# Patient Record
Sex: FEMALE | Race: WHITE | NOT HISPANIC OR LATINO | Employment: FULL TIME | URBAN - METROPOLITAN AREA
[De-identification: names, ages, dates, MRNs, and addresses within clinical notes are randomized per-mention and may not be internally consistent; named-entity substitution may affect disease eponyms.]

---

## 2017-01-05 ENCOUNTER — HOSPITAL ENCOUNTER (OUTPATIENT)
Dept: MAMMOGRAPHY | Facility: HOSPITAL | Age: 57
Discharge: HOME | End: 2017-01-05
Attending: OBSTETRICS & GYNECOLOGY | Admitting: OBSTETRICS & GYNECOLOGY

## 2017-04-13 ENCOUNTER — HOSPITAL ENCOUNTER (OUTPATIENT)
Dept: ORTHOPEDIC SURGERY | Facility: CLINIC | Age: 57
Discharge: HOME | End: 2017-04-13
Attending: ORTHOPAEDIC SURGERY | Admitting: ORTHOPAEDIC SURGERY

## 2018-02-08 ENCOUNTER — HOSPITAL ENCOUNTER (OUTPATIENT)
Dept: MAMMOGRAPHY | Facility: HOSPITAL | Age: 58
Discharge: HOME | End: 2018-02-08
Attending: OBSTETRICS & GYNECOLOGY | Admitting: OBSTETRICS & GYNECOLOGY

## 2019-01-11 ENCOUNTER — HOSPITAL ENCOUNTER (OUTPATIENT)
Dept: FAMILY MEDICINE CLINIC | Facility: CLINIC | Age: 59
Setting detail: SPECIMEN
Discharge: HOME | End: 2019-01-11
Attending: FAMILY MEDICINE | Admitting: FAMILY MEDICINE

## 2019-01-11 LAB
ALBUMIN SERPL-MCNC: 4.2 G/DL (ref 3.5–4.8)
ALBUMIN/GLOB SERPL: 1.8 {RATIO} (ref 1–1.7)
ALP SERPL-CCNC: 72 IU/L (ref 32–91)
ALT SERPL-CCNC: 31 IU/L (ref 14–54)
ANION GAP SERPL CALC-SCNC: 13.4 MMOL/L (ref 10–20)
AST SERPL-CCNC: 31 IU/L (ref 15–41)
BASOPHILS # BLD AUTO: 0 10*3/UL (ref 0–0.2)
BASOPHILS NFR BLD AUTO: 1 % (ref 0–2)
BILIRUB SERPL-MCNC: 0.6 MG/DL (ref 0.3–1.2)
BUN SERPL-MCNC: 17 MG/DL (ref 8–20)
BUN/CREAT SERPL: 18.9 (ref 5.4–26.2)
CALCIUM SERPL-MCNC: 9.6 MG/DL (ref 8.9–10.3)
CHLORIDE SERPL-SCNC: 101 MMOL/L (ref 101–111)
CONV CO2: 27 MMOL/L (ref 22–32)
CONV TOTAL PROTEIN: 6.5 G/DL (ref 6.1–7.9)
CREAT UR-MCNC: 0.9 MG/DL (ref 0.4–1)
DIFFERENTIAL METHOD BLD: (no result)
EOSINOPHIL # BLD AUTO: 0.1 10*3/UL (ref 0–0.3)
EOSINOPHIL # BLD AUTO: 2 % (ref 0–3)
ERYTHROCYTE [DISTWIDTH] IN BLOOD BY AUTOMATED COUNT: 13.2 % (ref 11.5–14.5)
GLOBULIN UR ELPH-MCNC: 2.3 G/DL (ref 2.5–3.8)
GLUCOSE SERPL-MCNC: 94 MG/DL (ref 65–99)
HCT VFR BLD AUTO: 39.8 % (ref 35–49)
HGB BLD-MCNC: 13.4 G/DL (ref 12–15)
LYMPHOCYTES # BLD AUTO: 1.7 10*3/UL (ref 0.8–4.8)
LYMPHOCYTES NFR BLD AUTO: 30 % (ref 18–42)
MCH RBC QN AUTO: 30.9 PG (ref 26–32)
MCHC RBC AUTO-ENTMCNC: 33.7 G/DL (ref 32–36)
MCV RBC AUTO: 91.9 FL (ref 80–94)
MONOCYTES # BLD AUTO: 0.3 10*3/UL (ref 0.1–1.3)
MONOCYTES NFR BLD AUTO: 6 % (ref 2–11)
NEUTROPHILS # BLD AUTO: 3.4 10*3/UL (ref 2.3–8.6)
NEUTROPHILS NFR BLD AUTO: 61 % (ref 50–75)
NRBC BLD AUTO-RTO: 0 /100{WBCS}
NRBC/RBC NFR BLD MANUAL: 0 10*3/UL
PLATELET # BLD AUTO: 184 10*3/UL (ref 150–450)
PMV BLD AUTO: 10.1 FL (ref 7.4–10.4)
POTASSIUM SERPL-SCNC: 4.4 MMOL/L (ref 3.6–5.1)
RBC # BLD AUTO: 4.33 10*6/UL (ref 4–5.4)
SODIUM SERPL-SCNC: 137 MMOL/L (ref 136–144)
WBC # BLD AUTO: 5.6 10*3/UL (ref 4.5–11.5)

## 2019-01-30 ENCOUNTER — HOSPITAL ENCOUNTER (OUTPATIENT)
Dept: GASTROENTEROLOGY | Facility: HOSPITAL | Age: 59
Setting detail: HOSPITAL OUTPATIENT SURGERY
Discharge: HOME | End: 2019-01-30
Attending: INTERNAL MEDICINE | Admitting: INTERNAL MEDICINE

## 2019-01-30 ENCOUNTER — ON CAMPUS - OUTPATIENT (AMBULATORY)
Dept: URBAN - METROPOLITAN AREA HOSPITAL 85 | Facility: HOSPITAL | Age: 59
End: 2019-01-30
Payer: COMMERCIAL

## 2019-01-30 DIAGNOSIS — Z12.11 ENCOUNTER FOR SCREENING FOR MALIGNANT NEOPLASM OF COLON: ICD-10-CM

## 2019-01-30 PROCEDURE — 45378 DIAGNOSTIC COLONOSCOPY: CPT | Performed by: INTERNAL MEDICINE

## 2019-03-13 ENCOUNTER — HOSPITAL ENCOUNTER (OUTPATIENT)
Dept: MAMMOGRAPHY | Facility: HOSPITAL | Age: 59
Discharge: HOME | End: 2019-03-13
Attending: OBSTETRICS & GYNECOLOGY | Admitting: OBSTETRICS & GYNECOLOGY

## 2020-01-14 RX ORDER — CALCIUM CARBONATE 500(1250)
1 TABLET ORAL DAILY
COMMUNITY
Start: 2019-01-11

## 2020-01-14 RX ORDER — MULTIVITAMIN
1 CAPSULE ORAL DAILY
COMMUNITY
Start: 2017-04-13 | End: 2020-01-28

## 2020-01-14 RX ORDER — CYANOCOBALAMIN (VITAMIN B-12) 1000 MCG
1 TABLET ORAL DAILY
COMMUNITY
Start: 2019-01-11

## 2020-01-14 RX ORDER — MULTIVITAMIN WITH IRON
1 TABLET ORAL DAILY
COMMUNITY
Start: 2017-04-13

## 2020-01-14 RX ORDER — ESOMEPRAZOLE MAGNESIUM 20 MG/1
1 TABLET, DELAYED RELEASE ORAL DAILY
COMMUNITY
Start: 2017-04-13 | End: 2021-11-02

## 2020-01-28 ENCOUNTER — OFFICE VISIT (OUTPATIENT)
Dept: FAMILY MEDICINE CLINIC | Facility: CLINIC | Age: 60
End: 2020-01-28

## 2020-01-28 VITALS
TEMPERATURE: 97.3 F | DIASTOLIC BLOOD PRESSURE: 87 MMHG | OXYGEN SATURATION: 100 % | HEIGHT: 66 IN | SYSTOLIC BLOOD PRESSURE: 128 MMHG | WEIGHT: 142 LBS | BODY MASS INDEX: 22.82 KG/M2 | HEART RATE: 66 BPM

## 2020-01-28 DIAGNOSIS — Z23 NEED FOR VACCINATION: ICD-10-CM

## 2020-01-28 DIAGNOSIS — Z00.00 ENCOUNTER FOR WELL ADULT EXAM WITHOUT ABNORMAL FINDINGS: Primary | ICD-10-CM

## 2020-01-28 PROCEDURE — 90732 PPSV23 VACC 2 YRS+ SUBQ/IM: CPT | Performed by: FAMILY MEDICINE

## 2020-01-28 PROCEDURE — 90471 IMMUNIZATION ADMIN: CPT | Performed by: FAMILY MEDICINE

## 2020-01-28 PROCEDURE — 99396 PREV VISIT EST AGE 40-64: CPT | Performed by: FAMILY MEDICINE

## 2020-01-28 NOTE — PROGRESS NOTES
Subjective   Angella Santos is a 59 y.o. female.     History of Present Illness     The patient present  today for a physical.  The patient doing well denies fatigue, cold intolerance, headache, cough,chest pain, palpitations, dizziness and SOB. The patient admits to dietary compliance is  fair  and exercising occasionally.  She is a non-smoker.  She has screening labs done in October. 2019 brought copy to review.  She is up to date with Pap smear, mammogram and colonoscopy.    The following portions of the patient's history were reviewed and updated as appropriate: past medical history, past social history, past surgical history and problem list.    Review of Systems   Constitutional: Negative for fatigue and fever.   HENT: Negative for ear pain, sinus pressure, sore throat and trouble swallowing.    Eyes: Negative for blurred vision.   Respiratory: Negative for cough, shortness of breath and wheezing.    Cardiovascular: Negative for chest pain, palpitations and leg swelling.   Gastrointestinal: Negative for abdominal pain, diarrhea, nausea and vomiting.   Endocrine: Negative for cold intolerance, heat intolerance, polydipsia, polyphagia and polyuria.   Genitourinary: Negative for dysuria and frequency.   Musculoskeletal: Positive for arthralgias. Negative for back pain and gait problem.   Neurological: Negative for dizziness, tremors, weakness and headache.   Psychiatric/Behavioral: Negative for depressed mood. The patient is not nervous/anxious.        Objective   Physical Exam   Constitutional: She is oriented to person, place, and time. She appears well-developed.   HENT:   Right Ear: External ear normal.   Left Ear: External ear normal.   Mouth/Throat: Oropharynx is clear and moist.   Eyes: Pupils are equal, round, and reactive to light. Conjunctivae and EOM are normal.   Neck: Normal range of motion. Neck supple.   Cardiovascular: Normal rate, regular rhythm, normal heart sounds and intact distal pulses.    Pulmonary/Chest: Effort normal and breath sounds normal. She has no wheezes.   Abdominal: Soft. Bowel sounds are normal. There is no tenderness.   Musculoskeletal: Normal range of motion.   Neurological: She is alert and oriented to person, place, and time.   Psychiatric: She has a normal mood and affect.   Vitals reviewed.        Assessment/Plan   Problems Addressed this Visit        Other    Encounter for well adult exam without abnormal findings - Primary     Discussed preventive care protocol and  importance of regular exercise and recommend starting or continuing a regular exercise program for good health.  Lab result reviewed with patient increase LDL recommend low-fat diet and fish oil 1 g daily.  Discussed immunization she already had a flu shot and is not due for tetanus booster.   Prevnar given today.         Need for vaccination    Relevant Medications    pneumococcal conj. 13-valent (PREVNAR-13) vaccine 0.5 mL (Completed)

## 2020-01-28 NOTE — ASSESSMENT & PLAN NOTE
Discussed preventive care protocol and  importance of regular exercise and recommend starting or continuing a regular exercise program for good health.  Lab result reviewed with patient increase LDL recommend low-fat diet and fish oil 1 g daily.  Discussed immunization she already had a flu shot and is not due for tetanus booster.   Prevnar given today.

## 2020-05-12 ENCOUNTER — TRANSCRIBE ORDERS (OUTPATIENT)
Dept: ADMINISTRATIVE | Facility: HOSPITAL | Age: 60
End: 2020-05-12

## 2020-05-12 DIAGNOSIS — Z12.31 VISIT FOR SCREENING MAMMOGRAM: Primary | ICD-10-CM

## 2020-05-12 DIAGNOSIS — Z13.820 SPECIAL SCREENING FOR OSTEOPOROSIS: ICD-10-CM

## 2020-05-21 ENCOUNTER — HOSPITAL ENCOUNTER (OUTPATIENT)
Dept: BONE DENSITY | Facility: HOSPITAL | Age: 60
Discharge: HOME | End: 2020-05-21

## 2020-05-21 ENCOUNTER — HOSPITAL ENCOUNTER (OUTPATIENT)
Dept: MAMMOGRAPHY | Facility: HOSPITAL | Age: 60
Discharge: HOME | End: 2020-05-21
Admitting: OBSTETRICS & GYNECOLOGY

## 2020-05-21 DIAGNOSIS — Z12.31 VISIT FOR SCREENING MAMMOGRAM: ICD-10-CM

## 2020-05-21 DIAGNOSIS — Z13.820 SPECIAL SCREENING FOR OSTEOPOROSIS: ICD-10-CM

## 2020-05-21 PROCEDURE — 77063 BREAST TOMOSYNTHESIS BI: CPT

## 2020-05-21 PROCEDURE — 77067 SCR MAMMO BI INCL CAD: CPT

## 2020-05-21 PROCEDURE — 77080 DXA BONE DENSITY AXIAL: CPT

## 2020-05-22 ENCOUNTER — NURSE TRIAGE (OUTPATIENT)
Dept: CALL CENTER | Facility: HOSPITAL | Age: 60
End: 2020-05-22

## 2020-05-22 NOTE — TELEPHONE ENCOUNTER
"Caller has a new dx and wanting to know what medication her insurance, after speaking with the patient the caller is going to calll the insurance company    Reason for Disposition  • Caller has NON-URGENT medication question about med that PCP prescribed and triager unable to answer question    Additional Information  • Negative: Drug overdose and nurse unable to answer question  • Negative: Caller requesting information not related to medicine  • Negative: Caller requesting a prescription for Strep throat and has a positive culture result  • Negative: Rash while taking a medication or within 3 days of stopping it  • Negative: Immunization reaction suspected  • Negative: [1] Asthma and [2] having symptoms of asthma (cough, wheezing, etc)  • Negative: MORE THAN A DOUBLE DOSE of a prescription or over-the-counter (OTC) drug  • Negative: [1] DOUBLE DOSE (an extra dose or lesser amount) of over-the-counter (OTC) drug AND [2] any symptoms (e.g., dizziness, nausea, pain, sleepiness)  • Negative: [1] DOUBLE DOSE (an extra dose or lesser amount) of prescription drug AND [2] any symptoms (e.g., dizziness, nausea, pain, sleepiness)  • Negative: Took another person's prescription drug  • Negative: [1] DOUBLE DOSE (an extra dose or lesser amount) of prescription drug AND [2] NO symptoms (Exception: a double dose of antibiotics)  • Negative: Diabetes drug error or overdose (e.g., insulin or extra dose)  • Negative: [1] Request for URGENT new prescription or refill of \"essential\" medication (i.e., likelihood of harm to patient if not taken) AND [2] triager unable to fill per unit policy  • Negative: [1] Prescription not at pharmacy AND [2] was prescribed today by PCP  • Negative: Pharmacy calling with prescription questions and triager unable to answer question  • Negative: Caller has URGENT medication question about med that PCP prescribed and triager unable to answer question    Answer Assessment - Initial Assessment " "Questions  1. SYMPTOMS: \"Do you have any symptoms?\"      Na   2. SEVERITY: If symptoms are present, ask \"Are they mild, moderate or severe?\"    Moderate to severe    Protocols used: MEDICATION QUESTION CALL-ADULT-      "

## 2020-10-30 ENCOUNTER — LAB (OUTPATIENT)
Dept: LAB | Facility: HOSPITAL | Age: 60
End: 2020-10-30

## 2020-10-30 ENCOUNTER — TRANSCRIBE ORDERS (OUTPATIENT)
Dept: ADMINISTRATIVE | Facility: HOSPITAL | Age: 60
End: 2020-10-30

## 2020-10-30 DIAGNOSIS — Z78.0 MENOPAUSE: ICD-10-CM

## 2020-10-30 DIAGNOSIS — Z78.0 MENOPAUSE: Primary | ICD-10-CM

## 2020-10-30 LAB
ALBUMIN SERPL-MCNC: 4.7 G/DL (ref 3.5–5.2)
ALBUMIN/GLOB SERPL: 1.8 G/DL
ALP SERPL-CCNC: 73 U/L (ref 39–117)
ALT SERPL W P-5'-P-CCNC: 23 U/L (ref 1–33)
ANION GAP SERPL CALCULATED.3IONS-SCNC: 7.5 MMOL/L (ref 5–15)
AST SERPL-CCNC: 25 U/L (ref 1–32)
BASOPHILS # BLD AUTO: 0.02 10*3/MM3 (ref 0–0.2)
BASOPHILS NFR BLD AUTO: 0.4 % (ref 0–1.5)
BILIRUB SERPL-MCNC: 0.3 MG/DL (ref 0–1.2)
BUN SERPL-MCNC: 15 MG/DL (ref 8–23)
BUN/CREAT SERPL: 18.1 (ref 7–25)
CALCIUM SPEC-SCNC: 9.7 MG/DL (ref 8.6–10.5)
CHLORIDE SERPL-SCNC: 102 MMOL/L (ref 98–107)
CHOLEST SERPL-MCNC: 197 MG/DL (ref 0–200)
CO2 SERPL-SCNC: 29.5 MMOL/L (ref 22–29)
CREAT SERPL-MCNC: 0.83 MG/DL (ref 0.57–1)
DEPRECATED RDW RBC AUTO: 43.2 FL (ref 37–54)
EOSINOPHIL # BLD AUTO: 0.07 10*3/MM3 (ref 0–0.4)
EOSINOPHIL NFR BLD AUTO: 1.4 % (ref 0.3–6.2)
ERYTHROCYTE [DISTWIDTH] IN BLOOD BY AUTOMATED COUNT: 12.7 % (ref 12.3–15.4)
GFR SERPL CREATININE-BSD FRML MDRD: 70 ML/MIN/1.73
GLOBULIN UR ELPH-MCNC: 2.6 GM/DL
GLUCOSE SERPL-MCNC: 93 MG/DL (ref 65–99)
HCT VFR BLD AUTO: 41.3 % (ref 34–46.6)
HDLC SERPL-MCNC: 60 MG/DL (ref 40–60)
HGB BLD-MCNC: 13.8 G/DL (ref 12–15.9)
IMM GRANULOCYTES # BLD AUTO: 0.01 10*3/MM3 (ref 0–0.05)
IMM GRANULOCYTES NFR BLD AUTO: 0.2 % (ref 0–0.5)
LDLC SERPL CALC-MCNC: 123 MG/DL (ref 0–100)
LDLC/HDLC SERPL: 2.02 {RATIO}
LYMPHOCYTES # BLD AUTO: 1.25 10*3/MM3 (ref 0.7–3.1)
LYMPHOCYTES NFR BLD AUTO: 25.3 % (ref 19.6–45.3)
MCH RBC QN AUTO: 30.8 PG (ref 26.6–33)
MCHC RBC AUTO-ENTMCNC: 33.4 G/DL (ref 31.5–35.7)
MCV RBC AUTO: 92.2 FL (ref 79–97)
MONOCYTES # BLD AUTO: 0.29 10*3/MM3 (ref 0.1–0.9)
MONOCYTES NFR BLD AUTO: 5.9 % (ref 5–12)
NEUTROPHILS NFR BLD AUTO: 3.3 10*3/MM3 (ref 1.7–7)
NEUTROPHILS NFR BLD AUTO: 66.8 % (ref 42.7–76)
NRBC BLD AUTO-RTO: 0 /100 WBC (ref 0–0.2)
PLATELET # BLD AUTO: 171 10*3/MM3 (ref 140–450)
PMV BLD AUTO: 11.5 FL (ref 6–12)
POTASSIUM SERPL-SCNC: 4.4 MMOL/L (ref 3.5–5.2)
PROT SERPL-MCNC: 7.3 G/DL (ref 6–8.5)
RBC # BLD AUTO: 4.48 10*6/MM3 (ref 3.77–5.28)
SODIUM SERPL-SCNC: 139 MMOL/L (ref 136–145)
TRIGL SERPL-MCNC: 80 MG/DL (ref 0–150)
TSH SERPL DL<=0.05 MIU/L-ACNC: 1.73 UIU/ML (ref 0.27–4.2)
VLDLC SERPL-MCNC: 14 MG/DL (ref 5–40)
WBC # BLD AUTO: 4.94 10*3/MM3 (ref 3.4–10.8)

## 2020-10-30 PROCEDURE — 84443 ASSAY THYROID STIM HORMONE: CPT

## 2020-10-30 PROCEDURE — 80053 COMPREHEN METABOLIC PANEL: CPT

## 2020-10-30 PROCEDURE — 82652 VIT D 1 25-DIHYDROXY: CPT

## 2020-10-30 PROCEDURE — 80061 LIPID PANEL: CPT

## 2020-10-30 PROCEDURE — 36415 COLL VENOUS BLD VENIPUNCTURE: CPT

## 2020-10-30 PROCEDURE — 85025 COMPLETE CBC W/AUTO DIFF WBC: CPT

## 2020-11-02 LAB — 1,25(OH)2D3 SERPL-MCNC: 51.3 PG/ML (ref 19.9–79.3)

## 2021-03-18 ENCOUNTER — OFFICE VISIT (OUTPATIENT)
Dept: FAMILY MEDICINE CLINIC | Facility: CLINIC | Age: 61
End: 2021-03-18

## 2021-03-18 ENCOUNTER — TELEPHONE (OUTPATIENT)
Dept: FAMILY MEDICINE CLINIC | Facility: CLINIC | Age: 61
End: 2021-03-18

## 2021-03-18 DIAGNOSIS — N30.00 ACUTE CYSTITIS WITHOUT HEMATURIA: Primary | ICD-10-CM

## 2021-03-18 PROCEDURE — 99213 OFFICE O/P EST LOW 20 MIN: CPT | Performed by: FAMILY MEDICINE

## 2021-03-18 RX ORDER — CIPROFLOXACIN 500 MG/1
500 TABLET, FILM COATED ORAL 2 TIMES DAILY
Qty: 14 TABLET | Refills: 0 | Status: SHIPPED | OUTPATIENT
Start: 2021-03-18 | End: 2021-03-25

## 2021-03-18 NOTE — PROGRESS NOTES
Subjective   Angella Santos is a 60 y.o. female.     You have chosen to receive care through a telehealth visit.  Do you consent to use a video/audio connection for your medical care today? Yes.    Telehealth visit in view of COVID-19 pandemic.Patient presents with urinary frequency and urgency. This is a new problem. The current episode started in the past 5 days.  Pertinent negatives include fever, no discharge, flank pain, hematuria, nausea or vomiting.              The following portions of the patient's history were reviewed and updated as appropriate: past medical history, past social history, past surgical history and problem list.    Review of Systems   Constitutional: Negative for fever.   Genitourinary: Positive for difficulty urinating, dysuria, frequency and urgency. Negative for flank pain, pelvic pain, vaginal bleeding and vaginal discharge.   Musculoskeletal: Negative for back pain.       Objective   Physical Exam  Neurological:      Mental Status: She is alert and oriented to person, place, and time.       Current Outpatient Medications on File Prior to Visit   Medication Sig Dispense Refill   • Calcium 500 MG tablet Take 1 tablet by mouth Daily.     • Cyanocobalamin (B-12) 1000 MCG tablet Take 1 tablet by mouth Daily.     • Esomeprazole Magnesium (nexIUM 24HR) 20 MG tablet delayed-release Take 1 tablet by mouth Daily.     • Magnesium 250 MG tablet Take 1 tablet by mouth Daily.     • [DISCONTINUED] sulfamethoxazole-trimethoprim (BACTRIM DS,SEPTRA DS) 800-160 MG per tablet Take 1 tablet by mouth 2 (Two) Times a Day. 14 tablet 0     No current facility-administered medications on file prior to visit.           Assessment/Plan   Problems Addressed this Visit        Genitourinary and Reproductive     Acute cystitis without hematuria - Primary     Advised symptom management,  plenty of fluids and Rx Cipro.  Make appointment if symptoms no better.    This is a video visit [employer] was used to complete this  visits. Total time of discussion was 15 minutes.           Diagnoses       Codes Comments    Acute cystitis without hematuria    -  Primary ICD-10-CM: N30.00  ICD-9-CM: 595.0

## 2021-03-18 NOTE — TELEPHONE ENCOUNTER
Caller: Angella Santos    Relationship: Self    Best call back number: [phone]    What medication are you requesting: SOMETHING TO HELP GET RID OF UTI    What are your current symptoms:  URGENCY, PRESSURE, STRONG SMELLING URINE, BURNING    How long have you been experiencing symptoms: 4 DAYS      If a prescription is needed, what is your preferred pharmacy and phone number:      [id number] - [place] [address] AT [address] OF [place] [address] & [facility] [address]  - [phone]  - [phone] FX        PATIENT WAS EXPOSED TO COVID 12 DAYS AGO, CAN NOT COME IN FOR APPOINTMENT CURRENTLY. PLEASE ADVISE IF WE CAN SEND IN MEDICATION TO HELP WITH UTI SYMPTOMS.

## 2021-03-18 NOTE — ASSESSMENT & PLAN NOTE
Advised symptom management,  plenty of fluids and Rx Cipro.  Make appointment if symptoms no better.    This is a video visit [employer] was used to complete this visits. Total time of discussion was 15 minutes.

## 2021-04-28 ENCOUNTER — TRANSCRIBE ORDERS (OUTPATIENT)
Dept: ADMINISTRATIVE | Facility: HOSPITAL | Age: 61
End: 2021-04-28

## 2021-04-28 DIAGNOSIS — Z12.39 SCREENING BREAST EXAMINATION: Primary | ICD-10-CM

## 2021-04-28 DIAGNOSIS — Z78.0 POSTMENOPAUSAL: ICD-10-CM

## 2021-05-27 ENCOUNTER — HOSPITAL ENCOUNTER (OUTPATIENT)
Dept: MAMMOGRAPHY | Facility: HOSPITAL | Age: 61
Discharge: HOME | End: 2021-05-27

## 2021-05-27 ENCOUNTER — HOSPITAL ENCOUNTER (OUTPATIENT)
Dept: BONE DENSITY | Facility: HOSPITAL | Age: 61
Discharge: HOME | End: 2021-05-27

## 2021-05-27 DIAGNOSIS — Z12.39 SCREENING BREAST EXAMINATION: ICD-10-CM

## 2021-05-27 DIAGNOSIS — Z78.0 POSTMENOPAUSAL: ICD-10-CM

## 2021-05-27 PROCEDURE — 77063 BREAST TOMOSYNTHESIS BI: CPT

## 2021-05-27 PROCEDURE — 77080 DXA BONE DENSITY AXIAL: CPT

## 2021-05-27 PROCEDURE — 77067 SCR MAMMO BI INCL CAD: CPT

## 2021-11-02 ENCOUNTER — OFFICE VISIT (OUTPATIENT)
Dept: FAMILY MEDICINE CLINIC | Facility: CLINIC | Age: 61
End: 2021-11-02

## 2021-11-02 ENCOUNTER — LAB (OUTPATIENT)
Dept: FAMILY MEDICINE CLINIC | Facility: CLINIC | Age: 61
End: 2021-11-02

## 2021-11-02 VITALS
SYSTOLIC BLOOD PRESSURE: 140 MMHG | HEIGHT: 66 IN | BODY MASS INDEX: 23.88 KG/M2 | OXYGEN SATURATION: 99 % | HEART RATE: 72 BPM | DIASTOLIC BLOOD PRESSURE: 87 MMHG | TEMPERATURE: 96.9 F | WEIGHT: 148.6 LBS

## 2021-11-02 DIAGNOSIS — Z11.59 ENCOUNTER FOR HEPATITIS C SCREENING TEST FOR LOW RISK PATIENT: ICD-10-CM

## 2021-11-02 DIAGNOSIS — Z13.6 ENCOUNTER FOR LIPID SCREENING FOR CARDIOVASCULAR DISEASE: ICD-10-CM

## 2021-11-02 DIAGNOSIS — Z13.220 ENCOUNTER FOR LIPID SCREENING FOR CARDIOVASCULAR DISEASE: ICD-10-CM

## 2021-11-02 DIAGNOSIS — Z00.00 ENCOUNTER FOR WELL ADULT EXAM WITHOUT ABNORMAL FINDINGS: ICD-10-CM

## 2021-11-02 DIAGNOSIS — Z00.00 ENCOUNTER FOR WELL ADULT EXAM WITHOUT ABNORMAL FINDINGS: Primary | ICD-10-CM

## 2021-11-02 LAB
ALBUMIN SERPL-MCNC: 4.8 G/DL (ref 3.5–5.2)
ALBUMIN/GLOB SERPL: 1.8 G/DL
ALP SERPL-CCNC: 87 U/L (ref 39–117)
ALT SERPL W P-5'-P-CCNC: 18 U/L (ref 1–33)
ANION GAP SERPL CALCULATED.3IONS-SCNC: 8.3 MMOL/L (ref 5–15)
AST SERPL-CCNC: 25 U/L (ref 1–32)
BASOPHILS # BLD AUTO: 0.03 10*3/MM3 (ref 0–0.2)
BASOPHILS NFR BLD AUTO: 0.6 % (ref 0–1.5)
BILIRUB SERPL-MCNC: 0.3 MG/DL (ref 0–1.2)
BUN SERPL-MCNC: 13 MG/DL (ref 8–23)
BUN/CREAT SERPL: 13.3 (ref 7–25)
CALCIUM SPEC-SCNC: 10.1 MG/DL (ref 8.6–10.5)
CHLORIDE SERPL-SCNC: 104 MMOL/L (ref 98–107)
CHOLEST SERPL-MCNC: 206 MG/DL (ref 0–200)
CO2 SERPL-SCNC: 28.7 MMOL/L (ref 22–29)
CREAT SERPL-MCNC: 0.98 MG/DL (ref 0.57–1)
DEPRECATED RDW RBC AUTO: 41.4 FL (ref 37–54)
EOSINOPHIL # BLD AUTO: 0.08 10*3/MM3 (ref 0–0.4)
EOSINOPHIL NFR BLD AUTO: 1.6 % (ref 0.3–6.2)
ERYTHROCYTE [DISTWIDTH] IN BLOOD BY AUTOMATED COUNT: 12.6 % (ref 12.3–15.4)
GFR SERPL CREATININE-BSD FRML MDRD: 58 ML/MIN/1.73
GLOBULIN UR ELPH-MCNC: 2.7 GM/DL
GLUCOSE SERPL-MCNC: 98 MG/DL (ref 65–99)
HCT VFR BLD AUTO: 39.9 % (ref 34–46.6)
HDLC SERPL-MCNC: 59 MG/DL (ref 40–60)
HGB BLD-MCNC: 13.6 G/DL (ref 12–15.9)
IMM GRANULOCYTES # BLD AUTO: 0.01 10*3/MM3 (ref 0–0.05)
IMM GRANULOCYTES NFR BLD AUTO: 0.2 % (ref 0–0.5)
LDLC SERPL CALC-MCNC: 132 MG/DL (ref 0–100)
LDLC/HDLC SERPL: 2.21 {RATIO}
LYMPHOCYTES # BLD AUTO: 1.31 10*3/MM3 (ref 0.7–3.1)
LYMPHOCYTES NFR BLD AUTO: 26.4 % (ref 19.6–45.3)
MCH RBC QN AUTO: 30.8 PG (ref 26.6–33)
MCHC RBC AUTO-ENTMCNC: 34.1 G/DL (ref 31.5–35.7)
MCV RBC AUTO: 90.5 FL (ref 79–97)
MONOCYTES # BLD AUTO: 0.38 10*3/MM3 (ref 0.1–0.9)
MONOCYTES NFR BLD AUTO: 7.6 % (ref 5–12)
NEUTROPHILS NFR BLD AUTO: 3.16 10*3/MM3 (ref 1.7–7)
NEUTROPHILS NFR BLD AUTO: 63.6 % (ref 42.7–76)
NRBC BLD AUTO-RTO: 0 /100 WBC (ref 0–0.2)
PLATELET # BLD AUTO: 172 10*3/MM3 (ref 140–450)
PMV BLD AUTO: 12.1 FL (ref 6–12)
POTASSIUM SERPL-SCNC: 4.5 MMOL/L (ref 3.5–5.2)
PROT SERPL-MCNC: 7.5 G/DL (ref 6–8.5)
RBC # BLD AUTO: 4.41 10*6/MM3 (ref 3.77–5.28)
SODIUM SERPL-SCNC: 141 MMOL/L (ref 136–145)
TRIGL SERPL-MCNC: 84 MG/DL (ref 0–150)
TSH SERPL DL<=0.05 MIU/L-ACNC: 1.53 UIU/ML (ref 0.27–4.2)
VLDLC SERPL-MCNC: 15 MG/DL (ref 5–40)
WBC # BLD AUTO: 4.97 10*3/MM3 (ref 3.4–10.8)

## 2021-11-02 PROCEDURE — 84443 ASSAY THYROID STIM HORMONE: CPT | Performed by: FAMILY MEDICINE

## 2021-11-02 PROCEDURE — 99396 PREV VISIT EST AGE 40-64: CPT | Performed by: FAMILY MEDICINE

## 2021-11-02 PROCEDURE — 85025 COMPLETE CBC W/AUTO DIFF WBC: CPT | Performed by: FAMILY MEDICINE

## 2021-11-02 PROCEDURE — 80053 COMPREHEN METABOLIC PANEL: CPT | Performed by: FAMILY MEDICINE

## 2021-11-02 PROCEDURE — 36415 COLL VENOUS BLD VENIPUNCTURE: CPT

## 2021-11-02 PROCEDURE — 86803 HEPATITIS C AB TEST: CPT | Performed by: FAMILY MEDICINE

## 2021-11-02 PROCEDURE — 80061 LIPID PANEL: CPT | Performed by: FAMILY MEDICINE

## 2021-11-02 RX ORDER — ESOMEPRAZOLE MAGNESIUM 20 MG/1
FOR SUSPENSION ORAL
COMMUNITY
End: 2021-11-02

## 2021-11-02 RX ORDER — MULTIPLE VITAMINS W/ MINERALS TAB 9MG-400MCG
TAB ORAL
COMMUNITY
Start: 2020-11-30

## 2021-11-02 RX ORDER — PANTOPRAZOLE SODIUM 20 MG/1
20 TABLET, DELAYED RELEASE ORAL DAILY
Qty: 90 TABLET | Refills: 3 | Status: SHIPPED | OUTPATIENT
Start: 2021-11-02 | End: 2023-01-05 | Stop reason: SDUPTHER

## 2021-11-02 RX ORDER — CLOBETASOL PROPIONATE 0.5 MG/G
1 CREAM TOPICAL 2 TIMES DAILY
Qty: 30 G | Refills: 0 | Status: SHIPPED | OUTPATIENT
Start: 2021-11-02 | End: 2023-01-05

## 2021-11-02 RX ORDER — CHLORAL HYDRATE 500 MG
CAPSULE ORAL
COMMUNITY

## 2021-11-02 NOTE — PROGRESS NOTES
Subjective   Angella Santos is a 61 y.o. female.     History of Present Illness     The patient comes in today for annual physical. The patient is doing well denies anxiety, fatigue, cold intolerance, headache, cough, chest pain, palpitations, dizziness, abdominal pain, nausea and vomiting and SOB. The patient admits to dietary compliance is  fair  and exercising occasionally.  She is a non-smoker.   She also has Hx of GERD wants to switch from Nexium to Protonix due to being more effective.      The following portions of the patient's history were reviewed and updated as appropriate: past medical history, past social history, past surgical history and problem list.    Review of Systems   Constitutional: Negative for activity change, appetite change, fatigue and fever.   HENT: Negative for trouble swallowing.    Respiratory: Negative for cough, shortness of breath and wheezing.    Cardiovascular: Negative for chest pain and palpitations.   Gastrointestinal: Negative for abdominal pain, nausea, vomiting and indigestion.   Endocrine: Negative for cold intolerance.   Genitourinary: Negative for dysuria.   Musculoskeletal: Negative for back pain.   Neurological: Negative for headache.   Psychiatric/Behavioral: Negative for sleep disturbance and depressed mood. The patient is not nervous/anxious.        Objective   Physical Exam  Vitals reviewed.   Constitutional:       General: She is not in acute distress.     Appearance: She is well-developed.   Neck:      Thyroid: No thyromegaly.   Cardiovascular:      Rate and Rhythm: Normal rate.      Heart sounds: Normal heart sounds.   Pulmonary:      Effort: Pulmonary effort is normal.      Breath sounds: Normal breath sounds. No wheezing.   Abdominal:      General: Bowel sounds are normal.      Palpations: Abdomen is soft.      Tenderness: There is no abdominal tenderness.   Musculoskeletal:         General: Normal range of motion.      Cervical back: Normal range of motion and  neck supple.   Neurological:      Mental Status: She is alert and oriented to person, place, and time.   Psychiatric:         Mood and Affect: Mood normal.       Vitals:    11/02/21 0924   BP: 140/87   Pulse: 72   Temp: 96.9 °F (36.1 °C)   SpO2: 99%     Current Outpatient Medications on File Prior to Visit   Medication Sig Dispense Refill   • Calcium 500 MG tablet Take 1 tablet by mouth Daily.     • Cyanocobalamin (B-12) 1000 MCG tablet Take 1 tablet by mouth Daily.     • Magnesium 250 MG tablet Take 1 tablet by mouth Daily.     • multivitamin with minerals (MULTIVITAL-M PO)      • Omega-3 Fatty Acids (fish oil) 1000 MG capsule capsule      • [DISCONTINUED] esomeprazole (nexIUM) 20 MG packet      • [DISCONTINUED] Esomeprazole Magnesium (nexIUM 24HR) 20 MG tablet delayed-release Take 1 tablet by mouth Daily.     • [DISCONTINUED] estradiol (ESTRACE) 0.1 MG/GM vaginal cream Insert 1 gram of cream into vagina nightly at bedtime for 14 days. Then switch to twice weekly. 42.5 g 12     No current facility-administered medications on file prior to visit.           Assessment/Plan   Problems Addressed this Visit        Cardiac and Vasculature    Encounter for lipid screening for cardiovascular disease    Relevant Orders    Lipid panel (Completed)       Health Encounters    Encounter for well adult exam without abnormal findings - Primary     Discussed healthy diet and  importance of regular exercise and recommend starting or continuing a regular exercise program for good health.  Stressed importance of moderation in sodium/caffeine intake, saturated fat and cholesterol, caloric balance, sufficient intake of fresh fruits, vegetables, fiber, calcium and iron.           Relevant Orders    Lipid panel (Completed)    Comprehensive metabolic panel (Completed)    TSH (Completed)    CBC w AUTO Differential (Completed)       Infectious Diseases    Encounter for hepatitis C screening test for low risk patient    Relevant Orders     Hepatitis C antibody      Diagnoses       Codes Comments    Encounter for well adult exam without abnormal findings    -  Primary ICD-10-CM: Z00.00  ICD-9-CM: V70.0     Encounter for hepatitis C screening test for low risk patient     ICD-10-CM: Z11.59  ICD-9-CM: V73.89     Encounter for lipid screening for cardiovascular disease     ICD-10-CM: Z13.220, Z13.6  ICD-9-CM: V77.91, V81.2

## 2021-11-03 LAB — HCV AB SER DONR QL: NORMAL

## 2022-01-06 ENCOUNTER — TRANSCRIBE ORDERS (OUTPATIENT)
Dept: ADMINISTRATIVE | Facility: HOSPITAL | Age: 62
End: 2022-01-06

## 2022-01-06 DIAGNOSIS — Z13.6 ENCOUNTER FOR SCREENING FOR VASCULAR DISEASE: Primary | ICD-10-CM

## 2022-02-08 ENCOUNTER — HOSPITAL ENCOUNTER (OUTPATIENT)
Dept: CARDIOLOGY | Facility: HOSPITAL | Age: 62
Discharge: HOME | End: 2022-02-08

## 2022-02-08 ENCOUNTER — HOSPITAL ENCOUNTER (OUTPATIENT)
Dept: CT IMAGING | Facility: HOSPITAL | Age: 62
Discharge: HOME | End: 2022-02-08

## 2022-02-08 DIAGNOSIS — Z13.6 ENCOUNTER FOR SCREENING FOR VASCULAR DISEASE: ICD-10-CM

## 2022-02-08 LAB
BH CV XLRA MEAS - MID AO DIAM: 1.9 CM
BH CV XLRA MEAS - PAD LEFT ABI PT: 1.05
BH CV XLRA MEAS - PAD LEFT ARM: 127 MMHG
BH CV XLRA MEAS - PAD LEFT LEG PT: 137 MMHG
BH CV XLRA MEAS - PAD RIGHT ABI PT: 0.96
BH CV XLRA MEAS - PAD RIGHT ARM: 130 MMHG
BH CV XLRA MEAS - PAD RIGHT LEG PT: 125 MMHG
BH CV XLRA MEAS LEFT DIST CCA EDV: 21.1 CM/SEC
BH CV XLRA MEAS LEFT DIST CCA PSV: 63.2 CM/SEC
BH CV XLRA MEAS LEFT ICA/CCA RATIO: 1.1
BH CV XLRA MEAS LEFT MID CCA PSV: 63 CM/SEC
BH CV XLRA MEAS LEFT MID ICA PSV: 70 CM/SEC
BH CV XLRA MEAS LEFT PROX ICA EDV: -26.3 CM/SEC
BH CV XLRA MEAS LEFT PROX ICA PSV: -69.8 CM/SEC
BH CV XLRA MEAS RIGHT DIST CCA EDV: 31.2 CM/SEC
BH CV XLRA MEAS RIGHT DIST CCA PSV: 65 CM/SEC
BH CV XLRA MEAS RIGHT ICA/CCA RATIO: 1.1
BH CV XLRA MEAS RIGHT MID CCA PSV: 65 CM/SEC
BH CV XLRA MEAS RIGHT MID ICA PSV: 70 CM/SEC
BH CV XLRA MEAS RIGHT PROX ICA EDV: -27.7 CM/SEC
BH CV XLRA MEAS RIGHT PROX ICA PSV: -70.2 CM/SEC
MAXIMAL PREDICTED HEART RATE: 159 BPM
STRESS TARGET HR: 135 BPM

## 2022-02-08 PROCEDURE — 75571 CT HRT W/O DYE W/CA TEST: CPT

## 2022-02-08 PROCEDURE — 93799 UNLISTED CV SVC/PROCEDURE: CPT

## 2022-05-27 ENCOUNTER — TRANSCRIBE ORDERS (OUTPATIENT)
Dept: ADMINISTRATIVE | Facility: HOSPITAL | Age: 62
End: 2022-05-27

## 2022-05-27 DIAGNOSIS — Z12.31 VISIT FOR SCREENING MAMMOGRAM: Primary | ICD-10-CM

## 2022-06-14 ENCOUNTER — HOSPITAL ENCOUNTER (OUTPATIENT)
Dept: MAMMOGRAPHY | Facility: HOSPITAL | Age: 62
Discharge: HOME | End: 2022-06-14
Admitting: OBSTETRICS & GYNECOLOGY

## 2022-06-14 DIAGNOSIS — Z12.31 VISIT FOR SCREENING MAMMOGRAM: ICD-10-CM

## 2022-06-14 PROCEDURE — 77067 SCR MAMMO BI INCL CAD: CPT

## 2022-06-14 PROCEDURE — 77063 BREAST TOMOSYNTHESIS BI: CPT

## 2023-01-05 ENCOUNTER — OFFICE VISIT (OUTPATIENT)
Dept: FAMILY MEDICINE CLINIC | Facility: CLINIC | Age: 63
End: 2023-01-05
Payer: COMMERCIAL

## 2023-01-05 ENCOUNTER — LAB (OUTPATIENT)
Dept: FAMILY MEDICINE CLINIC | Facility: CLINIC | Age: 63
End: 2023-01-05
Payer: COMMERCIAL

## 2023-01-05 VITALS
HEART RATE: 63 BPM | DIASTOLIC BLOOD PRESSURE: 92 MMHG | TEMPERATURE: 96.4 F | OXYGEN SATURATION: 100 % | HEIGHT: 66 IN | WEIGHT: 142 LBS | SYSTOLIC BLOOD PRESSURE: 148 MMHG | RESPIRATION RATE: 16 BRPM | BODY MASS INDEX: 22.82 KG/M2

## 2023-01-05 DIAGNOSIS — Z00.00 ENCOUNTER FOR WELL ADULT EXAM WITHOUT ABNORMAL FINDINGS: Primary | ICD-10-CM

## 2023-01-05 LAB
ALBUMIN SERPL-MCNC: 4.6 G/DL (ref 3.5–5.2)
ALBUMIN/GLOB SERPL: 1.8 G/DL
ALP SERPL-CCNC: 79 U/L (ref 39–117)
ALT SERPL W P-5'-P-CCNC: 19 U/L (ref 1–33)
ANION GAP SERPL CALCULATED.3IONS-SCNC: 8.6 MMOL/L (ref 5–15)
AST SERPL-CCNC: 22 U/L (ref 1–32)
BASOPHILS # BLD AUTO: 0.03 10*3/MM3 (ref 0–0.2)
BASOPHILS NFR BLD AUTO: 0.5 % (ref 0–1.5)
BILIRUB SERPL-MCNC: 0.4 MG/DL (ref 0–1.2)
BUN SERPL-MCNC: 14 MG/DL (ref 8–23)
BUN/CREAT SERPL: 15.9 (ref 7–25)
CALCIUM SPEC-SCNC: 10 MG/DL (ref 8.6–10.5)
CHLORIDE SERPL-SCNC: 107 MMOL/L (ref 98–107)
CHOLEST SERPL-MCNC: 194 MG/DL (ref 0–200)
CO2 SERPL-SCNC: 28.4 MMOL/L (ref 22–29)
CREAT SERPL-MCNC: 0.88 MG/DL (ref 0.57–1)
DEPRECATED RDW RBC AUTO: 42.5 FL (ref 37–54)
EGFRCR SERPLBLD CKD-EPI 2021: 74.4 ML/MIN/1.73
EOSINOPHIL # BLD AUTO: 0.09 10*3/MM3 (ref 0–0.4)
EOSINOPHIL NFR BLD AUTO: 1.5 % (ref 0.3–6.2)
ERYTHROCYTE [DISTWIDTH] IN BLOOD BY AUTOMATED COUNT: 12.6 % (ref 12.3–15.4)
GLOBULIN UR ELPH-MCNC: 2.5 GM/DL
GLUCOSE SERPL-MCNC: 89 MG/DL (ref 65–99)
HCT VFR BLD AUTO: 38.7 % (ref 34–46.6)
HDLC SERPL-MCNC: 50 MG/DL (ref 40–60)
HGB BLD-MCNC: 13.2 G/DL (ref 12–15.9)
IMM GRANULOCYTES # BLD AUTO: 0.02 10*3/MM3 (ref 0–0.05)
IMM GRANULOCYTES NFR BLD AUTO: 0.3 % (ref 0–0.5)
LDLC SERPL CALC-MCNC: 124 MG/DL (ref 0–100)
LDLC/HDLC SERPL: 2.44 {RATIO}
LYMPHOCYTES # BLD AUTO: 1.5 10*3/MM3 (ref 0.7–3.1)
LYMPHOCYTES NFR BLD AUTO: 25.5 % (ref 19.6–45.3)
MCH RBC QN AUTO: 31.4 PG (ref 26.6–33)
MCHC RBC AUTO-ENTMCNC: 34.1 G/DL (ref 31.5–35.7)
MCV RBC AUTO: 92.1 FL (ref 79–97)
MONOCYTES # BLD AUTO: 0.43 10*3/MM3 (ref 0.1–0.9)
MONOCYTES NFR BLD AUTO: 7.3 % (ref 5–12)
NEUTROPHILS NFR BLD AUTO: 3.81 10*3/MM3 (ref 1.7–7)
NEUTROPHILS NFR BLD AUTO: 64.9 % (ref 42.7–76)
NRBC BLD AUTO-RTO: 0 /100 WBC (ref 0–0.2)
PLATELET # BLD AUTO: 162 10*3/MM3 (ref 140–450)
PMV BLD AUTO: 12.1 FL (ref 6–12)
POTASSIUM SERPL-SCNC: 4.3 MMOL/L (ref 3.5–5.2)
PROT SERPL-MCNC: 7.1 G/DL (ref 6–8.5)
RBC # BLD AUTO: 4.2 10*6/MM3 (ref 3.77–5.28)
SODIUM SERPL-SCNC: 144 MMOL/L (ref 136–145)
TRIGL SERPL-MCNC: 110 MG/DL (ref 0–150)
TSH SERPL DL<=0.05 MIU/L-ACNC: 0.97 UIU/ML (ref 0.27–4.2)
VLDLC SERPL-MCNC: 20 MG/DL (ref 5–40)
WBC NRBC COR # BLD: 5.88 10*3/MM3 (ref 3.4–10.8)

## 2023-01-05 PROCEDURE — 80050 GENERAL HEALTH PANEL: CPT | Performed by: FAMILY MEDICINE

## 2023-01-05 PROCEDURE — 80061 LIPID PANEL: CPT | Performed by: FAMILY MEDICINE

## 2023-01-05 PROCEDURE — 99396 PREV VISIT EST AGE 40-64: CPT | Performed by: FAMILY MEDICINE

## 2023-01-05 PROCEDURE — 36415 COLL VENOUS BLD VENIPUNCTURE: CPT | Performed by: FAMILY MEDICINE

## 2023-01-05 RX ORDER — PSYLLIUM SEED (WITH DEXTROSE)
POWDER (GRAM) ORAL
COMMUNITY
Start: 2022-07-30

## 2023-01-05 RX ORDER — PANTOPRAZOLE SODIUM 20 MG/1
20 TABLET, DELAYED RELEASE ORAL DAILY
Qty: 90 TABLET | Refills: 3 | Status: SHIPPED | OUTPATIENT
Start: 2023-01-05

## 2023-01-05 NOTE — ASSESSMENT & PLAN NOTE
Discussed healthy diet and  importance of regular.  Stressed importance of moderation in sodium/caffeine intake, saturated fat and cholesterol, caloric balance, sufficient intake of fresh fruits, vegetables, fiber, calcium and iron.  Patient is up-to-date with mammogram, Pap smear and colonoscopy.  We will check fasting lipid panel, CBC, TSH and CMP.

## 2023-01-05 NOTE — PROGRESS NOTES
Subjective   Angella Santos is a 62 y.o. female.     History of Present Illness     The patient comes in today for annual physical.  The patient is doing well denies fatigue, anxiety, depression, abdominal, headache, cough,chest pain, palpitations, dizziness and SOB.  The blood pressure is elevated today patient is asymptomatic.  The patient admits to dietary compliance is  fair  and exercising occasionally.  She is a non-smoker.      The following portions of the patient's history were reviewed and updated as appropriate: past medical history, past social history, past surgical history and problem list.    Review of Systems   Constitutional: Negative for activity change, appetite change, fatigue and fever.   HENT: Negative for sore throat and trouble swallowing.    Eyes: Negative for blurred vision.   Respiratory: Negative for cough, shortness of breath and wheezing.    Cardiovascular: Negative for chest pain and palpitations.   Gastrointestinal: Positive for indigestion. Negative for abdominal pain, nausea and vomiting.   Endocrine: Negative for cold intolerance.   Neurological: Negative for dizziness and headache.   Psychiatric/Behavioral: Negative for sleep disturbance and depressed mood. The patient is not nervous/anxious.        Objective   Physical Exam  Vitals reviewed.   Constitutional:       General: She is not in acute distress.     Appearance: She is well-developed.   Neck:      Thyroid: No thyromegaly.   Pulmonary:      Breath sounds: Normal breath sounds. No wheezing.   Abdominal:      Tenderness: There is no abdominal tenderness.   Musculoskeletal:         General: Normal range of motion.      Cervical back: Normal range of motion and neck supple. No tenderness.   Neurological:      Mental Status: She is alert and oriented to person, place, and time.   Psychiatric:         Mood and Affect: Mood normal.       Vitals:    01/05/23 1257   BP: 148/92   Pulse: 63   Resp: 16   Temp: 96.4 °F (35.8 °C)   SpO2:  100%   Body mass index is 22.92 kg/m².    Current Outpatient Medications on File Prior to Visit   Medication Sig Dispense Refill   • Calcium 500 MG tablet Take 1 tablet by mouth Daily.     • Cyanocobalamin (B-12) 1000 MCG tablet Take 1 tablet by mouth Daily.     • Magnesium 250 MG tablet Take 1 tablet by mouth Daily.     • multivitamin with minerals tablet tablet      • Omega-3 Fatty Acids (fish oil) 1000 MG capsule capsule      • Psyllium (Metamucil) 28.3 % powder      • [DISCONTINUED] pantoprazole (Protonix) 20 MG EC tablet Take 1 tablet by mouth Daily. 90 tablet 3   • [DISCONTINUED] clobetasol (TEMOVATE) 0.05 % cream Apply 1 application topically to the appropriate area as directed 2 (Two) Times a Day. 30 g 0     No current facility-administered medications on file prior to visit.           Assessment & Plan   Problems Addressed this Visit        Health Encounters    Encounter for well adult exam without abnormal findings - Primary     Discussed healthy diet and  importance of regular.  Stressed importance of moderation in sodium/caffeine intake, saturated fat and cholesterol, caloric balance, sufficient intake of fresh fruits, vegetables, fiber, calcium and iron.  Patient is up-to-date with mammogram, Pap smear and colonoscopy.  We will check fasting lipid panel, CBC, TSH and CMP.         Relevant Orders    Comprehensive metabolic panel    TSH    Lipid panel    CBC w AUTO Differential   Diagnoses       Codes Comments    Encounter for well adult exam without abnormal findings    -  Primary ICD-10-CM: Z00.00  ICD-9-CM: V70.0

## 2023-01-06 NOTE — PROGRESS NOTES
Patient notified via voicemail. Advised to call back if she has any questions or concerns on 1/6 @ 5:30PM.

## 2023-01-30 ENCOUNTER — TELEPHONE (OUTPATIENT)
Dept: FAMILY MEDICINE CLINIC | Facility: CLINIC | Age: 63
End: 2023-01-30

## 2023-01-30 NOTE — TELEPHONE ENCOUNTER
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 PATIENT HAS HEARD REALLY GOOD THINGS ABOUT NICHOLE ROBERTS.    Additional notes:

## 2023-08-01 ENCOUNTER — TRANSCRIBE ORDERS (OUTPATIENT)
Dept: ADMINISTRATIVE | Facility: HOSPITAL | Age: 63
End: 2023-08-01
Payer: COMMERCIAL

## 2023-08-01 DIAGNOSIS — Z12.31 VISIT FOR SCREENING MAMMOGRAM: ICD-10-CM

## 2023-08-01 DIAGNOSIS — M85.80 OSTEOPENIA, SENILE: Primary | ICD-10-CM

## 2023-08-11 ENCOUNTER — HOSPITAL ENCOUNTER (OUTPATIENT)
Dept: BONE DENSITY | Facility: HOSPITAL | Age: 63
Discharge: HOME | End: 2023-08-11
Payer: COMMERCIAL

## 2023-08-11 ENCOUNTER — HOSPITAL ENCOUNTER (OUTPATIENT)
Dept: MAMMOGRAPHY | Facility: HOSPITAL | Age: 63
Discharge: HOME | End: 2023-08-11
Payer: COMMERCIAL

## 2023-08-11 DIAGNOSIS — M85.80 OSTEOPENIA, SENILE: ICD-10-CM

## 2023-08-11 DIAGNOSIS — Z12.31 VISIT FOR SCREENING MAMMOGRAM: ICD-10-CM

## 2023-08-11 PROCEDURE — 77063 BREAST TOMOSYNTHESIS BI: CPT

## 2023-08-11 PROCEDURE — 77067 SCR MAMMO BI INCL CAD: CPT

## 2023-08-11 PROCEDURE — 77080 DXA BONE DENSITY AXIAL: CPT

## 2023-09-22 ENCOUNTER — TRANSCRIBE ORDERS (OUTPATIENT)
Dept: LAB | Facility: HOSPITAL | Age: 63
End: 2023-09-22
Payer: COMMERCIAL

## 2023-09-22 ENCOUNTER — LAB (OUTPATIENT)
Dept: LAB | Facility: HOSPITAL | Age: 63
End: 2023-09-22
Payer: COMMERCIAL

## 2023-09-22 DIAGNOSIS — M85.80 OSTEOPENIA, SENILE: Primary | ICD-10-CM

## 2023-09-22 DIAGNOSIS — M85.80 OSTEOPENIA, SENILE: ICD-10-CM

## 2023-09-22 LAB
CALCIUM SPEC-SCNC: 9.3 MG/DL (ref 8.6–10.5)
CREAT SERPL-MCNC: 0.94 MG/DL (ref 0.57–1)
EGFRCR SERPLBLD CKD-EPI 2021: 68.3 ML/MIN/1.73

## 2023-09-22 PROCEDURE — 82310 ASSAY OF CALCIUM: CPT

## 2023-09-22 PROCEDURE — 36415 COLL VENOUS BLD VENIPUNCTURE: CPT

## 2023-09-22 PROCEDURE — 82565 ASSAY OF CREATININE: CPT

## 2023-09-26 ENCOUNTER — TRANSCRIBE ORDERS (OUTPATIENT)
Dept: ADMINISTRATIVE | Facility: HOSPITAL | Age: 63
End: 2023-09-26
Payer: COMMERCIAL

## 2023-09-26 DIAGNOSIS — M81.0 SENILE OSTEOPOROSIS: Primary | ICD-10-CM

## 2023-11-01 ENCOUNTER — TRANSCRIBE ORDERS (OUTPATIENT)
Dept: ADMINISTRATIVE | Facility: HOSPITAL | Age: 63
End: 2023-11-01
Payer: COMMERCIAL

## 2023-11-01 ENCOUNTER — LAB (OUTPATIENT)
Dept: LAB | Facility: HOSPITAL | Age: 63
End: 2023-11-01
Payer: COMMERCIAL

## 2023-11-01 DIAGNOSIS — M85.80 OSTEOPENIA, UNSPECIFIED LOCATION: ICD-10-CM

## 2023-11-01 DIAGNOSIS — M85.80 OSTEOPENIA, UNSPECIFIED LOCATION: Primary | ICD-10-CM

## 2023-11-01 LAB
CALCIUM SPEC-SCNC: 9.8 MG/DL (ref 8.6–10.5)
CREAT SERPL-MCNC: 0.9 MG/DL (ref 0.57–1)
EGFRCR SERPLBLD CKD-EPI 2021: 72 ML/MIN/1.73

## 2023-11-01 PROCEDURE — 82565 ASSAY OF CREATININE: CPT

## 2023-11-01 PROCEDURE — 82310 ASSAY OF CALCIUM: CPT

## 2023-11-01 PROCEDURE — 36415 COLL VENOUS BLD VENIPUNCTURE: CPT

## 2023-11-02 PROBLEM — M81.0 OSTEOPOROSIS: Status: ACTIVE | Noted: 2023-11-02

## 2023-11-02 RX ORDER — ZOLEDRONIC ACID 5 MG/100ML
5 INJECTION, SOLUTION INTRAVENOUS ONCE
OUTPATIENT
Start: 2023-11-08

## 2023-11-02 RX ORDER — SODIUM CHLORIDE 9 MG/ML
250 INJECTION, SOLUTION INTRAVENOUS ONCE
OUTPATIENT
Start: 2023-11-08

## 2023-11-08 ENCOUNTER — HOSPITAL ENCOUNTER (OUTPATIENT)
Dept: ONCOLOGY | Facility: HOSPITAL | Age: 63
Discharge: HOME | End: 2023-11-08
Admitting: OBSTETRICS & GYNECOLOGY
Payer: COMMERCIAL

## 2023-11-08 VITALS
SYSTOLIC BLOOD PRESSURE: 129 MMHG | WEIGHT: 148.5 LBS | HEIGHT: 66 IN | RESPIRATION RATE: 16 BRPM | BODY MASS INDEX: 23.87 KG/M2 | OXYGEN SATURATION: 97 % | HEART RATE: 71 BPM | DIASTOLIC BLOOD PRESSURE: 78 MMHG | TEMPERATURE: 97.7 F

## 2023-11-08 DIAGNOSIS — M81.8 OTHER OSTEOPOROSIS, UNSPECIFIED PATHOLOGICAL FRACTURE PRESENCE: ICD-10-CM

## 2023-11-08 DIAGNOSIS — M81.0 OSTEOPOROSIS, UNSPECIFIED OSTEOPOROSIS TYPE, UNSPECIFIED PATHOLOGICAL FRACTURE PRESENCE: Primary | ICD-10-CM

## 2023-11-08 LAB
ALBUMIN SERPL-MCNC: 4.7 G/DL (ref 3.5–5.2)
ALBUMIN/GLOB SERPL: 1.6 G/DL
ALP SERPL-CCNC: 99 U/L (ref 39–117)
ALT SERPL W P-5'-P-CCNC: 20 U/L (ref 1–33)
ANION GAP SERPL CALCULATED.3IONS-SCNC: 9 MMOL/L (ref 5–15)
AST SERPL-CCNC: 26 U/L (ref 1–32)
BASOPHILS # BLD AUTO: 0.01 10*3/MM3 (ref 0–0.2)
BASOPHILS NFR BLD AUTO: 0.1 % (ref 0–1.5)
BILIRUB SERPL-MCNC: 0.3 MG/DL (ref 0–1.2)
BUN SERPL-MCNC: 17 MG/DL (ref 8–23)
BUN/CREAT SERPL: 17.7 (ref 7–25)
CALCIUM SPEC-SCNC: 10.4 MG/DL (ref 8.6–10.5)
CHLORIDE SERPL-SCNC: 104 MMOL/L (ref 98–107)
CO2 SERPL-SCNC: 27 MMOL/L (ref 22–29)
CREAT SERPL-MCNC: 0.96 MG/DL (ref 0.57–1)
DEPRECATED RDW RBC AUTO: 44.7 FL (ref 37–54)
EGFRCR SERPLBLD CKD-EPI 2021: 66.6 ML/MIN/1.73
EOSINOPHIL # BLD AUTO: 0.08 10*3/MM3 (ref 0–0.4)
EOSINOPHIL NFR BLD AUTO: 1.2 % (ref 0.3–6.2)
ERYTHROCYTE [DISTWIDTH] IN BLOOD BY AUTOMATED COUNT: 13.1 % (ref 12.3–15.4)
GLOBULIN UR ELPH-MCNC: 2.9 GM/DL
GLUCOSE SERPL-MCNC: 98 MG/DL (ref 65–99)
HCT VFR BLD AUTO: 42.4 % (ref 34–46.6)
HGB BLD-MCNC: 14 G/DL (ref 12–15.9)
LYMPHOCYTES # BLD AUTO: 1.23 10*3/MM3 (ref 0.7–3.1)
LYMPHOCYTES NFR BLD AUTO: 17.8 % (ref 19.6–45.3)
MAGNESIUM SERPL-MCNC: 2.1 MG/DL (ref 1.6–2.4)
MCH RBC QN AUTO: 31.5 PG (ref 26.6–33)
MCHC RBC AUTO-ENTMCNC: 33 G/DL (ref 31.5–35.7)
MCV RBC AUTO: 95.5 FL (ref 79–97)
MONOCYTES # BLD AUTO: 0.48 10*3/MM3 (ref 0.1–0.9)
MONOCYTES NFR BLD AUTO: 6.9 % (ref 5–12)
NEUTROPHILS NFR BLD AUTO: 5.12 10*3/MM3 (ref 1.7–7)
NEUTROPHILS NFR BLD AUTO: 74 % (ref 42.7–76)
PHOSPHATE SERPL-MCNC: 2.3 MG/DL (ref 2.5–4.5)
PLATELET # BLD AUTO: 193 10*3/MM3 (ref 140–450)
PMV BLD AUTO: 11.6 FL (ref 6–12)
POTASSIUM SERPL-SCNC: 4 MMOL/L (ref 3.5–5.2)
PROT SERPL-MCNC: 7.6 G/DL (ref 6–8.5)
RBC # BLD AUTO: 4.44 10*6/MM3 (ref 3.77–5.28)
SODIUM SERPL-SCNC: 140 MMOL/L (ref 136–145)
WBC NRBC COR # BLD: 6.92 10*3/MM3 (ref 3.4–10.8)

## 2023-11-08 PROCEDURE — 25810000003 SODIUM CHLORIDE 0.9 % SOLUTION: Performed by: OBSTETRICS & GYNECOLOGY

## 2023-11-08 PROCEDURE — 80053 COMPREHEN METABOLIC PANEL: CPT | Performed by: OBSTETRICS & GYNECOLOGY

## 2023-11-08 PROCEDURE — 96374 THER/PROPH/DIAG INJ IV PUSH: CPT

## 2023-11-08 PROCEDURE — 83735 ASSAY OF MAGNESIUM: CPT | Performed by: OBSTETRICS & GYNECOLOGY

## 2023-11-08 PROCEDURE — 25010000002 ZOLEDRONIC ACID 5 MG/100ML SOLUTION: Performed by: OBSTETRICS & GYNECOLOGY

## 2023-11-08 PROCEDURE — 96365 THER/PROPH/DIAG IV INF INIT: CPT

## 2023-11-08 PROCEDURE — 84100 ASSAY OF PHOSPHORUS: CPT | Performed by: OBSTETRICS & GYNECOLOGY

## 2023-11-08 PROCEDURE — 85025 COMPLETE CBC W/AUTO DIFF WBC: CPT | Performed by: OBSTETRICS & GYNECOLOGY

## 2023-11-08 RX ORDER — ZOLEDRONIC ACID 5 MG/100ML
5 INJECTION, SOLUTION INTRAVENOUS ONCE
Status: COMPLETED | OUTPATIENT
Start: 2023-11-08 | End: 2023-11-08

## 2023-11-08 RX ORDER — SODIUM CHLORIDE 9 MG/ML
250 INJECTION, SOLUTION INTRAVENOUS ONCE
Status: COMPLETED | OUTPATIENT
Start: 2023-11-08 | End: 2023-11-08

## 2023-11-08 RX ORDER — THIAMINE HCL 100 MG
1 TABLET ORAL DAILY
COMMUNITY

## 2023-11-08 RX ORDER — SODIUM CHLORIDE 9 MG/ML
250 INJECTION, SOLUTION INTRAVENOUS ONCE
Status: CANCELLED | OUTPATIENT
Start: 2023-11-08

## 2023-11-08 RX ORDER — ZOLEDRONIC ACID 5 MG/100ML
5 INJECTION, SOLUTION INTRAVENOUS ONCE
Status: CANCELLED | OUTPATIENT
Start: 2023-11-08

## 2023-11-08 RX ADMIN — SODIUM CHLORIDE 250 ML: 9 INJECTION, SOLUTION INTRAVENOUS at 13:45

## 2023-11-08 RX ADMIN — ZOLEDRONIC ACID 5 MG: 0.05 INJECTION, SOLUTION INTRAVENOUS at 13:47

## 2023-11-08 NOTE — PROGRESS NOTES
Patient is here for her first reclast. PIV started with positive blood return noted and labs drawn per protocol. Questions answered and patient verbalized understanding for treatment. Patient verbalized understanding to call her doctor if she has any issues after treatment today and to to the ER for any emergencies. Patient stated she is taking a calcium and vit d supplement and she just saw her dentist who did some work on her gums and not her teeth. Patient stated the dentist said that she was fine to get the reclast today. Patient wanted to have the reclast ran over 20 minutes instead of 15 minutes. Patient tolerated treatment and was discharged with AVS after the 30 min observation period.

## 2023-11-10 DIAGNOSIS — R79.89 LOW SERUM PHOSPHATE: Primary | ICD-10-CM

## 2023-11-10 DIAGNOSIS — M81.0 OSTEOPOROSIS, UNSPECIFIED OSTEOPOROSIS TYPE, UNSPECIFIED PATHOLOGICAL FRACTURE PRESENCE: ICD-10-CM

## 2023-11-21 ENCOUNTER — TELEPHONE (OUTPATIENT)
Dept: FAMILY MEDICINE CLINIC | Facility: CLINIC | Age: 63
End: 2023-11-21
Payer: COMMERCIAL

## 2023-11-22 ENCOUNTER — LAB (OUTPATIENT)
Dept: FAMILY MEDICINE CLINIC | Facility: CLINIC | Age: 63
End: 2023-11-22
Payer: COMMERCIAL

## 2023-11-22 DIAGNOSIS — M81.0 OSTEOPOROSIS, UNSPECIFIED OSTEOPOROSIS TYPE, UNSPECIFIED PATHOLOGICAL FRACTURE PRESENCE: ICD-10-CM

## 2023-11-22 DIAGNOSIS — R79.89 LOW SERUM PHOSPHATE: ICD-10-CM

## 2023-11-22 LAB — PHOSPHATE SERPL-MCNC: 2.9 MG/DL (ref 2.5–4.5)

## 2023-11-22 PROCEDURE — 84100 ASSAY OF PHOSPHORUS: CPT | Performed by: FAMILY MEDICINE

## 2023-11-22 PROCEDURE — 36415 COLL VENOUS BLD VENIPUNCTURE: CPT

## 2024-01-08 ENCOUNTER — LAB (OUTPATIENT)
Dept: FAMILY MEDICINE CLINIC | Facility: CLINIC | Age: 64
End: 2024-01-08
Payer: COMMERCIAL

## 2024-01-08 ENCOUNTER — OFFICE VISIT (OUTPATIENT)
Dept: FAMILY MEDICINE CLINIC | Facility: CLINIC | Age: 64
End: 2024-01-08
Payer: COMMERCIAL

## 2024-01-08 VITALS
SYSTOLIC BLOOD PRESSURE: 128 MMHG | RESPIRATION RATE: 16 BRPM | OXYGEN SATURATION: 99 % | DIASTOLIC BLOOD PRESSURE: 83 MMHG | BODY MASS INDEX: 23.3 KG/M2 | TEMPERATURE: 97.2 F | HEART RATE: 74 BPM | WEIGHT: 145 LBS | HEIGHT: 66 IN

## 2024-01-08 DIAGNOSIS — Z00.00 ENCOUNTER FOR WELL ADULT EXAM WITHOUT ABNORMAL FINDINGS: Primary | ICD-10-CM

## 2024-01-08 DIAGNOSIS — M81.0 OSTEOPOROSIS, UNSPECIFIED OSTEOPOROSIS TYPE, UNSPECIFIED PATHOLOGICAL FRACTURE PRESENCE: ICD-10-CM

## 2024-01-08 PROCEDURE — 36415 COLL VENOUS BLD VENIPUNCTURE: CPT | Performed by: FAMILY MEDICINE

## 2024-01-08 PROCEDURE — 80061 LIPID PANEL: CPT | Performed by: FAMILY MEDICINE

## 2024-01-08 PROCEDURE — 99396 PREV VISIT EST AGE 40-64: CPT | Performed by: FAMILY MEDICINE

## 2024-01-08 PROCEDURE — 82306 VITAMIN D 25 HYDROXY: CPT | Performed by: FAMILY MEDICINE

## 2024-01-08 RX ORDER — ZOLEDRONIC ACID 5 MG/100ML
5 INJECTION, SOLUTION INTRAVENOUS
COMMUNITY
Start: 2023-11-08

## 2024-01-08 RX ORDER — PANTOPRAZOLE SODIUM 20 MG/1
20 TABLET, DELAYED RELEASE ORAL DAILY
Qty: 90 TABLET | Refills: 3 | Status: SHIPPED | OUTPATIENT
Start: 2024-01-08

## 2024-01-08 NOTE — PROGRESS NOTES
Subjective   Angella Santos is a 63 y.o. female.     History of Present Illness     The patient comes in today for annual physical.  The patient is doing well denies anxiety, depressed mood, abdominal pain, nausea, dysuria, fatigue, cold intolerance, headache, cough, chest pain, palpitations, dizziness and SOB. The patient admits to dietary compliance is  fair  and exercising regularly.  She is a non-smoker.       The following portions of the patient's history were reviewed and updated as appropriate: past medical history, past social history, past surgical history and problem list.    Review of Systems   Constitutional:  Negative for activity change, appetite change, fatigue and fever.   HENT:  Negative for sore throat.    Eyes:  Negative for blurred vision.   Respiratory:  Negative for cough and shortness of breath.    Cardiovascular:  Negative for chest pain and palpitations.   Gastrointestinal:  Negative for abdominal pain and indigestion.   Endocrine: Negative for cold intolerance and polyphagia.   Neurological:  Negative for headache.   Psychiatric/Behavioral:  Negative for sleep disturbance and depressed mood. The patient is not nervous/anxious.        Objective   Physical Exam  Vitals reviewed.   Constitutional:       Appearance: She is well-developed.   Neck:      Thyroid: No thyromegaly.   Cardiovascular:      Heart sounds: Normal heart sounds.   Pulmonary:      Effort: Pulmonary effort is normal.      Breath sounds: Normal breath sounds. No wheezing.   Abdominal:      Tenderness: There is no abdominal tenderness.   Musculoskeletal:      Cervical back: Neck supple.   Neurological:      Mental Status: She is alert and oriented to person, place, and time.   Psychiatric:         Mood and Affect: Mood normal.         Vitals:    01/08/24 1245   BP: 128/83   Pulse: 74   Resp: 16   Temp: 97.2 °F (36.2 °C)   SpO2: 99%     Current Outpatient Medications on File Prior to Visit   Medication Sig Dispense Refill     Calcium Citrate-Vitamin D3 (CITRACAL) 315-6.25 MG-MCG tablet tablet Take 1 tablet by mouth Daily. Patient stated she is taking calcium with vit d not just calcium      clobetasol (TEMOVATE) 0.05 % cream 1 (one) gram as needed, Apply to external vulva 1-2 times weekly as needed. 15 g 2    Cyanocobalamin (B-12) 1000 MCG tablet Take 1 tablet by mouth Daily.      estradiol (ESTRACE) 0.1 MG/GM vaginal cream 1 (one) Gram to vagina as directed, Use fingertip amount nightly x 2 wks then 1-2 times weekly. 42.5 g 2    Magnesium 250 MG tablet Take 1 tablet by mouth Daily.      multivitamin with minerals tablet tablet       Omega-3 Fatty Acids (fish oil) 1000 MG capsule capsule       zoledronic acid (Reclast) 5 MG/100ML solution infusion 100 mL.       No current facility-administered medications on file prior to visit.         Assessment & Plan   Problems Addressed this Visit       Encounter for well adult exam without abnormal findings - Primary     Discussed healthy diet and  importance of regular exercise. Stressed importance of moderation in sodium/caffeine intake,  cholesterol, caloric balance, sufficient intake of fresh fruits and vegetables.           Relevant Orders    Lipid panel (Completed)    Osteoporosis    Relevant Orders    Vitamin D 25 hydroxy (Completed)     Diagnoses         Codes Comments    Encounter for well adult exam without abnormal findings    -  Primary ICD-10-CM: Z00.00  ICD-9-CM: V70.0     Osteoporosis, unspecified osteoporosis type, unspecified pathological fracture presence     ICD-10-CM: M81.0  ICD-9-CM: 733.00

## 2024-01-09 LAB
25(OH)D3 SERPL-MCNC: 57.3 NG/ML (ref 30–100)
CHOLEST SERPL-MCNC: 203 MG/DL (ref 0–200)
HDLC SERPL-MCNC: 47 MG/DL (ref 40–60)
LDLC SERPL CALC-MCNC: 133 MG/DL (ref 0–100)
LDLC/HDLC SERPL: 2.77 {RATIO}
TRIGL SERPL-MCNC: 130 MG/DL (ref 0–150)
VLDLC SERPL-MCNC: 23 MG/DL (ref 5–40)

## 2024-01-15 RX ORDER — ATORVASTATIN CALCIUM 10 MG/1
10 TABLET, FILM COATED ORAL DAILY
Qty: 90 TABLET | Refills: 3 | Status: SHIPPED | OUTPATIENT
Start: 2024-01-15 | End: 2024-01-16 | Stop reason: SDUPTHER

## 2024-01-16 RX ORDER — ATORVASTATIN CALCIUM 10 MG/1
10 TABLET, FILM COATED ORAL DAILY
Qty: 90 TABLET | Refills: 3 | Status: SHIPPED | OUTPATIENT
Start: 2024-01-16

## 2024-10-07 ENCOUNTER — TRANSCRIBE ORDERS (OUTPATIENT)
Dept: ADMINISTRATIVE | Facility: HOSPITAL | Age: 64
End: 2024-10-07
Payer: COMMERCIAL

## 2024-10-07 DIAGNOSIS — M81.0 AGE RELATED OSTEOPOROSIS, UNSPECIFIED PATHOLOGICAL FRACTURE PRESENCE: Primary | ICD-10-CM

## 2024-10-07 DIAGNOSIS — Z12.31 VISIT FOR SCREENING MAMMOGRAM: ICD-10-CM

## 2024-10-21 ENCOUNTER — TRANSCRIBE ORDERS (OUTPATIENT)
Dept: ADMINISTRATIVE | Facility: HOSPITAL | Age: 64
End: 2024-10-21
Payer: COMMERCIAL

## 2024-10-21 ENCOUNTER — HOSPITAL ENCOUNTER (OUTPATIENT)
Dept: MAMMOGRAPHY | Facility: HOSPITAL | Age: 64
Discharge: HOME | End: 2024-10-21
Payer: COMMERCIAL

## 2024-10-21 ENCOUNTER — LAB (OUTPATIENT)
Dept: LAB | Facility: HOSPITAL | Age: 64
End: 2024-10-21
Payer: COMMERCIAL

## 2024-10-21 ENCOUNTER — HOSPITAL ENCOUNTER (OUTPATIENT)
Dept: BONE DENSITY | Facility: HOSPITAL | Age: 64
Discharge: HOME | End: 2024-10-21
Payer: COMMERCIAL

## 2024-10-21 DIAGNOSIS — M81.0 SENILE OSTEOPOROSIS: ICD-10-CM

## 2024-10-21 DIAGNOSIS — Z12.31 VISIT FOR SCREENING MAMMOGRAM: ICD-10-CM

## 2024-10-21 DIAGNOSIS — M81.0 AGE RELATED OSTEOPOROSIS, UNSPECIFIED PATHOLOGICAL FRACTURE PRESENCE: ICD-10-CM

## 2024-10-21 DIAGNOSIS — M81.0 SENILE OSTEOPOROSIS: Primary | ICD-10-CM

## 2024-10-21 LAB
CALCIUM SPEC-SCNC: 9.8 MG/DL (ref 8.6–10.5)
CREAT SERPL-MCNC: 0.92 MG/DL (ref 0.57–1)
EGFRCR SERPLBLD CKD-EPI 2021: 69.7 ML/MIN/1.73

## 2024-10-21 PROCEDURE — 77080 DXA BONE DENSITY AXIAL: CPT

## 2024-10-21 PROCEDURE — 77067 SCR MAMMO BI INCL CAD: CPT

## 2024-10-21 PROCEDURE — 82565 ASSAY OF CREATININE: CPT

## 2024-10-21 PROCEDURE — 82310 ASSAY OF CALCIUM: CPT

## 2024-10-21 PROCEDURE — 36415 COLL VENOUS BLD VENIPUNCTURE: CPT

## 2024-10-21 PROCEDURE — 77063 BREAST TOMOSYNTHESIS BI: CPT

## 2024-10-29 ENCOUNTER — TRANSCRIBE ORDERS (OUTPATIENT)
Dept: ADMINISTRATIVE | Facility: HOSPITAL | Age: 64
End: 2024-10-29
Payer: COMMERCIAL

## 2024-10-29 DIAGNOSIS — M81.0 OSTEOPOROSIS, POST-MENOPAUSAL: Primary | ICD-10-CM

## 2024-11-05 RX ORDER — SODIUM CHLORIDE 9 MG/ML
20 INJECTION, SOLUTION INTRAVENOUS ONCE
Status: CANCELLED | OUTPATIENT
Start: 2024-11-11

## 2024-11-05 RX ORDER — ZOLEDRONIC ACID 0.05 MG/ML
5 INJECTION, SOLUTION INTRAVENOUS ONCE
Status: CANCELLED | OUTPATIENT
Start: 2024-11-11

## 2024-11-11 ENCOUNTER — HOSPITAL ENCOUNTER (OUTPATIENT)
Dept: ONCOLOGY | Facility: HOSPITAL | Age: 64
Discharge: HOME | End: 2024-11-11
Admitting: OBSTETRICS & GYNECOLOGY
Payer: COMMERCIAL

## 2024-11-11 VITALS
BODY MASS INDEX: 23.29 KG/M2 | HEIGHT: 66 IN | SYSTOLIC BLOOD PRESSURE: 130 MMHG | DIASTOLIC BLOOD PRESSURE: 78 MMHG | TEMPERATURE: 97.7 F | HEART RATE: 65 BPM | RESPIRATION RATE: 14 BRPM | WEIGHT: 144.9 LBS | OXYGEN SATURATION: 100 %

## 2024-11-11 DIAGNOSIS — M81.8 OTHER OSTEOPOROSIS, UNSPECIFIED PATHOLOGICAL FRACTURE PRESENCE: Primary | ICD-10-CM

## 2024-11-11 LAB
MAGNESIUM SERPL-MCNC: 2.1 MG/DL (ref 1.6–2.4)
PHOSPHATE SERPL-MCNC: 2.9 MG/DL (ref 2.5–4.5)

## 2024-11-11 PROCEDURE — 25010000002 ZOLEDRONIC ACID 5 MG/100ML SOLUTION: Performed by: OBSTETRICS & GYNECOLOGY

## 2024-11-11 PROCEDURE — 96365 THER/PROPH/DIAG IV INF INIT: CPT

## 2024-11-11 PROCEDURE — 96374 THER/PROPH/DIAG INJ IV PUSH: CPT

## 2024-11-11 PROCEDURE — 83735 ASSAY OF MAGNESIUM: CPT | Performed by: OBSTETRICS & GYNECOLOGY

## 2024-11-11 PROCEDURE — 84100 ASSAY OF PHOSPHORUS: CPT | Performed by: OBSTETRICS & GYNECOLOGY

## 2024-11-11 RX ORDER — SODIUM CHLORIDE 9 MG/ML
20 INJECTION, SOLUTION INTRAVENOUS ONCE
Status: CANCELLED | OUTPATIENT
Start: 2024-11-11

## 2024-11-11 RX ORDER — ZOLEDRONIC ACID 0.05 MG/ML
5 INJECTION, SOLUTION INTRAVENOUS ONCE
Status: CANCELLED | OUTPATIENT
Start: 2024-11-11

## 2024-11-11 RX ORDER — ZOLEDRONIC ACID 0.05 MG/ML
5 INJECTION, SOLUTION INTRAVENOUS ONCE
Status: COMPLETED | OUTPATIENT
Start: 2024-11-11 | End: 2024-11-11

## 2024-11-11 RX ORDER — SODIUM CHLORIDE 9 MG/ML
20 INJECTION, SOLUTION INTRAVENOUS ONCE
Status: DISCONTINUED | OUTPATIENT
Start: 2024-11-11 | End: 2024-11-12 | Stop reason: HOSPADM

## 2024-11-11 RX ADMIN — ZOLEDRONIC ACID 5 MG: 0.05 INJECTION, SOLUTION INTRAVENOUS at 13:44

## 2024-12-09 ENCOUNTER — TELEPHONE (OUTPATIENT)
Dept: FAMILY MEDICINE CLINIC | Facility: CLINIC | Age: 64
End: 2024-12-09
Payer: COMMERCIAL

## 2024-12-09 DIAGNOSIS — Z00.00 ENCOUNTER FOR WELL ADULT EXAM WITHOUT ABNORMAL FINDINGS: Primary | ICD-10-CM

## 2025-01-08 ENCOUNTER — LAB (OUTPATIENT)
Dept: FAMILY MEDICINE CLINIC | Facility: CLINIC | Age: 65
End: 2025-01-08
Payer: COMMERCIAL

## 2025-01-08 DIAGNOSIS — Z00.00 ENCOUNTER FOR WELL ADULT EXAM WITHOUT ABNORMAL FINDINGS: ICD-10-CM

## 2025-01-08 LAB
ALBUMIN SERPL-MCNC: 4.2 G/DL (ref 3.5–5.2)
ALBUMIN/GLOB SERPL: 1.4 G/DL
ALP SERPL-CCNC: 72 U/L (ref 39–117)
ALT SERPL W P-5'-P-CCNC: 16 U/L (ref 1–33)
ANION GAP SERPL CALCULATED.3IONS-SCNC: 8.9 MMOL/L (ref 5–15)
AST SERPL-CCNC: 24 U/L (ref 1–32)
BILIRUB SERPL-MCNC: 0.5 MG/DL (ref 0–1.2)
BUN SERPL-MCNC: 20 MG/DL (ref 8–23)
BUN/CREAT SERPL: 20 (ref 7–25)
CALCIUM SPEC-SCNC: 9.6 MG/DL (ref 8.6–10.5)
CHLORIDE SERPL-SCNC: 102 MMOL/L (ref 98–107)
CHOLEST SERPL-MCNC: 208 MG/DL (ref 0–200)
CO2 SERPL-SCNC: 27.1 MMOL/L (ref 22–29)
CREAT SERPL-MCNC: 1 MG/DL (ref 0.57–1)
DEPRECATED RDW RBC AUTO: 41 FL (ref 37–54)
EGFRCR SERPLBLD CKD-EPI 2021: 63 ML/MIN/1.73
ERYTHROCYTE [DISTWIDTH] IN BLOOD BY AUTOMATED COUNT: 12 % (ref 12.3–15.4)
GLOBULIN UR ELPH-MCNC: 2.9 GM/DL
GLUCOSE SERPL-MCNC: 108 MG/DL (ref 65–99)
HCT VFR BLD AUTO: 38.8 % (ref 34–46.6)
HDLC SERPL-MCNC: 59 MG/DL (ref 40–60)
HGB BLD-MCNC: 13.3 G/DL (ref 12–15.9)
LDLC SERPL CALC-MCNC: 132 MG/DL (ref 0–100)
LDLC/HDLC SERPL: 2.19 {RATIO}
MCH RBC QN AUTO: 31.8 PG (ref 26.6–33)
MCHC RBC AUTO-ENTMCNC: 34.3 G/DL (ref 31.5–35.7)
MCV RBC AUTO: 92.8 FL (ref 79–97)
PLATELET # BLD AUTO: 171 10*3/MM3 (ref 140–450)
PMV BLD AUTO: 12.1 FL (ref 6–12)
POTASSIUM SERPL-SCNC: 4.6 MMOL/L (ref 3.5–5.2)
PROT SERPL-MCNC: 7.1 G/DL (ref 6–8.5)
RBC # BLD AUTO: 4.18 10*6/MM3 (ref 3.77–5.28)
SODIUM SERPL-SCNC: 138 MMOL/L (ref 136–145)
TRIGL SERPL-MCNC: 98 MG/DL (ref 0–150)
TSH SERPL DL<=0.05 MIU/L-ACNC: 2.88 UIU/ML (ref 0.27–4.2)
VLDLC SERPL-MCNC: 17 MG/DL (ref 5–40)
WBC NRBC COR # BLD AUTO: 6.09 10*3/MM3 (ref 3.4–10.8)

## 2025-01-08 PROCEDURE — 36415 COLL VENOUS BLD VENIPUNCTURE: CPT

## 2025-01-08 PROCEDURE — 80050 GENERAL HEALTH PANEL: CPT | Performed by: FAMILY MEDICINE

## 2025-01-08 PROCEDURE — 80061 LIPID PANEL: CPT | Performed by: FAMILY MEDICINE

## 2025-01-16 ENCOUNTER — OFFICE VISIT (OUTPATIENT)
Dept: FAMILY MEDICINE CLINIC | Facility: CLINIC | Age: 65
End: 2025-01-16
Payer: COMMERCIAL

## 2025-01-16 ENCOUNTER — LAB (OUTPATIENT)
Dept: FAMILY MEDICINE CLINIC | Facility: CLINIC | Age: 65
End: 2025-01-16
Payer: COMMERCIAL

## 2025-01-16 VITALS
DIASTOLIC BLOOD PRESSURE: 84 MMHG | HEART RATE: 62 BPM | HEIGHT: 66 IN | RESPIRATION RATE: 16 BRPM | TEMPERATURE: 97.9 F | OXYGEN SATURATION: 99 % | SYSTOLIC BLOOD PRESSURE: 132 MMHG | BODY MASS INDEX: 23.63 KG/M2 | WEIGHT: 147 LBS

## 2025-01-16 DIAGNOSIS — Z00.00 ENCOUNTER FOR WELL ADULT EXAM WITHOUT ABNORMAL FINDINGS: ICD-10-CM

## 2025-01-16 DIAGNOSIS — R73.9 HYPERGLYCEMIA: ICD-10-CM

## 2025-01-16 DIAGNOSIS — Z00.00 ENCOUNTER FOR WELL ADULT EXAM WITHOUT ABNORMAL FINDINGS: Primary | ICD-10-CM

## 2025-01-16 LAB — HBA1C MFR BLD: 5.7 % (ref 4.8–5.6)

## 2025-01-16 PROCEDURE — 83036 HEMOGLOBIN GLYCOSYLATED A1C: CPT | Performed by: FAMILY MEDICINE

## 2025-01-16 PROCEDURE — 99396 PREV VISIT EST AGE 40-64: CPT | Performed by: FAMILY MEDICINE

## 2025-01-16 PROCEDURE — 36415 COLL VENOUS BLD VENIPUNCTURE: CPT

## 2025-01-16 NOTE — PROGRESS NOTES
Subjective   Angella Santos is a 64 y.o. female.     History of Present Illness     History of Present Illness  The patient is a 64-year-old female who presents for a routine annual physical exam. She reports overall good health, with no complaints of shortness of breath, cough, congestion, sore throat, anxiety, abdominal pain, heartburn, nausea, or vomiting. She is current with her mammogram and colonoscopy screenings, with the last colonoscopy performed in 2018 and a mammogram and DEXA scan conducted in October 2024. She maintains an active lifestyle, engaging in pickleball several times a week and practicing yoga. She adheres to a healthy diet.    The following portions of the patient's history were reviewed and updated as appropriate: past medical history, past social history, past surgical history and problem list.    Review of Systems   Constitutional:  Negative for activity change and appetite change.   Respiratory:  Negative for shortness of breath.    Cardiovascular:  Negative for chest pain and palpitations.   Gastrointestinal:  Negative for abdominal pain, nausea and indigestion.   Neurological:  Negative for headache.   Psychiatric/Behavioral:  The patient is not nervous/anxious.        Results  Laboratory Studies on 1/8/2025  Total cholesterol 208, , fasting blood glucose 108. Normal renal function and thyroid blood work.    Objective   Physical Exam  Vitals reviewed.   Constitutional:       Appearance: She is well-developed.   Neck:      Thyroid: No thyromegaly.   Cardiovascular:      Heart sounds: Normal heart sounds.   Pulmonary:      Effort: Pulmonary effort is normal.      Breath sounds: Normal breath sounds. No wheezing.   Abdominal:      Tenderness: There is no abdominal tenderness.   Neurological:      Mental Status: She is alert and oriented to person, place, and time.   Psychiatric:         Mood and Affect: Mood normal.         Vitals:    01/16/25 0819   BP: 132/84   Pulse: 62   Resp: 16    Temp: 97.9 °F (36.6 °C)   SpO2: 99%     Current Outpatient Medications on File Prior to Visit   Medication Sig Dispense Refill    Calcium Citrate-Vitamin D3 (CITRACAL) 315-6.25 MG-MCG tablet tablet Take 1 tablet by mouth Daily. Patient stated she is taking calcium with vit d not just calcium      clobetasol propionate (TEMOVATE) 0.05 % cream Apply to external vulva 1-2 times weekly as needed. 15 g 2    Cyanocobalamin (B-12) 1000 MCG tablet Take 1 tablet by mouth Daily.      estradiol (ESTRACE) 0.1 MG/GM vaginal cream 1 (one) Gram to vagina as directed, Use fingertip amount nightly for 2 weeks then 1-2 times weekly. 42.5 g 2    Magnesium 250 MG tablet Take 1 tablet by mouth Daily.      multivitamin with minerals tablet tablet       pantoprazole (Protonix) 20 MG EC tablet Take 1 tablet by mouth Daily. 90 tablet 3    zoledronic acid (Reclast) 5 MG/100ML solution infusion 100 mL.       No current facility-administered medications on file prior to visit.         Assessment & Plan   Problems Addressed this Visit       Encounter for well adult exam without abnormal findings - Primary    Relevant Orders    Hemoglobin A1c    Hyperglycemia    Relevant Orders    Hemoglobin A1c     Diagnoses         Codes Comments    Encounter for well adult exam without abnormal findings    -  Primary ICD-10-CM: Z00.00  ICD-9-CM: V70.0     Hyperglycemia     ICD-10-CM: R73.9  ICD-9-CM: 790.29             Assessment & Plan  1. Annual physical examination.  Lab result reviewed her total cholesterol level is 208 mg/dL, LDL is 132 mg/dL, and fasting blood glucose is 108 mg/dL. Renal function and thyroid blood work are within normal limits.  She has tried atorvastatin in the past but did not tolerated due to side effect of muscle ache and joint pain.  Advise low-cholesterol diet and exercise.  She is up to date with her mammogram and colonoscopy, with the last colonoscopy in 2018 and a mammogram and DEXA scan in October 2024. A hemoglobin A1c test  will be conducted due to the elevated fasting blood sugar level.  We will check fasting lipid panel in 6 months.           Patient or patient representative verbalized consent for the use of Ambient Listening during the visit with  Nikhil Maloney MD for chart documentation. 1/16/2025  08:28 EST

## 2025-02-04 PROBLEM — S52.531A CLOSED COLLES' FRACTURE OF RIGHT RADIUS: Status: ACTIVE | Noted: 2025-02-04

## 2025-02-04 NOTE — H&P
Orthopaedic & Hand Surgery  H&P Update  Dr. Gaston Larry  [phone]    Name: Angella Santos   : 1960     Date: 25   Time: 06:59 EST    ------  This document is the preoperative History and Physical and is an extension of the last clinic note that is copied below.  ------    I have reviewed the patient's prior notes, interviewed and examined the patient on the day of surgery in the holding room.  The patient's condition has not changed, there are no new treatments available that obviate the need for surgery. The need for surgery still exists.  I have reviewed the procedure with the patient, answered any last-minute questions and have personally marked the operative site.    Physical exam this morning is unchanged from prior with the addition of:   CV: Regular rate and rhythm.    Respiratory: Non-labored breathing.     Medications:  No current facility-administered medications on file prior to encounter.     Current Outpatient Medications on File Prior to Encounter   Medication Sig Dispense Refill    acetaminophen (TYLENOL) 500 MG tablet Take 2 tablets by mouth Every 6 (Six) Hours As Needed for Mild Pain.      Calcium Citrate-Vitamin D3 (CITRACAL) 315-6.25 MG-MCG tablet tablet Take 2 tablets by mouth Daily. Patient stated she is taking calcium with vit d not just calcium      clobetasol propionate (TEMOVATE) 0.05 % cream Apply to external vulva 1-2 times weekly as needed. (Patient taking differently: Apply 1 Application topically to the appropriate area as directed As Needed.) 15 g 2    Cyanocobalamin (B-12) 1000 MCG tablet Take 1 tablet by mouth Daily.      estradiol (ESTRACE) 0.1 MG/GM vaginal cream 1 (one) Gram to vagina as directed, Use fingertip amount nightly for 2 weeks then 1-2 times weekly. (Patient taking differently: Insert  into the vagina 1 (One) Time Per Week.) 42.5 g 2    Magnesium 250 MG tablet Take 2 tablets by mouth Every Night.      multivitamin with minerals tablet tablet Take  1 tablet by mouth Daily.      pantoprazole (Protonix) 20 MG EC tablet Take 1 tablet by mouth Daily. 90 tablet 3    zoledronic acid (Reclast) 5 MG/100ML solution infusion 100 mL 1 (One) Time. YEARLY  OSTEOPROSIS         Allergies:  Allergies   Allergen Reactions    Cephalexin Hives       Past Medical History:  Patient Active Problem List   Diagnosis    Encounter for well adult exam without abnormal findings    Need for vaccination    Acute cystitis without hematuria    Encounter for hepatitis C screening test for low risk patient    Encounter for lipid screening for cardiovascular disease    Osteoporosis    Hyperglycemia    Closed Colles' fracture of right radius       Family History:  Family History   Problem Relation Age of Onset    Arthritis Mother     Diabetes Mother     Heart disease Mother     Cancer Mother     Breast cancer Mother     Hypertension Father     Alcohol abuse Father     Early death Father     Kidney disease Sister     Diabetes Other     Hypertension Other     Hyperlipidemia Other     Cancer Other     Malig Hyperthermia Neg Hx          Review of Systems - Negative except as stated in the HPI    The most recent clinic note (with her HPI and indications for surgery today) is pasted below:    Name BABATUNDE BUENROSTRO (63yo, F) ID# [id number] Appt. Date/Time 2025 08:00AM   1960 Service Dept. IND LOC* [facility] *  Provider GRUPO ADDISON MD  Insurance   Med Primary[place]EPO[place]  Insurance # : [id number]  Policy/Group # : [id number]  Prescription: RXSENSE - Member is eligible. details  Chief Complaint  Right wrist pain  Patient's Care Team  Primary Care Provider: CHELSEA JOHN MD: [address] [address], Ph [phone], Fax [phone] NPI: [id number]  Patient's Pharmacies  [facility] ([facility]): [facility] [place] [address], Ph [phone], Fax [phone]  [id number] ([place]): [address],  [place] [address], Ph [phone], Fax [phone]  Vitals  2025-01-29 07:58  Ht: 5 ft 5 in  Wt: 145 lbs  BMI: 24.1  Body Surface Area: 1.74 m²  Allergies  Reviewed Allergies  KEFLEX   Medications  Reviewed Medications  Calcium 500 + D 500 mg-10 mcg (400 unit) chewable tablet  1 tablet(s) every day.  03/31/23   entered Neville Wang MD  clobetasoL 0.05 % topical cream  07/31/23   filled surescripts  estradioL 0.01% (0.1 mg/gram) vaginal cream  11/29/23   filled surescripts  magnesium 500 mg (as magnesium oxide) tablet  1 tablet(s) every day.  03/31/23   entered Neville Wang MD  pantoprazole 20 mg tablet,delayed release  04/18/24   filled surescripts  Reclast  once per year  06/12/24   entered Neville Wang MD  Vitamin B-12 1,000 mcg tablet  1 tablet(s) every day.  03/31/23   entered Neville Wang MD  Vaccines  Reviewed Vaccines  Vaccine Type Date Amt. Route Site NDC Lot # Mfr. Exp.  Date VIS VIS  Given Vaccinator  Influenza  influenza 10/01/22            Pneumococcal  Pneumococcal Conjugate 11/01/22            no flu 2024  current on covid vaccines    Problems  Reviewed Problems  Pain of right knee joint - Onset: 06/12/2024  Closed fracture of distal end of right radius - Onset: 01/29/2025  Family History  Reviewed Family History  Mother - Family history of Hypertension    - Family history of malignant neoplasm    - Diabetes mellitus  Brother - Family history of Hypertension  Father - Family history of Hypertension  Social History  Reviewed Social History  Public Health and Travel  Have you recently traveled abroad?: No  Have you been to an area known to be high risk for COVID-19?: No  In the 14 days before symptom onset, have you had close contact with a laboratory-confirmed COVID-19 while that case was ill?: No  In the 14 days before symptom onset, have you had close contact with a person who is under investigation for COVID-19 while that person was ill?: No  Substance Use  Do you or have you  ever smoked tobacco?: Never smoker  Do you or have you ever used any other forms of tobacco or nicotine?: No  Do you or have you ever used e-cigarettes or vape?: Never used electronic cigarettes  Do you or have you ever used smokeless tobacco?: Never used smokeless tobacco  What was the date of your most recent tobacco screening?: 01/29/2025  Has tobacco cessation counseling been provided?: No  What is your level of alcohol consumption?: Occasional  How many times per week do you consume alcohol?: 1-2 times per week  Have you ever been counseled for unhealthy alcohol use?: No  Do you use any illicit or recreational drugs?: No  Advance Directive  Do you have an advance directive?: Yes  Do you have a medical power of ?: Yes  Surgical History  Reviewed Surgical History  GYN History  Most Recent Bone Density: 10/21/2024.  Was the recent bone density a DEXA or DXA?: Y.  Past Medical History  Reviewed Past Medical History  Arthritis: Y  Gastrointestinal Disease/GERD: Y  High Cholesterol: Y  HPI  Initial history obtained 1/29/2025:    63y/o right-hand-dominant female c/o right wrist pain    Onset: 1/25/2025  Location: Right wrist  Timing: Constant  Quality: Aching with soreness and decreased range of motion. No numbness or paresthesias.  Severity: 3/10 at best and 7/10 at worst  Modifying factors: Improved with ibuprofen, Tylenol, ice and splint immobilization  Context: Sustained injury when she slipped and fell on the ice, landing backwards with her hand outstretched behind her. She had immediate onset of pain. She presented to the urgent care where a distal radius fracture was identified on imaging. She was placed into a splint and referred to me for further evaluation and management.    She notes that she has been placed on Reclast for osteoporosis and that this has transitioned her back to osteopenia. She enjoys yoga and pickleball. She is retired. She is seen with her  present today.  KAYKAY MCCRACKEN as noted  in the HPI  Physical Exam  General:  The patient's general appearance was well-nourished, well-hydrated, no acute distress.  Orientation was alert and conversant    Musculoskeletal: Right hand and wrist  Inspection: No gross deformity at the wrist, however, swelling is present. Ecchymosis is present. Skin is intact, warm and well perfused.  Palpation: Tender palpation over the dorsal distal radius. Not at the ulnar styloid. Not tender more proximally in the forearm or distally in the hand or fingers.  Sensation: Intact to light touch in median, ulnar and radial nerve distributions  Motor: Able to flex, extend and abduct the fingers. Able to abduct the thumb orthogonal to the plane of the palm.  Vascular: Brisk cap refill to all digits and 2+ radial pulse. Modified Fernandez's test is normal  Joint stability and range of motion: On attempted composite fist, she comes to 1 cm from the distal palmar crease. She is able to fully extend the fingers. Able to oppose the thumb to the tip of the ring finger. Wrist extension 18°. Wrist flexion 15°.  Assessment / Plan  64-year-old right-hand-dominant female with:    1. Right distal radius intra-articular fracture with comminution and displacement sustained 1/25/2025. We discussed the nature of the injury today including relevant anatomy, natural history and treatment options both conservative and surgical. Conservative treatment would include casting with or without attempted closed reduction. Surgical treatment would entail open reduction with internal fixation using volar locked plating. Risks, benefits and alternatives are as noted below. I explained that for older patients, conservative treatment is often equivalent to surgery in terms of long-term patient outcomes, however, there is some evidence there for intra-articular fractures, patient's have better long-term outcomes with surgery rather than casting. A lot depends on the level of the patient's function. We had a very  "thorough discussion about the alternatives. I noted that the generally excepted cutoff for \"young versus elderly\" was 65 and she is under this graft, however, she is in a gray zone where I have reasonable equipoise regarding the 2 alternatives with a slight biased toward surgery. Following this discussion, questions were solicited and answered to her and her 's satisfaction. She voiced understanding of the nature of the condition, indicated surgery, the risks, benefits and alternatives. Given that she remains active with pickleball and yoga and has a desire for earlier mobilization and therapy, she expressed a desire to proceed with surgical management. Verbal consent was obtained. In keeping with facility practice, written consent will be obtained on the day of procedure.    We additionally discussed that she can consider her options and, if she elects to proceed with nonoperative management, she will call to cancel surgery. To keep her comfortable, she will be placed in a cast today that will be removed on the day of surgery. If she does not undergo surgery, I will plan to see her back in 2 weeks for reassessment and cast change.    1. Closed fracture of distal end of right radius -  1/25/2025  S52.501A: Unspecified fracture of the lower end of right radius, initial encounter for closed fracture  XR, WRIST, 3 OR MORE VIEW  Side: RIGHT    XR, WRIST, 3 OR MORE VIEW  Side: RIGHT  Result:  - XRAY WRIST 3+ VIEW: Performed  Result Note: Radiographs of the right wrist (4 views) were obtained today and reviewed by me. My impression is: Redemonstration of comminuted distal radius intra-articular fracture without significant displacement at the articular surface. Dorsal angulation of 12° and radial inclination of 13°. Maintained radial height. Nondisplaced ulnar styloid fracture additionally noted. Degenerative changes at the trapeziometacarpal joint are incidentally noted.    XR, WRIST, 3 OR MORE VIEW  Side: " RIGHT  Radiographs of the right wrist were previously obtained and interpreted on 1/25/2025. I have independently reviewed the images and agree with radiologist impression as noted below.  XR WRIST 3+ VW RIGHT    Date of Exam: 1/25/2025 2:50 PM EST    Indication: slip & fall on ice; right wrist pain 64-year-old    Comparison: None available.    Findings:  There is an acute slightly angulated and minimally impacted comminuted distal radial metaphyseal fracture with extension to the radiocarpal joint and radial ulnar joint. There is also a fracture at the base of the ulnar styloid process. There is  associated soft tissue swelling and joint effusion. On the lateral view a possible triquetral dorsal avulsion fracture versus projection relative to the x-ray beam is noted the carpal bones otherwise are intact. There is degenerative change with mild  joint space narrowing and small osteophytes at the first CMC joint.    IMPRESSION:  Impression:  1.Comminuted slightly impacted and angulated distal radial metaphyseal fracture with extension to the radiocarpal and radial ulnar joints. There is also a fracture at the base of the ulnar styloid process.  2.There is a questionable small triquetral fracture on the lateral view versus summation artifact and positioning. There is a joint effusion and associated soft tissue swelling diffusely around the wrist.    Electronically Signed: Zack Camacho DO  1/25/2025 3:32 PM EST  Workstation ID: [id number]    Patient Instructions  1. Plan for surgery as noted below:  - Diagnosis: Right distal radius fracture  - Proposed surgery: Right distal radius fracture open reduction internal fixation (CPT 32329)  - Special considerations/equipment: [employer] distal radius fixation set, MAC/regional anesthesia, tourniquet control, mini fluoroscopic imaging, supine with hand extended on an armboard  - Risks of surgery: Pain, bleeding, infection, damage to nerves, blood vessels or other surrounding  structures, incomplete relief and/or recurrence of the condition, stiffness, scar, nonunion/malunion, hardware failure, further procedures, unforeseen risks of surgery including stroke and death.  -Alternatives to surgery: no surgery, casting    2. 1. Application of right short arm cast for distal radius fracture by me today (CPT 90094).  2. Cast care instructions reviewed.  3. Physician directed exercises for finger range of motion at this time.  4. Follow-up in 2 weeks for assessment of fracture morphology and cast change. 4 views of the right wrist at that time after cast removal.    If she undergoes surgery, she will require routine postoperative evaluation at 10 to 14 days for wound inspection and suture removal.    Return to Office  to see Gaston Larry MD at [facility] * on or around 02/12/2025  Gaston Larry MD for UE RETURN at [facility] * on 02/12/2025 at 09:40 AM  Encounter Sign-Off  Encounter signed-off by Gaston Larry MD, 01/29/2025.  Encounter performed and documented by Gaston Larry MD  Encounter reviewed & signed by Gaston Larry MD on 01/29/2025 at 04:50 PM    -----    I have evaluated the patient and determined that she is stable and cleared for surgery in the ambulatory setting.      Gaston Larry MD, PhD  Orthopaedic & Hand Surgery  [facility]  [phone] - [place] [facility]  [phone] - [facility]

## 2025-02-04 NOTE — DISCHARGE INSTRUCTIONS
Orthopaedic & Hand Surgery  Discharge Instructions  Dr. Gaston Larry  [phone]    INCISION CARE  The postoperative dressings should be left in place until your follow-up appointment.  You will receive instructions at this appointment on whether your injury requires continued immobilization.  Your surgeon will instruct you regarding suture or staple removal. In general, this happens at the 1-2-week postoperative appointment.  Once postoperative splinting is discontinued, new dry dressings can then be placed around the wound and held in place with an Ace wrap.   Dressings should be changed at least daily or if visibly soiled.  Wash your hands prior to dressing changes  No creams or ointments to the incision until 3 weeks post op.   You may remove dressing once the incision is completely free of drainage.  Do not touch or pick at the incision  Check incision every day and notify surgeon immediately if any of the following signs or symptoms are seen:  Increase in redness  Increase in swelling around the incision and of the entire extremity  Increase in pain  NEW drainage or oozing from the incision  Pulling apart of the edges of the incision  Increase in overall body temperature (greater than 100.4°F)    ACTIVITIES  Exercises:  Physical therapy may or may not be needed after surgery. Once some healing has occurred, it will be possible to start therapy if you wish. However, most patients get their function back fairly well after surgery without formal therapy.  Activities of Daily Living:   Showering may begin immediately if the postoperative dressing can be protected. The dressing/splint and incision cannot get wet after surgery.   No tub baths, hot tubs, or swimming pools for 4 weeks.  May shower and let water run over the incision once the sutures are removed if there is no drainage and the wound is well healing. A new dry dressing can be applied after showering.       Restrictions  Weight: Do not put  weight on the injured arm until instructed to do so. Your surgeon will discuss with restrictions in terms of activities allowed. In general, anything more strenuous than holding a pen or piece of paper should be avoided, the other hand should do the vast majority of the work until the injured side had healed enough that it is not painful.  Driving: Many patients have questions about when it is safe to return to driving. The answer is that this is extremely variable. It depends on how quickly you heal. Until you can safely navigate the steering wheel, and are off of all narcotics, driving is not permitted. Your surgeon cannot “clear” you to return to driving, only you can make the decision when you feel it is safe.     Pain Control  Ice:  Ice is an excellent pain reliever. This can be used regardless of whether or not you are taking pain medication.  Apply an ice pack to the surgical area for 20 minutes at a time, removing it for at least an hour to prevent frostbite.  You should keep a towel between any dressings on the ice pack to prevent them from getting damp and from developing frostbite on the operative site.  Elevation:  Elevation is another easy way to control pain after surgery. Whenever possible, keep the operative limb elevated above the level of your heart to reduce swelling.  Acetaminophen (Tylenol):  CLASSIFICATION: A non-narcotic medication that is available without a prescription.  Acetaminophen controls pain but does not affect swelling or inflammation.  DRIVING: Acetaminophen will not impair your ability to drive. It is safe to drive while taking if your physical condition does not limit you.  POTENTIAL SIDE EFFECTS: nausea, stomach upset, liver failure if taken in large doses.  Interactions: Some narcotic medications contain acetaminophen. If you have a narcotic prescription, make sure to cut back on the acetaminophen if you are taking the narcotic. You should never take more 3000 mg of  acetaminophen in one 24-hour period.  DOSAGE:  Following surgery, you may take two regular strength (325 mg) tabs to control pain every 6 hours. This can be taken with NSAIDs (see below) or alternating the two.  After the initial surgical pain begins to resolve, you may begin to decrease the pain medication. By the end of a few weeks, you should be off of pain medications.  NSAIDS: This includes aspirin, ibuprofen, naproxen, Motrin, Aleve, Mobic, Celebrex  CLASSIFICATION: These are non-narcotic medications that are available without a prescription.  They are particularly effective at reducing swelling and inflammation  DRIVING: These medications will not impair your ability to drive. It is safe to drive while taking these medications if your physical condition does not limit you.  POTENTIAL SIDE EFFECTS: nausea, stomach upset, ulcer, gastric bleeding, kidney failure.  DOSAGE:  Following surgery, you may take ibuprofen (Motrin) 600 mg to control pain every 6 hours with food. It helps to take it scheduled (around the clock) to allow it to help reduce swelling.  After the initial surgical pain begins to resolve, you may begin to decrease the pain medication. By the end of a few weeks, you should be off of pain medications.  Narcotic Pain Medications: Utilized after surgery. This is some general information about these medications.  CLASSIFICATION: Prescription pain medications are called Opioids and are narcotics  LEGALITIES: It is illegal to share narcotics with others  DRIVING: it is illegal to drive while under the influence of narcotics. Doing so is a DUI.  POTENTIAL SIDE EFFECTS: nausea, vomiting, itching, dizziness, drowsiness, dry mouth, constipation, and difficulty urinating.  POTENTIAL ADVERSE EFFECTS:  Opioid tolerance can develop with use of pain medications and this simply means that it requires more and more of the medication to control pain. However, this is seen more in patients that use opioids for  longer periods of time.  Opioid dependence can develop with use of Opioids. People with opioid dependence will experience withdrawal symptoms upon cessation of the medication.  Opioid addiction can develop with use of Opioids. The incidence of this is very unlikely in patients who take the medications as ordered and stop the medications as instructed.  Opioid overdose can be dangerous, but is unlikely when the medication is taken as ordered and stopped when ordered. It is important not to mix opioids with alcohol as this can lead to over sedation and respiratory difficulty.  DOSAGE:  After the initial surgical pain begins to resolve, you should begin to decrease the pain medication dose and frequency. By the end of a few weeks, you should be off of narcotic pain medications.  Refills will not be given by the office during evening hours, on weekends, or after 6 weeks post-op. You are responsible for weaning off of pain medication.  To seek refills on pain medications during the initial 6-week post-operative period, you must call the office to request the refill. The office will then notify you when to  the prescription. DO NOT wait until you are out of the medication to request a refill. Prescriptions will not be filled over the weekend and it may take a couple days for the prescription to be available. Someone will have to pick the prescription in person at the office.    Other Medications  Anticoagulants: After upper extremity surgery most patients do not require an anticoagulant unless you have another injury that will be keeping you from mobilizing.  If you were already taking an anticoagulant (commonly Aspirin, Coumadin, or Plavix) you will likely be resuming your normal dose postoperatively and will be continuing that medication at the discretion of the prescribing physician.  Stool Softeners: You will be at greater risk of constipation after surgery due to being less mobile and the narcotic pain  medications.  Take stool softeners as needed. Over the counter Colace 100 mg 1-2 capsules twice daily can be taken.  If stools become too loose or too frequent, please decreases the dosage or stop the stool softener.  If constipation occurs despite use of stool softeners, you are to continue the stool softeners and add a laxative (Milk of Magnesia 1 ounce daily as needed)  Drink plenty of fluids, and eat fruits and vegetables during your recovery time. Getting up and mobilizing will help the bowels to recover their regular function, as will weaning off of all narcotics when the pain becomes tolerable.    FOLLOW-UP VISITS  You will need to follow up in the office with your surgeon in 1-2 weeks, or as instructed elsewhere in your discharge paperwork. Please call this number [phone] to schedule this appointment if one has not already been made.   If you have any concerns or suspected complications prior to your follow up visit, please call the office. Do not wait until your appointment time if you suspect complications. These will need to be addressed in the office promptly.      Gaston Larry MD, PhD  Orthopaedic Surgery  [facility]           What to expect after a Nerve Block    Nerve blocks administered to block pain affect many types of nerves, including those nerves that control movement, pain, and normal sensation. Following a nerve block, you may notice some bruising at the site where the block was given. You may experience sensations such as: numbness of the affected area or limb, tingling, heaviness (that is the limb feels heavy to you), weakness or inability to move the affected arm or leg, or a feeling as if your arm or leg has “fallen asleep.”     A nerve block can last from 2 to 36 hours depending on the medications used.  Usually the weakness wears off first followed by the tingling and heaviness. As the block wears off, you may begin to notice pain; however, this sequence of  events may occur in any order. Typically, you will be able to move your limb before you will feel it. Once a nerve block begins to wear off, the effects are usually completely gone within 60 minutes.  If you experience continued side effects that you believe are block related for longer than 48 hours, please call your healthcare provider. Please see block-specific instructions below.    Instructions for any block involving the shoulder or arm  If you have had any kind of shoulder/arm block, you will go home with your arm in a sling. Wear the sling until the block has completely worn off. You may be required to wear it for a longer period of time per your surgeon’s recommendations.  If you have had a shoulder/arm block, it is a good idea to sleep on a recliner with pillows under your arm.    You may experience symptoms such as:  Shortness of breath  Hoarseness   Blurry vision  Unequal pupils  Drooping of your face on the same side as the block was performed    These are side effects associated with this kind of block and should go away within 12 hours.    Note: If you have severe or prolonged shortness of breath, please seek medical assistance as soon as possible.     Protection of a “blocked” arm or leg (limb)  After a nerve block, you cannot feel pain, pressure, or extremes of temperature in the affected limb. And because of this, your blocked limb is at more risk for injury. For example, it is possible to burn your limb on an extremely hot surface without feeling it.     When resting, it is important to reposition your limb periodically to avoid prolonged pressure on it. This may require the use of pillows and padding.    While sleeping, you should avoid rolling onto the affected limb or putting too much pressure on it.     If you have a cast or tight dressing, check the color of your fingers or toes of the affected limb. Call your surgeon if they look discolored (that is, dusky, dark colored).    Use caution in  cold weather. Cover your limb appropriately to protect it from the cold.      Pain Management:    Your surgeon will give you a prescription for pain medication. Begin taking this before the nerve block wears off. Bear in mind that sometimes the block can wear off in the middle of the night.

## 2025-02-05 ENCOUNTER — PRE-ADMISSION TESTING (OUTPATIENT)
Dept: PREADMISSION TESTING | Facility: HOSPITAL | Age: 65
End: 2025-02-05
Payer: COMMERCIAL

## 2025-02-05 ENCOUNTER — HOSPITAL ENCOUNTER (OUTPATIENT)
Dept: GENERAL RADIOLOGY | Facility: HOSPITAL | Age: 65
Discharge: HOME | End: 2025-02-05
Payer: COMMERCIAL

## 2025-02-05 VITALS
HEIGHT: 65 IN | TEMPERATURE: 97.8 F | BODY MASS INDEX: 24.11 KG/M2 | OXYGEN SATURATION: 100 % | RESPIRATION RATE: 20 BRPM | HEART RATE: 63 BPM | DIASTOLIC BLOOD PRESSURE: 80 MMHG | SYSTOLIC BLOOD PRESSURE: 133 MMHG | WEIGHT: 144.7 LBS

## 2025-02-05 LAB
BILIRUB UR QL STRIP: NEGATIVE
CLARITY UR: CLEAR
COLOR UR: YELLOW
GLUCOSE UR STRIP-MCNC: NEGATIVE MG/DL
HGB UR QL STRIP.AUTO: NEGATIVE
KETONES UR QL STRIP: NEGATIVE
LEUKOCYTE ESTERASE UR QL STRIP.AUTO: NEGATIVE
NITRITE UR QL STRIP: NEGATIVE
PH UR STRIP.AUTO: 8 [PH] (ref 5–8)
PROT UR QL STRIP: NEGATIVE
QT INTERVAL: 429 MS
QTC INTERVAL: 418 MS
SP GR UR STRIP: 1.01 (ref 1–1.03)
UROBILINOGEN UR QL STRIP: NORMAL

## 2025-02-05 PROCEDURE — 71046 X-RAY EXAM CHEST 2 VIEWS: CPT

## 2025-02-05 PROCEDURE — 81003 URINALYSIS AUTO W/O SCOPE: CPT

## 2025-02-05 PROCEDURE — 93005 ELECTROCARDIOGRAM TRACING: CPT

## 2025-02-05 PROCEDURE — 93010 ELECTROCARDIOGRAM REPORT: CPT | Performed by: INTERNAL MEDICINE

## 2025-02-05 NOTE — DISCHARGE INSTRUCTIONS
Take the following medications the morning of surgery:    PROTONIX      If you are on prescription narcotic pain medication to control your pain you may also take that medication the morning of surgery.      General Instructions:     Do not eat solid food after midnight the night before surgery.  Clear liquids day of surgery are allowed but must be stopped at least two hours before your hospital arrival time.       Allowed clear liquids      Water, sodas, and tea or coffee with no cream or milk added.       12 to 20 ounces of a clear liquid that contains carbohydrates is recommended.  If non-diabetic, have Gatorade or Powerade.  If diabetic, have G2 or Powerade Zero.     Do not have liquids red in color.  Do not consume chicken, beef, pork or vegetable broth or bouillon cubes of any variety as they are not considered clear liquids and are not allowed.      Infants may have breast milk up to four hours before surgery.  Infants drinking formula may drink formula up to six hours before surgery.   Patients who avoid smoking, chewing tobacco and alcohol for 4 weeks prior to surgery have a reduced risk of post-operative complications.  Quit smoking as many days before surgery as you can.  Do not smoke, use chewing tobacco or drink alcohol the day of surgery.   If applicable bring your C-PAP/ BI-PAP machine in with you to preop day of surgery.  Bring any papers given to you in the doctor’s office.  Wear clean comfortable clothes.  Do not wear contact lenses, false eyelashes or make-up.  Bring a case for your glasses.   Bring crutches or walker if applicable.  Remove all piercings.  Leave jewelry and any other valuables at home.  Hair extensions with metal clips must be removed prior to surgery.  The Pre-Admission Testing nurse will instruct you to bring medications if unable to obtain an accurate list in Pre-Admission Testing.      Preventing a Surgical Site Infection:  For 2 to 3 days before surgery, avoid shaving with a  razor because the razor can irritate skin and make it easier to develop an infection.    Any areas of open skin can increase the risk of a post-operative wound infection by allowing bacteria to enter and travel throughout the body.  Notify your surgeon if you have any skin wounds / rashes even if it is not near the expected surgical site.  The area will need assessed to determine if surgery should be delayed until it is healed.  The night prior to surgery shower using a fresh bar of anti-bacterial soap (such as Dial) and clean washcloth.  Sleep in a clean bed with clean clothing.  Do not allow pets to sleep with you.  Shower on the morning of surgery using a fresh bar of anti-bacterial soap (such as Dial) and clean washcloth.  Dry with a clean towel and dress in clean clothing.  Ask your surgeon if you will be receiving antibiotics prior to surgery.  Make sure you, your family, and all healthcare providers clean their hands with soap and water or an alcohol based hand  before caring for you or your wound.    Day of surgery:  Your arrival time is approximately two hours before your scheduled surgery time.  Please note if you have an early arrival time the surgery doors do not open before 5:00 AM.  Upon arrival, a Pre-op nurse and Anesthesiologist will review your health history, obtain vital signs, and answer questions you may have.  The only belongings needed at this time will be a list of your home medications and if applicable your C-PAP/BI-PAP machine.  A Pre-op nurse will start an IV and you may receive medication in preparation for surgery, including something to help you relax.     Please be aware that surgery does come with discomfort.  We want to make every effort to control your discomfort so please discuss any uncontrolled symptoms with your nurse.   Your doctor will most likely have prescribed pain medications.      If you are going home after surgery you will receive individualized written care  instructions before being discharged.  A responsible adult must drive you to and from the hospital on the day of your surgery and ideally stay with you through the night.   .  Discharge prescriptions can be filled by the hospital pharmacy during regular pharmacy hours.  If you are having surgery late in the day/evening your prescription may be e-prescribed to your pharmacy.  Please verify your pharmacy hours or chose a 24 hour pharmacy to avoid not having access to your prescription because your pharmacy has closed for the day.    If you are staying overnight following surgery, you will be transported to your hospital room following the recovery period.  [facility] has all private rooms.    If you have any questions please call Pre-Admission Testing at [phone].  Deductibles and co-payments are collected on the day of service. Please be prepared to pay the required co-pay, deductible or deposit on the day of service as defined by your plan.    Call your surgeon immediately if you experience any of the following symptoms:  Sore Throat  Shortness of Breath or difficulty breathing  Cough  Chills  Body soreness or muscle pain  Headache  Fever  New loss of taste or smell  Do not arrive for your surgery ill.  Your procedure will need to be rescheduled to another time.  You will need to call your physician before the day of surgery to avoid any unnecessary exposure to hospital staff as well as other patients.

## 2025-02-07 ENCOUNTER — ANESTHESIA (OUTPATIENT)
Dept: PERIOP | Facility: HOSPITAL | Age: 65
End: 2025-02-07
Payer: COMMERCIAL

## 2025-02-07 ENCOUNTER — HOSPITAL ENCOUNTER (OUTPATIENT)
Facility: HOSPITAL | Age: 65
Setting detail: HOSPITAL OUTPATIENT SURGERY
Discharge: HOME | End: 2025-02-07
Attending: STUDENT IN AN ORGANIZED HEALTH CARE EDUCATION/TRAINING PROGRAM | Admitting: STUDENT IN AN ORGANIZED HEALTH CARE EDUCATION/TRAINING PROGRAM
Payer: COMMERCIAL

## 2025-02-07 ENCOUNTER — ANESTHESIA EVENT (OUTPATIENT)
Dept: PERIOP | Facility: HOSPITAL | Age: 65
End: 2025-02-07
Payer: COMMERCIAL

## 2025-02-07 ENCOUNTER — APPOINTMENT (OUTPATIENT)
Dept: GENERAL RADIOLOGY | Facility: HOSPITAL | Age: 65
End: 2025-02-07
Payer: COMMERCIAL

## 2025-02-07 VITALS
OXYGEN SATURATION: 98 % | TEMPERATURE: 97.6 F | DIASTOLIC BLOOD PRESSURE: 74 MMHG | HEART RATE: 65 BPM | RESPIRATION RATE: 16 BRPM | SYSTOLIC BLOOD PRESSURE: 118 MMHG

## 2025-02-07 DIAGNOSIS — S52.531A CLOSED COLLES' FRACTURE OF RIGHT RADIUS, INITIAL ENCOUNTER: Primary | ICD-10-CM

## 2025-02-07 PROCEDURE — C1713 ANCHOR/SCREW BN/BN,TIS/BN: HCPCS | Performed by: STUDENT IN AN ORGANIZED HEALTH CARE EDUCATION/TRAINING PROGRAM

## 2025-02-07 PROCEDURE — 25010000002 ROPIVACAINE PER 1 MG: Performed by: ANESTHESIOLOGY

## 2025-02-07 PROCEDURE — 25010000002 PHENYLEPHRINE 10 MG/ML SOLUTION: Performed by: NURSE ANESTHETIST, CERTIFIED REGISTERED

## 2025-02-07 PROCEDURE — 25810000003 LACTATED RINGERS PER 1000 ML: Performed by: ANESTHESIOLOGY

## 2025-02-07 PROCEDURE — 25010000002 LIDOCAINE PF 2% 2 % SOLUTION: Performed by: NURSE ANESTHETIST, CERTIFIED REGISTERED

## 2025-02-07 PROCEDURE — 25010000002 CLINDAMYCIN 900 MG/50ML SOLUTION: Performed by: STUDENT IN AN ORGANIZED HEALTH CARE EDUCATION/TRAINING PROGRAM

## 2025-02-07 PROCEDURE — 25010000002 FENTANYL CITRATE (PF) 50 MCG/ML SOLUTION: Performed by: ANESTHESIOLOGY

## 2025-02-07 PROCEDURE — 25010000002 DEXAMETHASONE PER 1 MG: Performed by: ANESTHESIOLOGY

## 2025-02-07 PROCEDURE — 25010000002 LIDOCAINE 1% - EPINEPHRINE 1:100000 1 %-1:100000 SOLUTION: Performed by: STUDENT IN AN ORGANIZED HEALTH CARE EDUCATION/TRAINING PROGRAM

## 2025-02-07 PROCEDURE — 76000 FLUOROSCOPY <1 HR PHYS/QHP: CPT

## 2025-02-07 PROCEDURE — 25010000002 PROPOFOL 10 MG/ML EMULSION: Performed by: NURSE ANESTHETIST, CERTIFIED REGISTERED

## 2025-02-07 PROCEDURE — 25010000002 MIDAZOLAM PER 1 MG: Performed by: ANESTHESIOLOGY

## 2025-02-07 DEVICE — 2.5 TRILOCK SCREW 16MM, HD7, 1/PKG
Type: IMPLANTABLE DEVICE | Site: WRIST | Status: FUNCTIONAL
Brand: APTUS

## 2025-02-07 DEVICE — 2.5 ADAPTIVE II TRILOCK DISTRAD.PL VOL R
Type: IMPLANTABLE DEVICE | Site: WRIST | Status: FUNCTIONAL
Brand: APTUS

## 2025-02-07 DEVICE — 2.5 TRILOCK EXPRESS SCREW 20MM,HD7,5/PKG
Type: IMPLANTABLE DEVICE | Site: WRIST | Status: FUNCTIONAL
Brand: APTUS

## 2025-02-07 DEVICE — 2.5 TRILOCK SCREW 12MM, HD7, 1/PKG
Type: IMPLANTABLE DEVICE | Site: WRIST | Status: FUNCTIONAL
Brand: APTUS

## 2025-02-07 DEVICE — 2.5 TRILOCK EXPRESS SCREW 18MM,HD7,5/PKG
Type: IMPLANTABLE DEVICE | Site: WRIST | Status: FUNCTIONAL
Brand: APTUS

## 2025-02-07 DEVICE — 2.5 CORTICAL SCREW 13MM, HD7, 1/PKG
Type: IMPLANTABLE DEVICE | Site: WRIST | Status: FUNCTIONAL
Brand: APTUS

## 2025-02-07 DEVICE — 2.5 TRILOCK SCREW 14MM, HD7, 1/PKG
Type: IMPLANTABLE DEVICE | Site: WRIST | Status: FUNCTIONAL
Brand: APTUS

## 2025-02-07 RX ORDER — DIPHENHYDRAMINE HYDROCHLORIDE 50 MG/ML
12.5 INJECTION INTRAMUSCULAR; INTRAVENOUS
Status: DISCONTINUED | OUTPATIENT
Start: 2025-02-07 | End: 2025-02-07 | Stop reason: HOSPADM

## 2025-02-07 RX ORDER — ONDANSETRON 2 MG/ML
4 INJECTION INTRAMUSCULAR; INTRAVENOUS ONCE AS NEEDED
Status: DISCONTINUED | OUTPATIENT
Start: 2025-02-07 | End: 2025-02-07 | Stop reason: HOSPADM

## 2025-02-07 RX ORDER — EPHEDRINE SULFATE 50 MG/ML
5 INJECTION, SOLUTION INTRAVENOUS ONCE AS NEEDED
Status: DISCONTINUED | OUTPATIENT
Start: 2025-02-07 | End: 2025-02-07 | Stop reason: HOSPADM

## 2025-02-07 RX ORDER — ACETAMINOPHEN 500 MG
1000 TABLET ORAL EVERY 6 HOURS PRN
COMMUNITY

## 2025-02-07 RX ORDER — MAGNESIUM HYDROXIDE 1200 MG/15ML
LIQUID ORAL AS NEEDED
Status: DISCONTINUED | OUTPATIENT
Start: 2025-02-07 | End: 2025-02-07 | Stop reason: HOSPADM

## 2025-02-07 RX ORDER — PROPOFOL 10 MG/ML
VIAL (ML) INTRAVENOUS AS NEEDED
Status: DISCONTINUED | OUTPATIENT
Start: 2025-02-07 | End: 2025-02-07 | Stop reason: SURG

## 2025-02-07 RX ORDER — FENTANYL CITRATE 50 UG/ML
25 INJECTION, SOLUTION INTRAMUSCULAR; INTRAVENOUS
Status: DISCONTINUED | OUTPATIENT
Start: 2025-02-07 | End: 2025-02-07 | Stop reason: HOSPADM

## 2025-02-07 RX ORDER — LIDOCAINE HYDROCHLORIDE AND EPINEPHRINE 10; 10 MG/ML; UG/ML
INJECTION, SOLUTION INFILTRATION; PERINEURAL AS NEEDED
Status: DISCONTINUED | OUTPATIENT
Start: 2025-02-07 | End: 2025-02-07 | Stop reason: HOSPADM

## 2025-02-07 RX ORDER — SODIUM CHLORIDE, SODIUM LACTATE, POTASSIUM CHLORIDE, CALCIUM CHLORIDE 600; 310; 30; 20 MG/100ML; MG/100ML; MG/100ML; MG/100ML
9 INJECTION, SOLUTION INTRAVENOUS CONTINUOUS
Status: DISCONTINUED | OUTPATIENT
Start: 2025-02-07 | End: 2025-02-07 | Stop reason: HOSPADM

## 2025-02-07 RX ORDER — PROMETHAZINE HYDROCHLORIDE 25 MG/1
25 SUPPOSITORY RECTAL ONCE AS NEEDED
Status: DISCONTINUED | OUTPATIENT
Start: 2025-02-07 | End: 2025-02-07 | Stop reason: HOSPADM

## 2025-02-07 RX ORDER — HYDROMORPHONE HYDROCHLORIDE 1 MG/ML
0.25 INJECTION, SOLUTION INTRAMUSCULAR; INTRAVENOUS; SUBCUTANEOUS
Status: DISCONTINUED | OUTPATIENT
Start: 2025-02-07 | End: 2025-02-07 | Stop reason: HOSPADM

## 2025-02-07 RX ORDER — MIDAZOLAM HYDROCHLORIDE 1 MG/ML
1 INJECTION, SOLUTION INTRAMUSCULAR; INTRAVENOUS
Status: DISCONTINUED | OUTPATIENT
Start: 2025-02-07 | End: 2025-02-07 | Stop reason: HOSPADM

## 2025-02-07 RX ORDER — IPRATROPIUM BROMIDE AND ALBUTEROL SULFATE 2.5; .5 MG/3ML; MG/3ML
3 SOLUTION RESPIRATORY (INHALATION) ONCE AS NEEDED
Status: DISCONTINUED | OUTPATIENT
Start: 2025-02-07 | End: 2025-02-07 | Stop reason: HOSPADM

## 2025-02-07 RX ORDER — SODIUM CHLORIDE 0.9 % (FLUSH) 0.9 %
3 SYRINGE (ML) INJECTION EVERY 12 HOURS SCHEDULED
Status: DISCONTINUED | OUTPATIENT
Start: 2025-02-07 | End: 2025-02-07 | Stop reason: HOSPADM

## 2025-02-07 RX ORDER — FENTANYL CITRATE 50 UG/ML
50 INJECTION, SOLUTION INTRAMUSCULAR; INTRAVENOUS ONCE AS NEEDED
Status: COMPLETED | OUTPATIENT
Start: 2025-02-07 | End: 2025-02-07

## 2025-02-07 RX ORDER — LIDOCAINE HYDROCHLORIDE 20 MG/ML
INJECTION, SOLUTION EPIDURAL; INFILTRATION; INTRACAUDAL; PERINEURAL AS NEEDED
Status: DISCONTINUED | OUTPATIENT
Start: 2025-02-07 | End: 2025-02-07 | Stop reason: SURG

## 2025-02-07 RX ORDER — DEXAMETHASONE SODIUM PHOSPHATE 4 MG/ML
INJECTION, SOLUTION INTRA-ARTICULAR; INTRALESIONAL; INTRAMUSCULAR; INTRAVENOUS; SOFT TISSUE
Status: COMPLETED | OUTPATIENT
Start: 2025-02-07 | End: 2025-02-07

## 2025-02-07 RX ORDER — PROMETHAZINE HYDROCHLORIDE 25 MG/1
25 TABLET ORAL ONCE AS NEEDED
Status: DISCONTINUED | OUTPATIENT
Start: 2025-02-07 | End: 2025-02-07 | Stop reason: HOSPADM

## 2025-02-07 RX ORDER — HYDROCODONE BITARTRATE AND ACETAMINOPHEN 7.5; 325 MG/1; MG/1
1 TABLET ORAL EVERY 4 HOURS PRN
Status: DISCONTINUED | OUTPATIENT
Start: 2025-02-07 | End: 2025-02-07 | Stop reason: HOSPADM

## 2025-02-07 RX ORDER — LIDOCAINE HYDROCHLORIDE 10 MG/ML
0.5 INJECTION, SOLUTION INFILTRATION; PERINEURAL ONCE AS NEEDED
Status: DISCONTINUED | OUTPATIENT
Start: 2025-02-07 | End: 2025-02-07 | Stop reason: HOSPADM

## 2025-02-07 RX ORDER — HYDROCODONE BITARTRATE AND ACETAMINOPHEN 5; 325 MG/1; MG/1
1 TABLET ORAL ONCE AS NEEDED
Status: DISCONTINUED | OUTPATIENT
Start: 2025-02-07 | End: 2025-02-07 | Stop reason: HOSPADM

## 2025-02-07 RX ORDER — HYDRALAZINE HYDROCHLORIDE 20 MG/ML
5 INJECTION INTRAMUSCULAR; INTRAVENOUS
Status: DISCONTINUED | OUTPATIENT
Start: 2025-02-07 | End: 2025-02-07 | Stop reason: HOSPADM

## 2025-02-07 RX ORDER — ROPIVACAINE HYDROCHLORIDE 5 MG/ML
INJECTION, SOLUTION EPIDURAL; INFILTRATION; PERINEURAL
Status: COMPLETED | OUTPATIENT
Start: 2025-02-07 | End: 2025-02-07

## 2025-02-07 RX ORDER — FAMOTIDINE 10 MG/ML
20 INJECTION, SOLUTION INTRAVENOUS ONCE
Status: COMPLETED | OUTPATIENT
Start: 2025-02-07 | End: 2025-02-07

## 2025-02-07 RX ORDER — LABETALOL HYDROCHLORIDE 5 MG/ML
5 INJECTION, SOLUTION INTRAVENOUS
Status: DISCONTINUED | OUTPATIENT
Start: 2025-02-07 | End: 2025-02-07 | Stop reason: HOSPADM

## 2025-02-07 RX ORDER — CLINDAMYCIN PHOSPHATE 900 MG/50ML
900 INJECTION, SOLUTION INTRAVENOUS ONCE
Status: COMPLETED | OUTPATIENT
Start: 2025-02-07 | End: 2025-02-07

## 2025-02-07 RX ORDER — FLUMAZENIL 0.1 MG/ML
0.2 INJECTION INTRAVENOUS AS NEEDED
Status: DISCONTINUED | OUTPATIENT
Start: 2025-02-07 | End: 2025-02-07 | Stop reason: HOSPADM

## 2025-02-07 RX ORDER — SODIUM CHLORIDE 0.9 % (FLUSH) 0.9 %
3-10 SYRINGE (ML) INJECTION AS NEEDED
Status: DISCONTINUED | OUTPATIENT
Start: 2025-02-07 | End: 2025-02-07 | Stop reason: HOSPADM

## 2025-02-07 RX ORDER — ATROPINE SULFATE 0.4 MG/ML
0.4 INJECTION, SOLUTION INTRAMUSCULAR; INTRAVENOUS; SUBCUTANEOUS ONCE AS NEEDED
Status: DISCONTINUED | OUTPATIENT
Start: 2025-02-07 | End: 2025-02-07 | Stop reason: HOSPADM

## 2025-02-07 RX ORDER — NALOXONE HCL 0.4 MG/ML
0.2 VIAL (ML) INJECTION AS NEEDED
Status: DISCONTINUED | OUTPATIENT
Start: 2025-02-07 | End: 2025-02-07 | Stop reason: HOSPADM

## 2025-02-07 RX ORDER — HYDROCODONE BITARTRATE AND ACETAMINOPHEN 5; 325 MG/1; MG/1
1 TABLET ORAL EVERY 4 HOURS PRN
Qty: 30 TABLET | Refills: 0 | Status: SHIPPED | OUTPATIENT
Start: 2025-02-07

## 2025-02-07 RX ORDER — PHENYLEPHRINE HYDROCHLORIDE 10 MG/ML
INJECTION INTRAVENOUS AS NEEDED
Status: DISCONTINUED | OUTPATIENT
Start: 2025-02-07 | End: 2025-02-07 | Stop reason: SURG

## 2025-02-07 RX ADMIN — FENTANYL CITRATE 50 MCG: 50 INJECTION, SOLUTION INTRAMUSCULAR; INTRAVENOUS at 06:29

## 2025-02-07 RX ADMIN — DEXAMETHASONE SODIUM PHOSPHATE 4 MG: 4 INJECTION, SOLUTION INTRA-ARTICULAR; INTRALESIONAL; INTRAMUSCULAR; INTRAVENOUS; SOFT TISSUE at 06:25

## 2025-02-07 RX ADMIN — SODIUM CHLORIDE, POTASSIUM CHLORIDE, SODIUM LACTATE AND CALCIUM CHLORIDE 9 ML/HR: 600; 310; 30; 20 INJECTION, SOLUTION INTRAVENOUS at 06:05

## 2025-02-07 RX ADMIN — LIDOCAINE HYDROCHLORIDE 60 MG: 20 INJECTION, SOLUTION EPIDURAL; INFILTRATION; INTRACAUDAL; PERINEURAL at 07:21

## 2025-02-07 RX ADMIN — CLINDAMYCIN PHOSPHATE 900 MG: 900 INJECTION, SOLUTION INTRAVENOUS at 07:10

## 2025-02-07 RX ADMIN — PHENYLEPHRINE HYDROCHLORIDE 100 MCG: 10 INJECTION INTRAVENOUS at 07:53

## 2025-02-07 RX ADMIN — FAMOTIDINE 20 MG: 10 INJECTION INTRAVENOUS at 06:26

## 2025-02-07 RX ADMIN — PHENYLEPHRINE HYDROCHLORIDE 100 MCG: 10 INJECTION INTRAVENOUS at 08:41

## 2025-02-07 RX ADMIN — PHENYLEPHRINE HYDROCHLORIDE 100 MCG: 10 INJECTION INTRAVENOUS at 08:05

## 2025-02-07 RX ADMIN — PROPOFOL 50 MG: 10 INJECTION, EMULSION INTRAVENOUS at 07:21

## 2025-02-07 RX ADMIN — MIDAZOLAM 1 MG: 1 INJECTION INTRAMUSCULAR; INTRAVENOUS at 06:29

## 2025-02-07 RX ADMIN — PROPOFOL 100 MCG/KG/MIN: 10 INJECTION, EMULSION INTRAVENOUS at 07:22

## 2025-02-07 RX ADMIN — ROPIVACAINE HYDROCHLORIDE 20 ML: 5 INJECTION EPIDURAL; INFILTRATION; PERINEURAL at 06:25

## 2025-02-07 NOTE — ANESTHESIA POSTPROCEDURE EVALUATION
Patient: Angella Santos    Procedure Summary       Date: 02/07/25 Room / Location:  [facility] [address] [place] [address] [facility]    Anesthesia Start: 0715 Anesthesia Stop: 0859    Procedure: RIGHT  DISTAL RADIUS  OPEN REDUCTION INTERNAL FIXATION (Right: Wrist) Diagnosis:     Surgeons: Gaston Larry MD Provider: Kwan Pierre MD    Anesthesia Type: MAC ASA Status: 2            Anesthesia Type: MAC    Vitals  Vitals Value Taken Time   /74 02/07/25 0930   Temp 36.4 °C (97.6 °F) 02/07/25 0900   Pulse 62 02/07/25 0932   Resp 18 02/07/25 0905   SpO2 99 % 02/07/25 0932   Vitals shown include unfiled device data.        Post Anesthesia Care and Evaluation    Patient location during evaluation: PACU  Patient participation: complete - patient participated  Level of consciousness: awake  Pain management: adequate    Airway patency: patent  Anesthetic complications: No anesthetic complications  PONV Status: none  Cardiovascular status: acceptable  Respiratory status: acceptable  Hydration status: acceptable

## 2025-02-07 NOTE — ANESTHESIA PREPROCEDURE EVALUATION
Anesthesia Evaluation     NPO Solid Status: > 8 hours  NPO Liquid Status: > 4 hours           Airway   Mallampati: I  No difficulty expected  Dental      Pulmonary    Cardiovascular   Exercise tolerance: good (4-7 METS)        Neuro/Psych  GI/Hepatic/Renal/Endo    (+) GERD    Musculoskeletal     Abdominal    Substance History      OB/GYN          Other   arthritis,                 Anesthesia Plan    ASA 2     MAC     (Regional for [facility] PSR )  intravenous induction     Anesthetic plan, risks, benefits, and alternatives have been provided, discussed and informed consent has been obtained with: patient.    CODE STATUS:

## 2025-02-07 NOTE — ANESTHESIA PROCEDURE NOTES
Peripheral Block      Patient reassessed immediately prior to procedure    Patient location during procedure: pre-op  Start time: 2/7/2025 6:20 AM  Stop time: 2/7/2025 6:25 AM  Reason for block: procedure for pain, at surgeon's request and post-op pain management  Performed by  Anesthesiologist: Kwan Pierre MD  Preanesthetic Checklist  Completed: patient identified, IV checked, site marked, risks and benefits discussed, surgical consent, monitors and equipment checked, pre-op evaluation and timeout performed  Prep:  Pt Position: supine  Sterile barriers:cap, gloves, sterile barriers, washed/disinfected hands and mask  Prep: ChloraPrep  Patient monitoring: blood pressure monitoring, continuous pulse oximetry and EKG  Procedure    Sedation: yes  Performed under: local infiltration  Guidance:ultrasound guided  Images:still images obtained, printed/placed on chart    Laterality:right  Block Type:supraclavicular  Injection Technique:single-shot  Needle Type:echogenic  Resistance on Injection: none    Medications Used: dexamethasone (DECADRON) injection - Injection   4 mg - 2/7/2025 6:25:00 AM  ropivacaine (NAROPIN) 0.5 % injection - Injection   20 mL - 2/7/2025 6:25:00 AM      Post Assessment  Injection Assessment: negative aspiration for heme, no paresthesia on injection and incremental injection  Patient Tolerance:comfortable throughout block  Complications:no  Additional Notes  USG used to verify needle placement and medication administration     Performed by: Kwan Pierre MD

## 2025-02-07 NOTE — OP NOTE
Orthopaedic & Hand Surgery  Wrist Fracture Operative Report  Dr. Gaston Larry  [phone]      PATIENT NAME: Angella Santos  MRN: [id number]  : 1960 AGE: 64 y.o. GENDER: female  DATE OF OPERATION: 2025  PREOPERATIVE DIAGNOSIS:   Right intraarticular distal radial fracture with three or more fragments  POSTOPERATIVE DIAGNOSIS:   Right intraarticular distal radial fracture with three or more fragments  OPERATION PERFORMED:   Open treatment of intraarticular distal radial fracture with three or more fragments (CPT 07751)  SURGEON: Gaston Larry MD, PhD  ANESTHESIA: Local and Sedation with Block  ASSISTANT: None  ESTIMATED BLOOD LOSS: <5 ml  SPONGE AND NEEDLE COUNT: Correct  INDICATIONS: The patient is as 64 y.o. female, who sustained a displaced distal radius fracture following a fall onto an outstretched hand. Given the unstable and intraarticular pattern, it was recommended that she undergo open reduction internal fixation. The risks of surgery were discussed and included Pain, Bleeding, Infection, Complications of anesthesia, Damage to blood vessels, nerves and other surrounding structures, Stiffness, Scar, Further procedures, Nonunion or malunion, Hardware Failure, and Unforeseen risks of surgery including stroke, heart stoppage or death. No guarantees were made. Following a thorough discussion, questions were solicited and answered to her satisfaction. Verbal and written consent were obtained prior to proceeding with surgery.    COMPONENTS:   [employer] Plate and Screws    PERTINENT FINDINGS:   Following fixation:  There was restoration of anatomic volar tilt and radial inclination   The fracture was stable taking the wrist through a range of motion and without crepitance.   The DRUJ was stable following internal fixation    TOURNIQUET TIME:   45 Minutes    DETAILS OF PROCEDURE:  The patient was met in the preoperative area. The site was marked. The consent and H&P were reviewed. The patient  was then wheeled back to the operative suite and transferred to the operative table with the operative limb extended onto a hand table. The patient submitted to anesthesia. A tourniquet was placed on the operative upper arm. Surgical alcohol was used to thoroughly clean the entire operative extremity. The operative arm was then prepped in the normal sterile fashion, which included Chloroprep and multiple layers of sterile drapes. After a surgical timeout in which the patient's identity, the surgical side/site, planned procedure, and the administration of preoperative antibiotics were confirmed, the limb was exsanguinated with an Esmarch bandage and the tourniquet was inflated.    The standard trans-FCR approach to the volar distal radius was performed. Incision was made in line with the FCR. FCR sheath and subsheath were divided. Care was taken to avoid damage to the palmar cutaneous branch of the median nerve. Floor of the FCR subsheath was entered. Pronator quadratus was identified and reflected off of the radius in an ulnarward fashion. This gave us full exposure to the fracture. We then elevated the brachioradialis off the distal radius fragment to allow for the reduction. Given that this was a subacute fracture, an extensive amount of mobilization was required.  We pronated the radius out of the way, and released the dorsal periosteum.    We selected a plate from the [employer] distal radius set and provisionally fixed it to the radius using nonlocking screws. Following this, a reduction maneuver was then performed with longitudinal traction volar flexion and ulnar translation. This brought together the radial styloid fragment, and with ligamentotaxis reduced the dorsal lunate facet fragment. The fracture was provisionally fixed with wires through the plate. Position was checked in the AP and lateral fluoroscopic views and found to be acceptable. We then placed locking pegs in the distal metaphyseal fragment,  taking 2 mm off of each measured length and position was checked in the AP, lateral and dorsal tangential fluoroscopic views. We were able to confirm that the screws were subchondral and not penetrating the joint. After this, the final fixation was carried out by placing additional locking screws into the shaft fragment. Final position was checked in the AP, lateral fluoroscopic views and found to have restored the distal radius anatomy. Screw and peg positions were acceptable. We then checked the DRUJ and it was found to be stable.    The wound was thoroughly irrigated. Pronator quadratus was repaired. Tourniquet was deflated at 45 min. Bleeding was controlled with a combination of direct pressure and bipolar cautery. The wound was closed in layers.    A sterile dressing and volar resting splint were applied and secured with an ACE bandage. Anesthesia was reversed without complication, the patient was transferred to the [facility] and taken to the recovery room in stable condition. Sponge and needle count were reported to me as correct. There were no immediate complications. Patient tolerated the procedure well.    Post Operative Plan:   Wound & dressing care  Keep the postoperative dressing in place until the first post-op visit  Once the postoperative dressing is removed, the patient may shower with regular soap and water.  No immersion until 1 week following suture removal  Weightbearing & ROM  Weight bearing is limited to no lifting greater than:  1 pound until 2 weeks postop  5 pounds until 6 weeks postop  25 pounds until 12 weeks postop.  Range of motion of the fingers and wrist is unrestricted and may be done as tolerated.  Therapy  A postsurgical follow-up, the patient will be provided with a volar forearm-based wrist brace, and will receive instructions for a home exercise program.  The patient will be encouraged to do unrestricted finger and wrist range of motion exercises at home.  Brace wear  Brace wear  should be full time for the first 2 weeks, except for bathing and ROM exercises.   The brace may be removed anytime for ROM exercises.  After 2 weeks, she should not wear the brace inside the home and only wear the brace when out of the house.  By 4 weeks, the orthosis should be discontinued completely.  Follow-up  Follow-up as scheduled with Dr. Larry in 1-2 weeks. At that time, she will have her wounds checked and range of motion checked.     Gaston Larry MD, PhD  Orthopaedic & Hand Surgery  [facility]  [phone] - [place] [facility]  [phone] - [facility]

## 2025-02-24 RX ORDER — PANTOPRAZOLE SODIUM 20 MG/1
20 TABLET, DELAYED RELEASE ORAL DAILY
Qty: 90 TABLET | Refills: 3 | Status: CANCELLED | OUTPATIENT
Start: 2025-02-24

## 2025-02-25 RX ORDER — PANTOPRAZOLE SODIUM 20 MG/1
20 TABLET, DELAYED RELEASE ORAL DAILY
Qty: 90 TABLET | Refills: 3 | Status: SHIPPED | OUTPATIENT
Start: 2025-02-25

## 2025-05-19 ENCOUNTER — APPOINTMENT (OUTPATIENT)
Dept: CT IMAGING | Facility: HOSPITAL | Age: 65
DRG: 872 | End: 2025-05-19
Payer: COMMERCIAL

## 2025-05-19 ENCOUNTER — HOSPITAL ENCOUNTER (INPATIENT)
Facility: HOSPITAL | Age: 65
LOS: 7 days | Discharge: HOME | DRG: 872 | End: 2025-05-26
Admitting: STUDENT IN AN ORGANIZED HEALTH CARE EDUCATION/TRAINING PROGRAM
Payer: MEDICARE

## 2025-05-19 DIAGNOSIS — R74.8 ELEVATED LIVER ENZYMES: ICD-10-CM

## 2025-05-19 DIAGNOSIS — R51.9 ACUTE NONINTRACTABLE HEADACHE, UNSPECIFIED HEADACHE TYPE: ICD-10-CM

## 2025-05-19 DIAGNOSIS — D72.818: Primary | ICD-10-CM

## 2025-05-19 DIAGNOSIS — D69.6 THROMBOCYTOPENIA: ICD-10-CM

## 2025-05-19 PROBLEM — D72.819 LEUKOPENIA: Status: ACTIVE | Noted: 2025-05-19

## 2025-05-19 LAB
ALBUMIN SERPL-MCNC: 4.3 G/DL (ref 3.5–5.2)
ALBUMIN/GLOB SERPL: 1.9 G/DL
ALP SERPL-CCNC: 171 U/L (ref 39–117)
ALT SERPL W P-5'-P-CCNC: 132 U/L (ref 1–33)
ANION GAP SERPL CALCULATED.3IONS-SCNC: 9.2 MMOL/L (ref 5–15)
AST SERPL-CCNC: 140 U/L (ref 1–32)
BASOPHILS # BLD AUTO: 0.01 10*3/MM3 (ref 0–0.2)
BASOPHILS NFR BLD AUTO: 0.6 % (ref 0–1.5)
BILIRUB SERPL-MCNC: 0.5 MG/DL (ref 0–1.2)
BUN SERPL-MCNC: 16 MG/DL (ref 8–23)
BUN/CREAT SERPL: 16.2 (ref 7–25)
CALCIUM SPEC-SCNC: 9.2 MG/DL (ref 8.6–10.5)
CHLORIDE SERPL-SCNC: 99 MMOL/L (ref 98–107)
CO2 SERPL-SCNC: 23.8 MMOL/L (ref 22–29)
CREAT SERPL-MCNC: 0.99 MG/DL (ref 0.57–1)
DEPRECATED RDW RBC AUTO: 43.1 FL (ref 37–54)
EGFRCR SERPLBLD CKD-EPI 2021: 63.4 ML/MIN/1.73
EOSINOPHIL # BLD AUTO: 0 10*3/MM3 (ref 0–0.4)
EOSINOPHIL NFR BLD AUTO: 0 % (ref 0.3–6.2)
ERYTHROCYTE [DISTWIDTH] IN BLOOD BY AUTOMATED COUNT: 12.4 % (ref 12.3–15.4)
GLOBULIN UR ELPH-MCNC: 2.3 GM/DL
GLUCOSE SERPL-MCNC: 164 MG/DL (ref 65–99)
HCT VFR BLD AUTO: 41.6 % (ref 34–46.6)
HGB BLD-MCNC: 13.6 G/DL (ref 12–15.9)
IMM GRANULOCYTES # BLD AUTO: 0.01 10*3/MM3 (ref 0–0.05)
IMM GRANULOCYTES NFR BLD AUTO: 0.6 % (ref 0–0.5)
LYMPHOCYTES # BLD AUTO: 0.18 10*3/MM3 (ref 0.7–3.1)
LYMPHOCYTES NFR BLD AUTO: 11 % (ref 19.6–45.3)
MAGNESIUM SERPL-MCNC: 2 MG/DL (ref 1.6–2.4)
MCH RBC QN AUTO: 30.7 PG (ref 26.6–33)
MCHC RBC AUTO-ENTMCNC: 32.7 G/DL (ref 31.5–35.7)
MCV RBC AUTO: 93.9 FL (ref 79–97)
MONOCYTES # BLD AUTO: 0.1 10*3/MM3 (ref 0.1–0.9)
MONOCYTES NFR BLD AUTO: 6.1 % (ref 5–12)
NEUTROPHILS NFR BLD AUTO: 1.34 10*3/MM3 (ref 1.7–7)
NEUTROPHILS NFR BLD AUTO: 81.7 % (ref 42.7–76)
NRBC BLD AUTO-RTO: 0 /100 WBC (ref 0–0.2)
PHOSPHATE SERPL-MCNC: 2.5 MG/DL (ref 2.5–4.5)
PLAT MORPH BLD: NORMAL
PLATELET # BLD AUTO: 97 10*3/MM3 (ref 140–450)
PMV BLD AUTO: 10.9 FL (ref 6–12)
POTASSIUM SERPL-SCNC: 4.4 MMOL/L (ref 3.5–5.2)
PROT SERPL-MCNC: 6.6 G/DL (ref 6–8.5)
RBC # BLD AUTO: 4.43 10*6/MM3 (ref 3.77–5.28)
RBC MORPH BLD: NORMAL
SODIUM SERPL-SCNC: 132 MMOL/L (ref 136–145)
WBC MORPH BLD: NORMAL
WBC NRBC COR # BLD AUTO: 1.64 10*3/MM3 (ref 3.4–10.8)

## 2025-05-19 PROCEDURE — 84100 ASSAY OF PHOSPHORUS: CPT

## 2025-05-19 PROCEDURE — 87484 EHRLICHA CHAFFEENSIS AMP PRB: CPT | Performed by: NURSE PRACTITIONER

## 2025-05-19 PROCEDURE — 85025 COMPLETE CBC W/AUTO DIFF WBC: CPT | Performed by: NURSE PRACTITIONER

## 2025-05-19 PROCEDURE — 85007 BL SMEAR W/DIFF WBC COUNT: CPT | Performed by: NURSE PRACTITIONER

## 2025-05-19 PROCEDURE — 85025 COMPLETE CBC W/AUTO DIFF WBC: CPT

## 2025-05-19 PROCEDURE — 25010000002 ONDANSETRON PER 1 MG: Performed by: NURSE PRACTITIONER

## 2025-05-19 PROCEDURE — 83735 ASSAY OF MAGNESIUM: CPT

## 2025-05-19 PROCEDURE — 70450 CT HEAD/BRAIN W/O DYE: CPT

## 2025-05-19 PROCEDURE — 85007 BL SMEAR W/DIFF WBC COUNT: CPT

## 2025-05-19 PROCEDURE — 99285 EMERGENCY DEPT VISIT HI MDM: CPT

## 2025-05-19 PROCEDURE — 87798 DETECT AGENT NOS DNA AMP: CPT | Performed by: NURSE PRACTITIONER

## 2025-05-19 PROCEDURE — 80053 COMPREHEN METABOLIC PANEL: CPT | Performed by: NURSE PRACTITIONER

## 2025-05-19 PROCEDURE — 80053 COMPREHEN METABOLIC PANEL: CPT

## 2025-05-19 PROCEDURE — 25810000003 SODIUM CHLORIDE 0.9 % SOLUTION: Performed by: NURSE PRACTITIONER

## 2025-05-19 PROCEDURE — 87468 ANAPLSMA PHGCYTOPHLM AMP PRB: CPT | Performed by: NURSE PRACTITIONER

## 2025-05-19 PROCEDURE — 36415 COLL VENOUS BLD VENIPUNCTURE: CPT

## 2025-05-19 PROCEDURE — 86618 LYME DISEASE ANTIBODY: CPT | Performed by: NURSE PRACTITIONER

## 2025-05-19 RX ORDER — ONDANSETRON 4 MG/1
4 TABLET, ORALLY DISINTEGRATING ORAL EVERY 6 HOURS PRN
Status: DISCONTINUED | OUTPATIENT
Start: 2025-05-19 | End: 2025-05-26 | Stop reason: HOSPADM

## 2025-05-19 RX ORDER — ONDANSETRON 2 MG/ML
4 INJECTION INTRAMUSCULAR; INTRAVENOUS ONCE
Status: COMPLETED | OUTPATIENT
Start: 2025-05-19 | End: 2025-05-19

## 2025-05-19 RX ORDER — ACETAMINOPHEN 325 MG/1
650 TABLET ORAL EVERY 4 HOURS PRN
Status: DISCONTINUED | OUTPATIENT
Start: 2025-05-19 | End: 2025-05-19

## 2025-05-19 RX ORDER — ONDANSETRON 2 MG/ML
4 INJECTION INTRAMUSCULAR; INTRAVENOUS EVERY 6 HOURS PRN
Status: DISCONTINUED | OUTPATIENT
Start: 2025-05-19 | End: 2025-05-26 | Stop reason: HOSPADM

## 2025-05-19 RX ORDER — ACETAMINOPHEN 160 MG/5ML
650 SOLUTION ORAL EVERY 4 HOURS PRN
Status: DISCONTINUED | OUTPATIENT
Start: 2025-05-19 | End: 2025-05-19

## 2025-05-19 RX ORDER — SODIUM CHLORIDE 0.9 % (FLUSH) 0.9 %
10 SYRINGE (ML) INJECTION AS NEEDED
Status: DISCONTINUED | OUTPATIENT
Start: 2025-05-19 | End: 2025-05-26 | Stop reason: HOSPADM

## 2025-05-19 RX ORDER — ACETAMINOPHEN 650 MG/1
650 SUPPOSITORY RECTAL EVERY 4 HOURS PRN
Status: DISCONTINUED | OUTPATIENT
Start: 2025-05-19 | End: 2025-05-19

## 2025-05-19 RX ORDER — IBUPROFEN 400 MG/1
400 TABLET, FILM COATED ORAL ONCE
Status: COMPLETED | OUTPATIENT
Start: 2025-05-20 | End: 2025-05-19

## 2025-05-19 RX ADMIN — ONDANSETRON 4 MG: 2 INJECTION, SOLUTION INTRAMUSCULAR; INTRAVENOUS at 20:14

## 2025-05-19 RX ADMIN — IBUPROFEN 400 MG: 400 TABLET, FILM COATED ORAL at 23:56

## 2025-05-19 RX ADMIN — SODIUM CHLORIDE 500 ML: 9 INJECTION, SOLUTION INTRAVENOUS at 20:15

## 2025-05-19 NOTE — Clinical Note
Level of Care: Telemetry [5]   Admitting Physician: LILO STYLES [[id number]]   Attending Physician: LILO STYLES [[id number]]

## 2025-05-20 ENCOUNTER — APPOINTMENT (OUTPATIENT)
Dept: MRI IMAGING | Facility: HOSPITAL | Age: 65
DRG: 872 | End: 2025-05-20
Payer: MEDICARE

## 2025-05-20 ENCOUNTER — APPOINTMENT (OUTPATIENT)
Dept: ULTRASOUND IMAGING | Facility: HOSPITAL | Age: 65
DRG: 872 | End: 2025-05-20
Payer: MEDICARE

## 2025-05-20 LAB
ALBUMIN SERPL-MCNC: 4 G/DL (ref 3.5–5.2)
ALBUMIN/GLOB SERPL: 1.7 G/DL
ALP SERPL-CCNC: 181 U/L (ref 39–117)
ALT SERPL W P-5'-P-CCNC: 140 U/L (ref 1–33)
ANION GAP SERPL CALCULATED.3IONS-SCNC: 9.1 MMOL/L (ref 5–15)
APTT PPP: 29.2 SECONDS (ref 22.7–35.4)
AST SERPL-CCNC: 166 U/L (ref 1–32)
BILIRUB SERPL-MCNC: 0.5 MG/DL (ref 0–1.2)
BUN SERPL-MCNC: 13 MG/DL (ref 8–23)
BUN/CREAT SERPL: 14.4 (ref 7–25)
C3 SERPL-MCNC: 128 MG/DL (ref 82–167)
C4 SERPL-MCNC: 34 MG/DL (ref 14–44)
CALCIUM SPEC-SCNC: 9 MG/DL (ref 8.6–10.5)
CHLORIDE SERPL-SCNC: 104 MMOL/L (ref 98–107)
CO2 SERPL-SCNC: 23.9 MMOL/L (ref 22–29)
CREAT SERPL-MCNC: 0.9 MG/DL (ref 0.57–1)
DACRYOCYTES BLD QL SMEAR: ABNORMAL
DEPRECATED RDW RBC AUTO: 42.9 FL (ref 37–54)
EGFRCR SERPLBLD CKD-EPI 2021: 71.1 ML/MIN/1.73
ERYTHROCYTE [DISTWIDTH] IN BLOOD BY AUTOMATED COUNT: 12.3 % (ref 12.3–15.4)
FERRITIN SERPL-MCNC: 385 NG/ML (ref 13–150)
FOLATE SERPL-MCNC: >20 NG/ML (ref 4.78–24.2)
GLOBULIN UR ELPH-MCNC: 2.4 GM/DL
GLUCOSE SERPL-MCNC: 134 MG/DL (ref 65–99)
HAV IGM SERPL QL IA: NORMAL
HBV CORE IGM SERPL QL IA: NORMAL
HBV SURFACE AG SERPL QL IA: NORMAL
HCT VFR BLD AUTO: 40.8 % (ref 34–46.6)
HCV AB SER QL: NORMAL
HCV AB SER QL: NORMAL
HGB BLD-MCNC: 13.6 G/DL (ref 12–15.9)
HIV 1+2 AB+HIV1 P24 AG SERPL QL IA: NORMAL
INR PPP: 1.09 (ref 0.9–1.1)
IRON 24H UR-MRATE: 25 MCG/DL (ref 37–145)
IRON SATN MFR SERPL: 7 % (ref 20–50)
LAB AP CASE REPORT: NORMAL
LYMPHOCYTES # BLD MANUAL: 0.18 10*3/MM3 (ref 0.7–3.1)
MCH RBC QN AUTO: 31.2 PG (ref 26.6–33)
MCHC RBC AUTO-ENTMCNC: 33.3 G/DL (ref 31.5–35.7)
MCV RBC AUTO: 93.6 FL (ref 79–97)
METAMYELOCYTES NFR BLD MANUAL: 10 % (ref 0–0)
NEUTROPHILS # BLD AUTO: 1.19 10*3/MM3 (ref 1.7–7)
NEUTROPHILS NFR BLD MANUAL: 28 % (ref 42.7–76)
NEUTS BAND NFR BLD MANUAL: 50 % (ref 0–5)
PATH REPORT.FINAL DX SPEC: NORMAL
PATHOLOGY REVIEW: YES
PLATELET # BLD AUTO: 84 10*3/MM3 (ref 140–450)
PMV BLD AUTO: 10.9 FL (ref 6–12)
POIKILOCYTOSIS BLD QL SMEAR: ABNORMAL
POTASSIUM SERPL-SCNC: 4.7 MMOL/L (ref 3.5–5.2)
PROT SERPL-MCNC: 6.4 G/DL (ref 6–8.5)
PROTHROMBIN TIME: 14.1 SECONDS (ref 11.7–14.2)
RBC # BLD AUTO: 4.36 10*6/MM3 (ref 3.77–5.28)
SCAN SLIDE: NORMAL
SMALL PLATELETS BLD QL SMEAR: ABNORMAL
SODIUM SERPL-SCNC: 137 MMOL/L (ref 136–145)
TIBC SERPL-MCNC: 368 MCG/DL (ref 298–536)
TRANSFERRIN SERPL-MCNC: 247 MG/DL (ref 200–360)
VARIANT LYMPHS NFR BLD MANUAL: 4 % (ref 0–5)
VARIANT LYMPHS NFR BLD MANUAL: 8 % (ref 19.6–45.3)
VIT B12 BLD-MCNC: 1443 PG/ML (ref 211–946)
WBC MORPH BLD: NORMAL
WBC NRBC COR # BLD AUTO: 1.53 10*3/MM3 (ref 3.4–10.8)

## 2025-05-20 PROCEDURE — 86803 HEPATITIS C AB TEST: CPT | Performed by: INTERNAL MEDICINE

## 2025-05-20 PROCEDURE — 84466 ASSAY OF TRANSFERRIN: CPT

## 2025-05-20 PROCEDURE — 82728 ASSAY OF FERRITIN: CPT | Performed by: PHYSICIAN ASSISTANT

## 2025-05-20 PROCEDURE — 85730 THROMBOPLASTIN TIME PARTIAL: CPT

## 2025-05-20 PROCEDURE — 76705 ECHO EXAM OF ABDOMEN: CPT

## 2025-05-20 PROCEDURE — 86038 ANTINUCLEAR ANTIBODIES: CPT | Performed by: INTERNAL MEDICINE

## 2025-05-20 PROCEDURE — 70553 MRI BRAIN STEM W/O & W/DYE: CPT

## 2025-05-20 PROCEDURE — 82746 ASSAY OF FOLIC ACID SERUM: CPT | Performed by: INTERNAL MEDICINE

## 2025-05-20 PROCEDURE — 25010000002 GADOTERIDOL PER 1 ML: Performed by: HOSPITALIST

## 2025-05-20 PROCEDURE — 99221 1ST HOSP IP/OBS SF/LOW 40: CPT | Performed by: INTERNAL MEDICINE

## 2025-05-20 PROCEDURE — 80074 ACUTE HEPATITIS PANEL: CPT

## 2025-05-20 PROCEDURE — G0432 EIA HIV-1/HIV-2 SCREEN: HCPCS

## 2025-05-20 PROCEDURE — 25810000003 SODIUM CHLORIDE 0.9 % SOLUTION

## 2025-05-20 PROCEDURE — 88184 FLOWCYTOMETRY/ TC 1 MARKER: CPT

## 2025-05-20 PROCEDURE — 99221 1ST HOSP IP/OBS SF/LOW 40: CPT | Performed by: PSYCHIATRY & NEUROLOGY

## 2025-05-20 PROCEDURE — 88185 FLOWCYTOMETRY/TC ADD-ON: CPT | Performed by: INTERNAL MEDICINE

## 2025-05-20 PROCEDURE — 86160 COMPLEMENT ANTIGEN: CPT | Performed by: INTERNAL MEDICINE

## 2025-05-20 PROCEDURE — 82607 VITAMIN B-12: CPT

## 2025-05-20 PROCEDURE — 85610 PROTHROMBIN TIME: CPT

## 2025-05-20 PROCEDURE — A9579 GAD-BASE MR CONTRAST NOS,1ML: HCPCS | Performed by: HOSPITALIST

## 2025-05-20 PROCEDURE — 83540 ASSAY OF IRON: CPT

## 2025-05-20 RX ORDER — IBUPROFEN 400 MG/1
400 TABLET, FILM COATED ORAL EVERY 6 HOURS PRN
Status: DISCONTINUED | OUTPATIENT
Start: 2025-05-20 | End: 2025-05-21

## 2025-05-20 RX ORDER — BACLOFEN 10 MG/1
10 TABLET ORAL EVERY 8 HOURS PRN
Status: DISCONTINUED | OUTPATIENT
Start: 2025-05-20 | End: 2025-05-26 | Stop reason: HOSPADM

## 2025-05-20 RX ORDER — IBUPROFEN 400 MG/1
400 TABLET, FILM COATED ORAL ONCE AS NEEDED
Status: COMPLETED | OUTPATIENT
Start: 2025-05-20 | End: 2025-05-20

## 2025-05-20 RX ORDER — BUTALBITAL, ACETAMINOPHEN AND CAFFEINE 50; 325; 40 MG/1; MG/1; MG/1
2 TABLET ORAL EVERY 8 HOURS
Status: DISCONTINUED | OUTPATIENT
Start: 2025-05-20 | End: 2025-05-22

## 2025-05-20 RX ORDER — SENNOSIDES 8.6 MG/1
2 TABLET ORAL NIGHTLY PRN
Status: DISCONTINUED | OUTPATIENT
Start: 2025-05-20 | End: 2025-05-26 | Stop reason: HOSPADM

## 2025-05-20 RX ORDER — SODIUM CHLORIDE 9 MG/ML
75 INJECTION, SOLUTION INTRAVENOUS CONTINUOUS
Status: DISCONTINUED | OUTPATIENT
Start: 2025-05-20 | End: 2025-05-20

## 2025-05-20 RX ORDER — PANTOPRAZOLE SODIUM 40 MG/1
40 TABLET, DELAYED RELEASE ORAL
Status: DISCONTINUED | OUTPATIENT
Start: 2025-05-20 | End: 2025-05-26 | Stop reason: HOSPADM

## 2025-05-20 RX ORDER — GABAPENTIN 300 MG/1
300 CAPSULE ORAL ONCE
Status: COMPLETED | OUTPATIENT
Start: 2025-05-20 | End: 2025-05-20

## 2025-05-20 RX ADMIN — SODIUM CHLORIDE 75 ML/HR: 9 INJECTION, SOLUTION INTRAVENOUS at 01:30

## 2025-05-20 RX ADMIN — BUTALBITAL, ACETAMINOPHEN, AND CAFFEINE 2 TABLET: 325; 50; 40 TABLET ORAL at 17:39

## 2025-05-20 RX ADMIN — PANTOPRAZOLE SODIUM 40 MG: 40 TABLET, DELAYED RELEASE ORAL at 05:37

## 2025-05-20 RX ADMIN — GADOTERIDOL 13 ML: 279.3 INJECTION, SOLUTION INTRAVENOUS at 16:22

## 2025-05-20 RX ADMIN — GABAPENTIN 300 MG: 300 CAPSULE ORAL at 14:15

## 2025-05-20 RX ADMIN — IBUPROFEN 400 MG: 400 TABLET, FILM COATED ORAL at 07:42

## 2025-05-20 RX ADMIN — BACLOFEN 10 MG: 10 TABLET ORAL at 14:15

## 2025-05-20 NOTE — H&P
[facility] Medicine Services  History & Physical    Patient Name: Angella Santos  : 1960  MRN: [id number]  Primary Care Physician:  Nikhil Maloney MD  Date of admission: 2025  Date and Time of Service: 2025 at 2222    Subjective      Chief Complaint: headache    History of Present Illness: Angella Santos is a 65 y.o. female with a CMH of GERD, osteoporosis, h/o kidney stones who presented to [facility] on 2025 with headache that began yesterday. Reports it began behind her right ear, and has slowly progressed to the left temporal region.  Describes it as a sharp stabbing pain, that comes and goes.  States that she took Tylenol yesterday, and it helped temporarily.  Reports she has had chills, weakness, decreased appetite, nausea since yesterday.  Denies fevers, chest pain, shortness of breath, abdominal pain, vomiting.    In the ED, patient was afebrile and hemodynamically stable.  Labs pertinent for sodium 132, alk phos 171, , .  WBC 1.64, plts 97.  CT head without: negative for acute findings.  Given Zofran, IV fluids.  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  Hospital service to admit for continued evaluation.      Review of Systems   Constitutional:  Positive for appetite change, chills and fever.   Respiratory:  Negative for shortness of breath.    Gastrointestinal:  Positive for nausea. Negative for abdominal pain and vomiting.   Neurological:  Negative for weakness.       Personal History     Past Medical History:   Diagnosis Date   • Degenerative joint disease of knee, left    • Fracture of right distal radius 2025    FELL ON ICE:  RIGHT LOWER ARM CAST, PAIN, LIMITED MOBILITY. SL SWELLING NOTED IN RIGHT FINGERS   • GERD (gastroesophageal reflux disease)    • History of kidney stones    • Osteoporosis        Past Surgical History:   Procedure Laterality Date   • COLONOSCOPY       Normal   • KIDNEY STONE SURGERY  2012    LITHOTRIPSY STENT    • ORIF WRIST FRACTURE Right 2/7/2025    Procedure: RIGHT  DISTAL RADIUS  OPEN REDUCTION INTERNAL FIXATION;  Surgeon: Gaston Larry MD;  [place] [facility] [place] [facility] [place]  Service: Orthopedics;  Laterality: Right;       Family History: family history includes Alcohol abuse in her father; Arthritis in her mother; Breast cancer in her mother; Cancer in her mother and another family member; Diabetes in her mother and another family member; Early death in her father; Heart disease in her mother; Hyperlipidemia in an other family member; Hypertension in her father and another family member; Kidney disease in her sister. Otherwise pertinent FHx was reviewed and not pertinent to current issue.    Social History:  reports that she has never smoked. She has never used smokeless tobacco. She reports current alcohol use of about 3.0 standard drinks of alcohol per week. She reports that she does not use drugs.    Home Medications:  Prior to Admission Medications       Prescriptions Last Dose Informant Patient Reported? Taking?    acetaminophen (TYLENOL) 500 MG tablet  Self Yes No    Take 2 tablets by mouth Every 6 (Six) Hours As Needed for Mild Pain.    Calcium Citrate-Vitamin D3 (CITRACAL) 315-6.25 MG-MCG tablet tablet  Self Yes No    Take 2 tablets by mouth Daily. Patient stated she is taking calcium with vit d not just calcium    clobetasol propionate (TEMOVATE) 0.05 % cream  Self No No    Apply to external vulva 1-2 times weekly as needed.    Patient taking differently:  Apply 1 Application topically to the appropriate area as directed As Needed.    Cyanocobalamin (B-12) 1000 MCG tablet  Self Yes No    Take 1 tablet by mouth Daily.    estradiol (ESTRACE) 0.1 MG/GM vaginal cream  Self No No    Use fingertip amount to vagina nightly for 2 weeks then 1-2 times weekly.    HYDROcodone-acetaminophen (NORCO) 5-325 MG per tablet   No No    Take 1 tablet by mouth Every 4  (Four) Hours As Needed for Severe Pain.    Magnesium 250 MG tablet  Self Yes No    Take 2 tablets by mouth Every Night.    multivitamin with minerals tablet tablet  Self Yes No    Take 1 tablet by mouth Daily.    pantoprazole (Protonix) 20 MG EC tablet   No No    Take 1 tablet by mouth Daily.    Turmeric (QC TUMERIC COMPLEX PO)  Self Yes No    Take 1 tablet by mouth Daily. CHEWABLE  HOLD FOR SURGERY    zoledronic acid (Reclast) 5 MG/100ML solution infusion   Yes No    100 mL 1 (One) Time. YEARLY  OSTEOPROSIS              Allergies:  Allergies   Allergen Reactions   • Cephalexin Hives       Objective      Vitals:   Temp:  [98 °F (36.7 °C)] 98 °F (36.7 °C)  Heart Rate:  [79-87] 80  Resp:  [16-17] 17  BP: (122-135)/(74-91) 122/91  Body mass index is 20.76 kg/m².  Physical Exam  General: 66 yo WF, Alert and oriented, well nourished, no acute distress.  HENT: Normocephalic, normal hearing, moist oral mucosa, no scleral icterus.  Neck: Supple, nontender, no carotid bruits, no JVD, no LAD.  Lungs: Clear to auscultation, nonlabored respiration.  Heart: RRR, no murmur, gallop or edema.  Abdomen: Soft, nontender, nondistended, + bowel sounds.  Musculoskeletal: Normal range of motion and strength, no tenderness or swelling.  Skin: Skin is warm, dry and pink, no rashes or lesions.  Psychiatric: Cooperative, appropriate mood and affect.      Diagnostic Data:  Lab Results (last 24 hours)       Procedure Component Value Units Date/Time    Rickettsia Species DNA, Real-Time PCR [[id number]] Collected: 05/19/25 2210    Specimen: Blood from Arm, Right Updated: 05/19/25 2215    Ehrlichia Profile DNA PCR [[id number]] Collected: 05/19/25 2210    Specimen: Blood from Arm, Right Updated: 05/19/25 2215    Lyme Antibody Total with Reflex (ZAYNAB) [[id number]] Collected: 05/19/25 2210    Specimen: Blood from Arm, Right Updated: 05/19/25 2215    Comprehensive Metabolic Panel [[id number]]  (Abnormal) Collected: 05/19/25 2016    Specimen: Blood from  Arm, Right Updated: 05/19/25 2042     Glucose 164 mg/dL      BUN 16 mg/dL      Creatinine 0.99 mg/dL      Sodium 132 mmol/L      Potassium 4.4 mmol/L      Chloride 99 mmol/L      CO2 23.8 mmol/L      Calcium 9.2 mg/dL      Total Protein 6.6 g/dL      Albumin 4.3 g/dL      ALT (SGPT) 132 U/L      AST (SGOT) 140 U/L      Alkaline Phosphatase 171 U/L      Total Bilirubin 0.5 mg/dL      Globulin 2.3 gm/dL      A/G Ratio 1.9 g/dL      BUN/Creatinine Ratio 16.2     Anion Gap 9.2 mmol/L      eGFR 63.4 mL/min/1.73     Narrative:      GFR Categories in Chronic Kidney Disease (CKD)              GFR Category          GFR (mL/min/1.73)    Interpretation  G1                    90 or greater        Normal or high (1)  G2                    60-89                Mild decrease (1)  G3a                   45-59                Mild to moderate decrease  G3b                   30-44                Moderate to severe decrease  G4                    15-29                Severe decrease  G5                    14 or less           Kidney failure    (1)In the absence of evidence of kidney disease, neither GFR category G1 or G2 fulfill the criteria for CKD.    eGFR calculation 2021 CKD-EPI creatinine equation, which does not include race as a factor    CBC & Differential [[id number]]  (Abnormal) Collected: 05/19/25 2016    Specimen: Blood from Arm, Right Updated: 05/19/25 2037    Narrative:      The following orders were created for panel order CBC & Differential.  Procedure                               Abnormality         Status                     ---------                               -----------         ------                     CBC Auto Differential[[id number]]        Abnormal            Final result               Scan Slide[[id number]]                   Normal              Final result                 Please view results for these tests on the individual orders.    CBC Auto Differential [[id number]]  (Abnormal) Collected: 05/19/25 2016     Specimen: Blood from Arm, Right Updated: 05/19/25 2037     WBC 1.64 10*3/mm3      RBC 4.43 10*6/mm3      Hemoglobin 13.6 g/dL      Hematocrit 41.6 %      MCV 93.9 fL      MCH 30.7 pg      MCHC 32.7 g/dL      RDW 12.4 %      RDW-SD 43.1 fl      MPV 10.9 fL      Platelets 97 10*3/mm3      Neutrophil % 81.7 %      Lymphocyte % 11.0 %      Monocyte % 6.1 %      Eosinophil % 0.0 %      Basophil % 0.6 %      Immature Grans % 0.6 %      Neutrophils, Absolute 1.34 10*3/mm3      Lymphocytes, Absolute 0.18 10*3/mm3      Monocytes, Absolute 0.10 10*3/mm3      Eosinophils, Absolute 0.00 10*3/mm3      Basophils, Absolute 0.01 10*3/mm3      Immature Grans, Absolute 0.01 10*3/mm3      nRBC 0.0 /100 WBC     Scan Slide [[id number]]  (Normal) Collected: 05/19/25 2016    Specimen: Blood from Arm, Right Updated: 05/19/25 2037     RBC Morphology Normal     WBC Morphology Normal     Platelet Morphology Normal    Narrative:      Slide Reviewed               Imaging Results (Last 24 Hours)       Procedure Component Value Units Date/Time    CT Head Without Contrast [[id number]] Collected: 05/19/25 2110     Updated: 05/19/25 2113    Narrative:      CT HEAD WO CONTRAST    Date of Exam: 5/19/2025 8:34 PM EDT    Indication: headache.    Comparison: None    Technique: Axial CT images were obtained of the head without contrast administration.  Coronal reconstructions were performed.  Automated exposure control and iterative reconstruction methods were used.      Findings:  No large territory infarct.    There is no evidence of hemorrhage.  No mass effect, edema or midline shift    Unremarkable white matter    No extra-axial fluid collection.    The ventricles are normal in size and configuration.    The visualized orbits are unremarkable.  The visualized paranasal sinuses and mastoid air cells are clear.    The visualized soft tissues are unremarkable.  No acute osseous abnormality.      Impression:      Impression:  No acute intracranial  abnormality.        Electronically Signed: Pieter Palm,     5/19/2025 9:11 PM EDT    Workstation ID: [id number]              Assessment & Plan        This is a 65 y.o. female with:    Active and Resolved Problems  Active Hospital Problems    Diagnosis  POA   • **Leukopenia [D72.819]  Yes      Resolved Hospital Problems   No resolved problems to display.       Eosinophilic leukopenia  Thrombocytopenia  WBC 1.64, plts 97.  ED provider consulted heme-onc, spoke with Dr. George - requesting admission for further workup  CBC with peripheral smear pending  B12, HIV, Hep C pending  PTT, PT/INR pending  US liver/spleen pending  Isolation, negative pressure room  Heme-onc following    Acute nonintractable headache  CT head: Negative for acute findings  Pain meds, antiemetics as needed  Continue to monitor    Hyponatremia  Sodium 132  IVFs overnight  Continue to monitor, am labs ordered    Elevated liver enzymes  Alk phos 171, , .    Hepatitis panel, iron studies pending  US liver pending   Continue to monitor, am labs orders  Hold/avoid hepatotoxic medications        VTE Prophylaxis:  Mechanical VTE prophylaxis orders are present.        The patient desires to be as follows:    CODE STATUS:           Ulices Tom, who can be contacted at [phone], is the designated person to make medical decisions on the patient's behalf if She is incapable of doing so. This was clarified with patient and/or next of kin on 5/19/2025 during the course of this H&P.    Admission Status:  I believe this patient meets inpatient status.    Expected Length of Stay: 2-3 days    PDMP and Medication Dispenses via Sidebar reviewed and consistent with patient reported medications.    I discussed the patient's findings and my recommendations with patient.      Signature:     This document has been electronically signed by Noemi Ramey PA-C on May 19, 2025 22:22 EDT   [facility] Hospitalist Team

## 2025-05-20 NOTE — PROGRESS NOTES
[facility] MEDICINE SERVICE  DAILY PROGRESS NOTE    NAME: Angella Santos  : 1960  MRN: [id number]      LOS: 1 day     PROVIDER OF SERVICE: Timothy Duane Brammell, MD    Chief Complaint: Leukopenia    Subjective:     Interval History:  History taken from: patient    Patient with vague shooting type sensations of the head discomfort at times.  Denies any arthralgias.  Denies any rashes.  Does report a attack to take several days ago.  No specific fevers.  Somewhat poor appetite.  No GI symptomatology.  Denies any other additional acute issues.        Review of Systems:   Review of Systems   All other systems reviewed and are negative.      Objective:     Vital Signs  Temp:  [98 °F (36.7 °C)-99.6 °F (37.6 °C)] 99.6 °F (37.6 °C)  Heart Rate:  [73-87] 73  Resp:  [12-17] 14  BP: (103-142)/(61-91) 103/73   Body mass index is 20.76 kg/m².    Physical Exam  Physical Exam  Vitals reviewed.   Constitutional:       General: She is not in acute distress.  HENT:      Head: Normocephalic.   Cardiovascular:      Rate and Rhythm: Normal rate and regular rhythm.   Pulmonary:      Effort: Pulmonary effort is normal.      Breath sounds: Normal breath sounds.   Abdominal:      General: Bowel sounds are normal.      Palpations: Abdomen is soft.      Tenderness: There is no abdominal tenderness.   Musculoskeletal:         General: No swelling.   Neurological:      General: No focal deficit present.      Mental Status: She is alert and oriented to person, place, and time. Mental status is at baseline.   Psychiatric:         Behavior: Behavior normal.            Diagnostic Data    Results from last 7 days   Lab Units 25  2353   WBC 10*3/mm3 1.53*   HEMOGLOBIN g/dL 13.6   HEMATOCRIT % 40.8   PLATELETS 10*3/mm3 84*   GLUCOSE mg/dL 134*   CREATININE mg/dL 0.90   BUN mg/dL 13   SODIUM mmol/L 137   POTASSIUM mmol/L 4.7   AST (SGOT) U/L 166*   ALT (SGPT) U/L 140*   ALK PHOS U/L 181*   BILIRUBIN mg/dL 0.5   ANION GAP mmol/L 9.1        US Abdomen Limited  Result Date: 5/20/2025  Impression: 1. Mildly increased hepatic echogenicity may relate to underlying hepatic steatosis. 2. Spleen size normal with calculated splenic volume of 358 mL. 3. No biliary dilatation. Electronically Signed: Thomas Chin MD  5/20/2025 7:52 AM EDT  Workstation ID: [id number]    CT Head Without Contrast  Result Date: 5/19/2025  Impression: No acute intracranial abnormality. Electronically Signed: Pieter Palm DO  5/19/2025 9:11 PM EDT  Workstation ID: [id number]            Assessment:      Neutropenia  Thrombocytopenia   History of tick bite   head pain  Transaminitis       Plan: MRI of the head ordered by neurology.  Hematology reviewing with pending studies.  Symptomatic treatment of head discomfort.        Active and Resolved Problems  Active Hospital Problems    Diagnosis  POA    **Leukopenia [D72.819]  Yes      Resolved Hospital Problems   No resolved problems to display.           VTE Prophylaxis:  Mechanical VTE prophylaxis orders are present.             Disposition Planning:     Barriers to Discharge:white blood count and platelet count/headache  Anticipated Date of Discharge: 5/22  Place of Discharge: home      Time: 30 minutes     Code Status and Medical Interventions: CPR (Attempt to Resuscitate); Limited Support; No artificial nutrition   Ordered at: 05/20/25 0116     Code Status (Patient has no pulse and is not breathing):    CPR (Attempt to Resuscitate)     Medical Interventions (Patient has pulse or is breathing):    Limited Support     Medical Intervention Limits:    No artificial nutrition       Signature: Electronically signed by Timothy Duane Brammell, MD, 05/20/25, 15:13 EDT.  [facility] Hospitalist Team

## 2025-05-20 NOTE — CASE MANAGEMENT/SOCIAL WORK
Discharge Planning Assessment   Jermain     Patient Name: Angella Santos  MRN: [id number]  Today's Date: 5/20/2025    Admit Date: 5/19/2025    Plan: Routine home with spouse.   Discharge Needs Assessment       Row Name 05/20/25 1037       Living Environment    People in Home spouse    Name(s) of People in Home Deric    Current Living Arrangements home    Potentially Unsafe Housing Conditions none    In the past 12 months has the electric, gas, oil, or water company threatened to shut off services in your home? No    Primary Care Provided by self    Provides Primary Care For no one    Family Caregiver if Needed spouse    Family Caregiver Names Deric    Quality of Family Relationships helpful;involved;supportive    Able to Return to Prior Arrangements yes       Resource/Environmental Concerns    Resource/Environmental Concerns none    Transportation Concerns none       Transportation Needs    In the past 12 months, has lack of transportation kept you from medical appointments or from getting medications? no    In the past 12 months, has lack of transportation kept you from meetings, work, or from getting things needed for daily living? No       Food Insecurity    Within the past 12 months, you worried that your food would run out before you got the money to buy more. Never true    Within the past 12 months, the food you bought just didn't last and you didn't have money to get more. Never true       Transition Planning    Patient/Family Anticipates Transition to home with family    Patient/Family Anticipated Services at Transition none    Transportation Anticipated family or friend will provide       Discharge Needs Assessment    Readmission Within the Last 30 Days no previous admission in last 30 days    Equipment Currently Used at Home none    Concerns to be Addressed denies needs/concerns at this time    Do you want help finding or keeping work or a job? Patient declined    Do you want help with school or training? For  example, starting or completing job training or getting a high school diploma, GED or equivalent Patient declined    Anticipated Changes Related to Illness none    Equipment Needed After Discharge none                   Discharge Plan       Row Name 05/20/25 1038       Plan    Plan Routine home with spouse.    Patient/Family in Agreement with Plan yes    Plan Comments CM met with patient and spouse Deric at bedside. Confirmed PCP, insurance, and pharmacy.  Meds to beds enrolled. Patient denies any difficulty affording medications. Patient not current with any therapies. Denies any DME. patient is independent with ADL's. Confirmed transportation at discharge will be Deric. D/C Barriers: IVF, path pending, wbc 1.53, hem/onc consult, labs ordered.                    Demographic Summary       Row Name 05/20/25 1037       General Information    Admission Type inpatient    Arrived From emergency department    Referral Source admission list    Reason for Consult discharge planning    Preferred Language English       Contact Information    Permission Granted to Share Info With                    Functional Status       Row Name 05/20/25 1037       Functional Status    Usual Activity Tolerance good    Current Activity Tolerance moderate       Functional Status, IADL    Medications independent    Meal Preparation independent    Housekeeping independent    Laundry independent    Shopping independent    If for any reason you need help with day-to-day activities such as bathing, preparing meals, shopping, managing finances, etc., do you get the help you need? I get all the help I need               Silvia Mehta RN    Office: [phone]

## 2025-05-20 NOTE — ED PROVIDER NOTES
Subjective   History of Present Illness  Patient is a 65-year-old female who comes in with a headache which is abnormal for her she states it started behind her right ear yesterday and today as above and a little bit posterior to her left ear she states that it is a sharp shooting pain that lasts just a few seconds-and is sharp in nature she is also had some nausea associated with that she has had no fever no chills no recent injuries or fall          Review of Systems   Gastrointestinal:  Positive for nausea.   Neurological:  Positive for headaches.       Past Medical History:   Diagnosis Date    Degenerative joint disease of knee, left     Fracture of right distal radius 01/25/2025    FELL ON ICE:  RIGHT LOWER ARM CAST, PAIN, LIMITED MOBILITY. SL SWELLING NOTED IN RIGHT FINGERS    GERD (gastroesophageal reflux disease)     History of kidney stones     Osteoporosis        Allergies   Allergen Reactions    Cephalexin Hives       Past Surgical History:   Procedure Laterality Date    COLONOSCOPY  2019    Normal    KIDNEY STONE SURGERY  2012    LITHOTRIPSY STENT     ORIF WRIST FRACTURE Right 2/7/2025    Procedure: RIGHT  DISTAL RADIUS  OPEN REDUCTION INTERNAL FIXATION;  Surgeon: Gaston Larry MD;  Location: [facility] [place];  Service: Orthopedics;  Laterality: Right;       Family History   Problem Relation Age of Onset    Arthritis Mother     Diabetes Mother     Heart disease Mother     Cancer Mother     Breast cancer Mother     Hypertension Father     Alcohol abuse Father     Early death Father     Kidney disease Sister     Diabetes Other     Hypertension Other     Hyperlipidemia Other     Cancer Other     Malig Hyperthermia Neg Hx        Social History     Socioeconomic History    Marital status:    Tobacco Use    Smoking status: Never    Smokeless tobacco: Never   Vaping Use    Vaping status: Never Used   Substance and Sexual Activity    Alcohol use: Yes     Alcohol/week: 3.0 standard drinks of alcohol      Types: 3 Glasses of wine per week     Comment: WEEKLY    Drug use: Never    Sexual activity: Yes     Partners: Male     Birth control/protection: Post-menopausal           Objective   Physical Exam  Vitals reviewed.   Constitutional:       Appearance: Normal appearance. She is well-developed.   HENT:      Head: Normocephalic and atraumatic.      Right Ear: Tympanic membrane and external ear normal.      Left Ear: Tympanic membrane and external ear normal.      Nose: Nose normal.      Mouth/Throat:      Mouth: Mucous membranes are moist.   Eyes:      Conjunctiva/sclera: Conjunctivae normal.      Pupils: Pupils are equal, round, and reactive to light.   Cardiovascular:      Rate and Rhythm: Normal rate and regular rhythm.      Heart sounds: Normal heart sounds.   Pulmonary:      Effort: Pulmonary effort is normal. No respiratory distress.      Breath sounds: Normal breath sounds. No wheezing.   Abdominal:      General: Bowel sounds are normal.      Palpations: Abdomen is soft.      Tenderness: There is no abdominal tenderness.   Musculoskeletal:         General: No deformity. Normal range of motion.      Cervical back: Normal range of motion and neck supple.   Skin:     General: Skin is warm and dry.      Capillary Refill: Capillary refill takes less than 2 seconds.   Neurological:      General: No focal deficit present.      Mental Status: She is alert and oriented to person, place, and time.      GCS: GCS eye subscore is 4. GCS verbal subscore is 5. GCS motor subscore is 6.      Motor: No weakness.   Psychiatric:         Attention and Perception: Attention normal.         Mood and Affect: Mood normal.         Speech: Speech normal.         Behavior: Behavior normal. Behavior is cooperative.         Thought Content: Thought content normal.         Cognition and Memory: Cognition and memory normal.         Judgment: Judgment normal.         Procedures           ED Course  ED Course as of 05/19/25 2218   Mon May 19,  "2025 2134 Patient discussed with Dr. George with heme-onc who wishes the patient to be admitted for further workup [KW]   2217 Spoke with Noemi who agreed to admit for Dr. Galvan the hospitalist.  [KW]      ED Course User Index  [KW] Magalie Salter, APRN        /91   Pulse 80   Temp 98 °F (36.7 °C) (Oral)   Resp 17   Ht 165.1 cm (65\")   Wt 56.6 kg (124 lb 12.5 oz)   SpO2 96%   BMI 20.76 kg/m²   Labs Reviewed   COMPREHENSIVE METABOLIC PANEL - Abnormal; Notable for the following components:       Result Value    Glucose 164 (*)     Sodium 132 (*)     ALT (SGPT) 132 (*)     AST (SGOT) 140 (*)     Alkaline Phosphatase 171 (*)     All other components within normal limits    Narrative:     GFR Categories in Chronic Kidney Disease (CKD)              GFR Category          GFR (mL/min/1.73)    Interpretation  G1                    90 or greater        Normal or high (1)  G2                    60-89                Mild decrease (1)  G3a                   45-59                Mild to moderate decrease  G3b                   30-44                Moderate to severe decrease  G4                    15-29                Severe decrease  G5                    14 or less           Kidney failure    (1)In the absence of evidence of kidney disease, neither GFR category G1 or G2 fulfill the criteria for CKD.    eGFR calculation 2021 CKD-EPI creatinine equation, which does not include race as a factor   CBC WITH AUTO DIFFERENTIAL - Abnormal; Notable for the following components:    WBC 1.64 (*)     Platelets 97 (*)     Neutrophil % 81.7 (*)     Lymphocyte % 11.0 (*)     Eosinophil % 0.0 (*)     Immature Grans % 0.6 (*)     Neutrophils, Absolute 1.34 (*)     Lymphocytes, Absolute 0.18 (*)     All other components within normal limits   SCAN SLIDE - Normal    Narrative:     Slide Reviewed     EHRLICHIA PROFILE, DNA PCR   RICKETTSIA SPECIES DNA, REAL TIME PCR   LYME ANTIBODY TOTAL WITH REFLEX (ZAYNAB)   CBC AND " DIFFERENTIAL    Narrative:     The following orders were created for panel order CBC & Differential.  Procedure                               Abnormality         Status                     ---------                               -----------         ------                     CBC Auto Differential[[id number]]        Abnormal            Final result               Scan Slide[[id number]]                   Normal              Final result                 Please view results for these tests on the individual orders.     Medications   sodium chloride 0.9 % flush 10 mL (has no administration in time range)   sodium chloride 0.9 % bolus 500 mL (0 mL Intravenous Stopped 5/19/25 2138)   ondansetron (ZOFRAN) injection 4 mg (4 mg Intravenous Given 5/19/25 2014)     CT Head Without Contrast  Result Date: 5/19/2025  Impression: No acute intracranial abnormality. Electronically Signed: Pieter Palm DO  5/19/2025 9:11 PM EDT  Workstation ID: [id number]                                                   Medical Decision Making  Patient is a 65-year-old female who comes in with headache that started yesterday she states is not the worst headache of her life no thunderclap Headache described she had some nausea associated with that she states the headache was sharp shooting in and the pain lasted a few seconds but happen several times.    The patient had IV established and blood work was obtained she was found to have a new leukopenia with a white count of 1.642 years ago and 4 months ago her white count was right around 6 today she is also found to be thrombocytopenic with platelets of 97 4 months ago she was 171 she also has elevated liver enzymes ALT at 132  and alk phos 171 which are significantly increased from 4 months ago when she was ALT 16 AST 24 and alk phos 72-  Labs were discussed and reviewed with Dr. Barr-the patient was placed in a isolation negative pressure room-    The patient had a CT of the head which was  read by the radiologist myself and found to be negative for intercranial abnormality  The patient was discussed with Dr. 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     Details: Reviewed old labs for comparison from 4 months ago  Labs: ordered. Decision-making details documented in ED Course.  Radiology: ordered and independent interpretation performed. Decision-making details documented in ED Course.  ECG/medicine tests: ordered and independent interpretation performed. Decision-making details documented in ED Course.    Risk  OTC drugs.  Prescription drug management.  Decision regarding hospitalization.        Final diagnoses:   Acute nonintractable headache, unspecified headache type   Eosinophilic leukopenia   Thrombocytopenia   Elevated liver enzymes       ED Disposition  ED Disposition       ED Disposition   Decision to Admit    Condition   --    Comment   Level of Care: Telemetry [5]   Admitting Physician: LILO STYLES [[id number]]   Attending Physician: LILO STYLES [[id number]]                 No follow-up provider specified.       Medication List        Changed      clobetasol propionate 0.05 % cream  Commonly known as: TEMOVATE  Apply to external vulva 1-2 times weekly as needed.  What changed:   how much to take  when to take this  reasons to take this                  Magalie Salter, APRN  05/19/25 2213

## 2025-05-20 NOTE — CONSULTS
Primary Care Provider: Provider, No Known     Consult requested by: ER admitting team    Reason for Consultation: Neurological evaluation /left-sided scalp pain    History taken from: patient chart family RN    Chief complaint: Left-sided scalp pain       SUBJECTIVE:    History of present illness: Background per H&P: Angella Santos is a 65 y.o. female who was evaluated in room 7 in the ER at [facility]    Source of information is the patient and medical records and her  who is present at bedside    This patient presented with headache which is pain lateralized by sharp stabbing electric type sensation in the left parietal region, more towards the front than the occipital area    There is 1 spot which is about an inch in diameter but not really tender to touch    The pain can last for 15 seconds or so and then remit and return    She takes Advil or Tylenol and it helps her for about 4 to 5 hours and then it returns    It is not precipitated by any activity or touching or talking about speaking or chewing    Obviously there is no speech swallowing or breathing or vision changes    She has had an event like this about 2 years ago and it just dissipated    Does not have any significant history of migraines    No trauma    Vital signs reviewed, no significant hypertension  White count 1.53 with no significant anemia and platelets 84  CT head unremarkable      As per admitting,  Chief Complaint: headache     History of Present Illness: Angella Santos is a 65 y.o. female with a CMH of GERD, osteoporosis, h/o kidney stones who presented to [facility] on 5/19/2025 with headache that began yesterday. Reports it began behind her right ear, and has slowly progressed to the left temporal region.  Describes it as a sharp stabbing pain, that comes and goes.  States that she took Tylenol yesterday, and it helped temporarily.  Reports she has had chills, weakness, decreased appetite, nausea since yesterday.   Denies fevers, chest pain, shortness of breath, abdominal pain, vomiting.     In the ED, patient was afebrile and hemodynamically stable.  Labs pertinent for sodium 132, alk phos 171, , .  WBC 1.64, plts 97.  CT head without: negative for acute findings.  Given Zofran, IV fluids.  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  Hospital service to admit for continued evaluation.        Review of Systems   Constitutional:  Positive for appetite change, chills and fever.   Respiratory:  Negative for shortness of breath.    Gastrointestinal:  Positive for nausea. Negative for abdominal pain and vomiting.   Neurological:  Negative for weakness.                  - Portions of the above HPI were copied from previous encounters and edited as appropriate. PMH as detailed below.     Review of Systems   No fever chills rigors or sweats  No weight issues  No sleep problems  HEENT:  No speech problem, vision changes, facial asymmetry or pain, or neck problem  Chest: No chest pain, clubbing, cyanosis, orthopnea palpitations  Pulmonary:  No shortness of air, cough or expectoration  Abdomen:  No swelling/tension, constipation,diarrhea or pain  No acute genitourinary symptom but has history of renal stone  Extremity problems as discussed, she is degenerative joint disease and osteoporosis  No back problem  No psychotic issues  Neurologic issues as discussed  No hematologic, dermatologic or endocrine problems        PATIENT HISTORY:  Past Medical History:   Diagnosis Date    Degenerative joint disease of knee, left     Fracture of right distal radius 01/25/2025    FELL ON ICE:  RIGHT LOWER ARM CAST, PAIN, LIMITED MOBILITY. SL SWELLING NOTED IN RIGHT FINGERS    GERD (gastroesophageal reflux disease)     History of kidney stones     Osteoporosis    ,   Past Surgical History:   Procedure Laterality Date    COLONOSCOPY  2019    Normal    KIDNEY STONE SURGERY   2012    LITHOTRIPSY STENT     ORIF WRIST FRACTURE Right 2/7/2025    Procedure: RIGHT  DISTAL RADIUS  OPEN REDUCTION INTERNAL FIXATION;  Surgeon: Gaston Larry MD;  [facility] [facility] [place] [facility]  Service: Orthopedics;  Laterality: Right;   ,   Family History   Problem Relation Age of Onset    Arthritis Mother     Diabetes Mother     Heart disease Mother     Cancer Mother     Breast cancer Mother     Hypertension Father     Alcohol abuse Father     Early death Father     Kidney disease Sister     Diabetes Other     Hypertension Other     Hyperlipidemia Other     Cancer Other     Malig Hyperthermia Neg Hx    ,   Social History     Tobacco Use    Smoking status: Never    Smokeless tobacco: Never   Vaping Use    Vaping status: Never Used   Substance Use Topics    Alcohol use: Yes     Alcohol/week: 3.0 standard drinks of alcohol     Types: 3 Glasses of wine per week     Comment: WEEKLY    Drug use: Never   ,   Prior to Admission medications    Medication Sig Start Date End Date Taking? Authorizing Provider   acetaminophen (TYLENOL) 500 MG tablet Take 2 tablets by mouth Every 6 (Six) Hours As Needed for Mild Pain.   Yes Joe Garcia MD   Calcium Citrate-Vitamin D3 (CITRACAL) 315-6.25 MG-MCG tablet tablet Take 2 tablets by mouth Daily. Patient stated she is taking calcium with vit d not just calcium   Yes Joe Garcia MD   Cyanocobalamin (B-12) 1000 MCG tablet Take 1 tablet by mouth Daily. 1/11/19  Yes Joe Garcia MD   estradiol (ESTRACE) 0.1 MG/GM vaginal cream Use fingertip amount to vagina nightly for 2 weeks then 1-2 times weekly. 10/3/24  Yes    Magnesium 250 MG tablet Take 2 tablets by mouth Every Night. 4/13/17  Yes Joe Garcia MD   multivitamin with minerals tablet tablet Take 1 tablet by mouth Daily. 11/30/20  Yes Joe Garcia MD   pantoprazole (Protonix) 20 MG EC tablet Take 1 tablet by mouth Daily. 2/25/25  Yes Nikhil Maloney MD   Turmeric (QC TUMERIC COMPLEX PO)  Take 1 tablet by mouth Daily. CHEWABLE  HOLD FOR SURGERY  Patient taking differently: Take 2 tablets by mouth Daily. CHEWABLE  HOLD FOR SURGERY   Yes Provider, MD Joe   clobetasol propionate (TEMOVATE) 0.05 % cream Apply to external vulva 1-2 times weekly as needed.  Patient taking differently: Apply 1 Application topically to the appropriate area as directed As Needed. 10/3/24      HYDROcodone-acetaminophen (NORCO) 5-325 MG per tablet Take 1 tablet by mouth Every 4 (Four) Hours As Needed for Severe Pain. 2/7/25   Gaston Larry MD   zoledronic acid (Reclast) 5 MG/100ML solution infusion 100 mL 1 (One) Time. YEARLY  OSTEOPROSIS 11/8/23   Provider, MD Joe    Allergies:  Cephalexin    Current Facility-Administered Medications   Medication Dose Route Frequency Provider Last Rate Last Admin    Calcium Replacement - Follow Nurse / BPA Driven Protocol   Not Applicable PRN Noemi Ramey PA-C        Magnesium Standard Dose Replacement - Follow Nurse / BPA Driven Protocol   Not Applicable PRN Noemi Ramey PA-C        melatonin tablet 5 mg  5 mg Oral Nightly PRN Noemi Ramey PA-C        ondansetron ODT (ZOFRAN-ODT) disintegrating tablet 4 mg  4 mg Oral Q6H PRN Noemi Ramey PA-C        Or    ondansetron (ZOFRAN) injection 4 mg  4 mg Intravenous Q6H PRN Noemi Ramey PA-C        pantoprazole (PROTONIX) EC tablet 40 mg  40 mg Oral QAM AC Noemi Ramey PA-C   40 mg at 05/20/25 0537    Phosphorus Replacement - Follow Nurse / BPA Driven Protocol   Not Applicable PRN Noemi Ramey PA-C        Potassium Replacement - Follow Nurse / BPA Driven Protocol   Not Applicable PRN Noemi Ramey PA-C        sodium chloride 0.9 % flush 10 mL  10 mL Intravenous PRN Magalie Salter APRN        sodium chloride 0.9 % infusion  75 mL/hr Intravenous Continuous Noemi Ramey PA-C 75 mL/hr at 05/20/25 0535 75 mL/hr at 05/20/25 0535     Current Outpatient Medications   Medication Sig  Dispense Refill    acetaminophen (TYLENOL) 500 MG tablet Take 2 tablets by mouth Every 6 (Six) Hours As Needed for Mild Pain.      Calcium Citrate-Vitamin D3 (CITRACAL) 315-6.25 MG-MCG tablet tablet Take 2 tablets by mouth Daily. Patient stated she is taking calcium with vit d not just calcium      Cyanocobalamin (B-12) 1000 MCG tablet Take 1 tablet by mouth Daily.      estradiol (ESTRACE) 0.1 MG/GM vaginal cream Use fingertip amount to vagina nightly for 2 weeks then 1-2 times weekly. 42.5 g 2    Magnesium 250 MG tablet Take 2 tablets by mouth Every Night.      multivitamin with minerals tablet tablet Take 1 tablet by mouth Daily.      pantoprazole (Protonix) 20 MG EC tablet Take 1 tablet by mouth Daily. 90 tablet 3    Turmeric (QC TUMERIC COMPLEX PO) Take 1 tablet by mouth Daily. CHEWABLE  HOLD FOR SURGERY (Patient taking differently: Take 2 tablets by mouth Daily. CHEWABLE  HOLD FOR SURGERY)      clobetasol propionate (TEMOVATE) 0.05 % cream Apply to external vulva 1-2 times weekly as needed. (Patient taking differently: Apply 1 Application topically to the appropriate area as directed As Needed.) 15 g 2    HYDROcodone-acetaminophen (NORCO) 5-325 MG per tablet Take 1 tablet by mouth Every 4 (Four) Hours As Needed for Severe Pain. 30 tablet 0    zoledronic acid (Reclast) 5 MG/100ML solution infusion 100 mL 1 (One) Time. YEARLY  OSTEOPROSIS          ________________________________________________________        OBJECTIVE:  Upon today's exam, patient is otherwise resting comfortably in bed in no acute distress      The patient is awake and alert and oriented x3  Normal speech  Cranial nerve examination demonstrate:  Full fields of vision to confrontation  Pupils are round, reactive to light and accommodation and size of about 3 mm  No ptosis or nystagmus  Funduscopic examination was not successful  Eye movements are conjugate     Sensation on the face and scalp are normal  Muscles of mastication are normal and  symmetric  Muscles of  facial expression are normal and symmetric  Hearing is intact bilaterally  Head turning and shoulder shrugs were unremarkable  Tongue was midline  I could not visualize  oropharynx or uvula       No scalp lesion or tenderness    Motor examination:  Normal bulk, tone and strength was 5/5  No fasciculations     Sensory examination:  Intact for soft touch, pain   Romberg was not evaluated     Reflexes:  0/4     Coordination:  Normal finger-to-nose to finger, rapid alternating movements and toe to finger     Gait:  Deferred     Toe signs:  [place]            ________________________________________________________   RESULTS REVIEW:    VITAL SIGNS:   Temp:  [98 °F (36.7 °C)-99.6 °F (37.6 °C)] 99.6 °F (37.6 °C)  Heart Rate:  [73-87] 73  Resp:  [12-17] 14  BP: (103-142)/(61-91) 103/73     LABS:      Lab 05/20/25 0323 05/19/25 2353 05/19/25 2016   WBC  --  1.53* 1.64*   HEMOGLOBIN  --  13.6 13.6   HEMATOCRIT  --  40.8 41.6   PLATELETS  --  84* 97*   NEUTROS ABS  --  1.19* 1.34*   IMMATURE GRANS (ABS)  --   --  0.01   LYMPHS ABS  --   --  0.18*   MONOS ABS  --   --  0.10   EOS ABS  --   --  0.00   MCV  --  93.6 93.9   PROTIME 14.1  --   --    APTT 29.2  --   --          Lab 05/19/25 2353 05/19/25 2016   SODIUM 137 132*   POTASSIUM 4.7 4.4   CHLORIDE 104 99   CO2 23.9 23.8   ANION GAP 9.1 9.2   BUN 13 16   CREATININE 0.90 0.99   EGFR 71.1 63.4   GLUCOSE 134* 164*   CALCIUM 9.0 9.2   MAGNESIUM  --  2.0   PHOSPHORUS  --  2.5         Lab 05/19/25 2353 05/19/25 2016   TOTAL PROTEIN 6.4 6.6   ALBUMIN 4.0 4.3   GLOBULIN 2.4 2.3   ALT (SGPT) 140* 132*   AST (SGOT) 166* 140*   BILIRUBIN 0.5 0.5   ALK PHOS 181* 171*         Lab 05/20/25  0323   PROTIME 14.1   INR 1.09             Lab 05/20/25  0323   IRON 25*   IRON SATURATION (TSAT) 7*   TIBC 368   TRANSFERRIN 247   FERRITIN 385.00*   FOLATE >20.00   VITAMIN B 12 1,443*         UA          2/5/2025    08:34   Urinalysis   Specific Gravity, UA 1.007     Ketones, UA Negative    Blood, UA Negative    Leukocytes, UA Negative    Nitrite, UA Negative        Lab Results   Component Value Date    TSH 2.880 01/08/2025     (H) 01/08/2025    HGBA1C 5.70 (H) 01/16/2025    [id number] 1,443 (H) 05/20/2025       IMAGING STUDIES:  US Abdomen Limited  Result Date: 5/20/2025  Impression: 1. Mildly increased hepatic echogenicity may relate to underlying hepatic steatosis. 2. Spleen size normal with calculated splenic volume of 358 mL. 3. No biliary dilatation. Electronically Signed: Thomas Chin MD  5/20/2025 7:52 AM EDT  Workstation ID: [id number]    CT Head Without Contrast  Result Date: 5/19/2025  Impression: No acute intracranial abnormality. Electronically Signed: Pieter Palm DO  5/19/2025 9:11 PM EDT  Workstation ID: [id number]      I reviewed the patient's new clinical results.    ________________________________________________________     PROBLEM LIST:    Leukopenia            ASSESSMENT/PLAN:  Scalp pain  Leukopenia    This looks more like muscular or musculoskeletal    I will check her MRI brain    I would use baclofen as needed and may try gabapentin if it helps her then I may start her on low-dose, 100 to 200 mg gabapentin  3 times daily  And use baclofen for breakthrough, that is if the MRI brain is negative            I discussed the patient's findings and my recommendations with patient, family, and nursing staff    Joel Carlton MD  05/20/25  12:08 EDT

## 2025-05-20 NOTE — PLAN OF CARE
Goal Outcome Evaluation:      Patient alert and oriented x 4. Able to make needs known. Pain treated per medications on the MAR. Ambulating independently, tolerating well. Personal items and call light in reach. Plan of care is ongoing.

## 2025-05-20 NOTE — CONSULTS
Hematology/Oncology Inpatient Consultation    Patient name: Angella Santos  : 1960  MRN: [id number]  Referring Provider: ARABELLA Moore  Reason for Consultation: Leukopenia    Chief complaint: Headache    History of present illness:    Angella Santos is a 65 y.o. female with past medical history significant for GERD, osteoporosis, h/o kidney stones  who presented to [facility] on 2025 with complaints of headache. Reports it began behind her right ear, and has slowly progressed to the left temporal region.  Describes it as a sharp stabbing pain, that comes and goes.  States that she took Tylenol yesterday, and it helped temporarily.  Reports she has had chills, weakness, decreased appetite, nausea since yesterday.  Denies fevers, chest pain, shortness of breath, abdominal pain, vomiting.  CT imaging of the head with no acute findings.  Abdominal ultrasound with mildly increased hepatic echogenicity which may be related to hepatic steatosis.  Normal spleen size.    She was noted to have leukopenia with a white blood cell count of 1.64.differential with lymphopenia as well as neutropenia.  50% bands and 10% metamyelocytes.  Thrombocytopenia with platelet count of 97,000.  As compared to blood count completed in 2025 this is a new finding.    25  Hematology/Oncology was consulted.    He/She  has a past medical history of Degenerative joint disease of knee, left, Fracture of right distal radius (2025), GERD (gastroesophageal reflux disease), History of kidney stones, and Osteoporosis.    PCP: Nikhil Maloney MD    History:  Past Medical History:   Diagnosis Date    Degenerative joint disease of knee, left     Fracture of right distal radius 2025    FELL ON ICE:  RIGHT LOWER ARM CAST, PAIN, LIMITED MOBILITY. SL SWELLING NOTED IN RIGHT FINGERS    GERD (gastroesophageal reflux disease)     History of kidney stones     Osteoporosis    ,   Past Surgical History:  "  Procedure Laterality Date    COLONOSCOPY  2019    Normal    KIDNEY STONE SURGERY  2012    LITHOTRIPSY STENT     ORIF WRIST FRACTURE Right 2/7/2025    Procedure: RIGHT  DISTAL RADIUS  OPEN REDUCTION INTERNAL FIXATION;  Surgeon: Gaston Larry MD;  Location: [facility] OR [facility] [place];  Service: Orthopedics;  Laterality: Right;   ,   Family History   Problem Relation Age of Onset    Arthritis Mother     Diabetes Mother     Heart disease Mother     Cancer Mother     Breast cancer Mother     Hypertension Father     Alcohol abuse Father     Early death Father     Kidney disease Sister     Diabetes Other     Hypertension Other     Hyperlipidemia Other     Cancer Other     Malig Hyperthermia Neg Hx    ,   Social History     Tobacco Use    Smoking status: Never    Smokeless tobacco: Never   Vaping Use    Vaping status: Never Used   Substance Use Topics    Alcohol use: Yes     Alcohol/week: 3.0 standard drinks of alcohol     Types: 3 Glasses of wine per week     Comment: WEEKLY    Drug use: Never   , (Not in a hospital admission)  , Scheduled Meds:  pantoprazole, 40 mg, Oral, QAM AC    , Continuous Infusions:  sodium chloride, 75 mL/hr, Last Rate: 75 mL/hr (05/20/25 0535)    , PRN Meds:    Calcium Replacement - Follow Nurse / BPA Driven Protocol    Magnesium Standard Dose Replacement - Follow Nurse / BPA Driven Protocol    melatonin    ondansetron ODT **OR** ondansetron    Phosphorus Replacement - Follow Nurse / BPA Driven Protocol    Potassium Replacement - Follow Nurse / BPA Driven Protocol    [COMPLETED] Insert Peripheral IV **AND** sodium chloride   Allergies:  Cephalexin    Subjective     ROS:  Review of Systems     Objective   Vital Signs:   /80 (BP Location: Left arm, Patient Position: Lying)   Pulse 82   Temp 99.1 °F (37.3 °C) (Oral)   Resp 14   Ht 165.1 cm (65\")   Wt 56.6 kg (124 lb 12.5 oz)   SpO2 97%   BMI 20.76 kg/m²     Physical Exam: (performed by MD)  Physical Exam    Results Review:  Lab Results (last " 48 hours)       Procedure Component Value Units Date/Time    Pathology Consultation [[id number]] Collected: 05/20/25 0323    Specimen: Blood, Venous Line Updated: 05/20/25 0948    Iron Profile [[id number]]  (Abnormal) Collected: 05/20/25 0323    Specimen: Blood Updated: 05/20/25 0413     Iron 25 mcg/dL      Iron Saturation (TSAT) 7 %      Transferrin 247 mg/dL      TIBC 368 mcg/dL     HIV-1 / O / 2 Ag / Antibody [[id number]]  (Normal) Collected: 05/20/25 0323    Specimen: Blood Updated: 05/20/25 0401     HIV DUO Non-Reactive    Narrative:      The HIV antibody/antigen combo assay is a qualitative assay for HIV that includes the p24 antigen as well as antibodies to HIV types 1 and 2. This test is intended to be used as a screening assay in the diagnosis of HIV infection in patients over the age of 2.    Hepatitis Panel, Acute [[id number]]  (Normal) Collected: 05/20/25 0323    Specimen: Blood Updated: 05/20/25 0400     Hepatitis B Surface Ag Non-Reactive     Hep A IgM Non-Reactive     Hep B C IgM Non-Reactive     Hepatitis C Ab Non-Reactive    Narrative:      Results may be falsely decreased if patient taking Biotin.     Peripheral Blood Smear [[id number]] Collected: 05/20/25 0323    Specimen: Blood Updated: 05/20/25 0400     Pathology Review Yes    aPTT [[id number]]  (Normal) Collected: 05/20/25 0323    Specimen: Blood Updated: 05/20/25 0339     PTT 29.2 seconds     Protime-INR [[id number]]  (Normal) Collected: 05/20/25 0323    Specimen: Blood Updated: 05/20/25 0339     Protime 14.1 Seconds      INR 1.09    Vitamin B12 [[id number]] Collected: 05/20/25 0323    Specimen: Blood Updated: 05/20/25 0328    CBC & Differential [[id number]]  (Abnormal) Collected: 05/19/25 2353    Specimen: Blood from Arm, Left Updated: 05/20/25 0051    Narrative:      The following orders were created for panel order CBC & Differential.  Procedure                               Abnormality         Status                     ---------                                -----------         ------                     CBC Auto Differential[[id number]]        Abnormal            Final result               Scan Slide[[id number]]                                       Final result                 Please view results for these tests on the individual orders.    Scan Slide [[id number]] Collected: 05/19/25 2353    Specimen: Blood from Arm, Left Updated: 05/20/25 0051     Scan Slide --     Comment: See Manual Differential Results       Manual Differential [[id number]]  (Abnormal) Collected: 05/19/25 2353    Specimen: Blood from Arm, Left Updated: 05/20/25 0051     Neutrophil % 28.0 %      Lymphocyte % 8.0 %      Bands %  50.0 %      Metamyelocyte % 10.0 %      Atypical Lymphocyte % 4.0 %      Neutrophils Absolute 1.19 10*3/mm3      Lymphocytes Absolute 0.18 10*3/mm3      Dacrocytes Slight/1+     Poikilocytes Slight/1+     WBC Morphology Normal     Platelet Estimate Decreased    CBC Auto Differential [[id number]]  (Abnormal) Collected: 05/19/25 2353    Specimen: Blood from Arm, Left Updated: 05/20/25 0051     WBC 1.53 10*3/mm3      RBC 4.36 10*6/mm3      Hemoglobin 13.6 g/dL      Hematocrit 40.8 %      MCV 93.6 fL      MCH 31.2 pg      MCHC 33.3 g/dL      RDW 12.3 %      RDW-SD 42.9 fl      MPV 10.9 fL      Platelets 84 10*3/mm3     Comprehensive Metabolic Panel [[id number]]  (Abnormal) Collected: 05/19/25 2353    Specimen: Blood from Arm, Left Updated: 05/20/25 0023     Glucose 134 mg/dL      BUN 13 mg/dL      Creatinine 0.90 mg/dL      Sodium 137 mmol/L      Potassium 4.7 mmol/L      Chloride 104 mmol/L      CO2 23.9 mmol/L      Calcium 9.0 mg/dL      Total Protein 6.4 g/dL      Albumin 4.0 g/dL      ALT (SGPT) 140 U/L      AST (SGOT) 166 U/L      Alkaline Phosphatase 181 U/L      Total Bilirubin 0.5 mg/dL      Globulin 2.4 gm/dL      A/G Ratio 1.7 g/dL      BUN/Creatinine Ratio 14.4     Anion Gap 9.1 mmol/L      eGFR 71.1 mL/min/1.73     Narrative:      GFR Categories in Chronic  Kidney Disease (CKD)              GFR Category          GFR (mL/min/1.73)    Interpretation  G1                    90 or greater        Normal or high (1)  G2                    60-89                Mild decrease (1)  G3a                   45-59                Mild to moderate decrease  G3b                   30-44                Moderate to severe decrease  G4                    15-29                Severe decrease  G5                    14 or less           Kidney failure    (1)In the absence of evidence of kidney disease, neither GFR category G1 or G2 fulfill the criteria for CKD.    eGFR calculation 2021 CKD-EPI creatinine equation, which does not include race as a factor    Magnesium [[id number]]  (Normal) Collected: 05/19/25 2016    Specimen: Blood from Arm, Right Updated: 05/19/25 2237     Magnesium 2.0 mg/dL     Phosphorus [[id number]]  (Normal) Collected: 05/19/25 2016    Specimen: Blood from Arm, Right Updated: 05/19/25 2237     Phosphorus 2.5 mg/dL     Rickettsia Species DNA, Real-Time PCR [[id number]] Collected: 05/19/25 2210    Specimen: Blood from Arm, Right Updated: 05/19/25 2215    Ehrlichia Profile DNA PCR [[id number]] Collected: 05/19/25 2210    Specimen: Blood from Arm, Right Updated: 05/19/25 2215    Lyme Antibody Total with Reflex (ZAYNAB) [[id number]] Collected: 05/19/25 2210    Specimen: Blood from Arm, Right Updated: 05/19/25 2215    Comprehensive Metabolic Panel [[id number]]  (Abnormal) Collected: 05/19/25 2016    Specimen: Blood from Arm, Right Updated: 05/19/25 2042     Glucose 164 mg/dL      BUN 16 mg/dL      Creatinine 0.99 mg/dL      Sodium 132 mmol/L      Potassium 4.4 mmol/L      Chloride 99 mmol/L      CO2 23.8 mmol/L      Calcium 9.2 mg/dL      Total Protein 6.6 g/dL      Albumin 4.3 g/dL      ALT (SGPT) 132 U/L      AST (SGOT) 140 U/L      Alkaline Phosphatase 171 U/L      Total Bilirubin 0.5 mg/dL      Globulin 2.3 gm/dL      A/G Ratio 1.9 g/dL      BUN/Creatinine Ratio 16.2     Anion Gap  9.2 mmol/L      eGFR 63.4 mL/min/1.73     Narrative:      GFR Categories in Chronic Kidney Disease (CKD)              GFR Category          GFR (mL/min/1.73)    Interpretation  G1                    90 or greater        Normal or high (1)  G2                    60-89                Mild decrease (1)  G3a                   45-59                Mild to moderate decrease  G3b                   30-44                Moderate to severe decrease  G4                    15-29                Severe decrease  G5                    14 or less           Kidney failure    (1)In the absence of evidence of kidney disease, neither GFR category G1 or G2 fulfill the criteria for CKD.    eGFR calculation 2021 CKD-EPI creatinine equation, which does not include race as a factor    CBC & Differential [[id number]]  (Abnormal) Collected: 05/19/25 2016    Specimen: Blood from Arm, Right Updated: 05/19/25 2037    Narrative:      The following orders were created for panel order CBC & Differential.  Procedure                               Abnormality         Status                     ---------                               -----------         ------                     CBC Auto Differential[[id number]]        Abnormal            Final result               Scan Slide[[id number]]                   Normal              Final result                 Please view results for these tests on the individual orders.    CBC Auto Differential [[id number]]  (Abnormal) Collected: 05/19/25 2016    Specimen: Blood from Arm, Right Updated: 05/19/25 2037     WBC 1.64 10*3/mm3      RBC 4.43 10*6/mm3      Hemoglobin 13.6 g/dL      Hematocrit 41.6 %      MCV 93.9 fL      MCH 30.7 pg      MCHC 32.7 g/dL      RDW 12.4 %      RDW-SD 43.1 fl      MPV 10.9 fL      Platelets 97 10*3/mm3      Neutrophil % 81.7 %      Lymphocyte % 11.0 %      Monocyte % 6.1 %      Eosinophil % 0.0 %      Basophil % 0.6 %      Immature Grans % 0.6 %      Neutrophils, Absolute 1.34 10*3/mm3       Lymphocytes, Absolute 0.18 10*3/mm3      Monocytes, Absolute 0.10 10*3/mm3      Eosinophils, Absolute 0.00 10*3/mm3      Basophils, Absolute 0.01 10*3/mm3      Immature Grans, Absolute 0.01 10*3/mm3      nRBC 0.0 /100 WBC     Scan Slide [[id number]]  (Normal) Collected: 05/19/25 2016    Specimen: Blood from Arm, Right Updated: 05/19/25 2037     RBC Morphology Normal     WBC Morphology Normal     Platelet Morphology Normal    Narrative:      Slide Reviewed               Pending Results:     Imaging Reviewed:   US Abdomen Limited  Result Date: 5/20/2025  Impression: 1. Mildly increased hepatic echogenicity may relate to underlying hepatic steatosis. 2. Spleen size normal with calculated splenic volume of 358 mL. 3. No biliary dilatation. Electronically Signed: Thomas Chin MD  5/20/2025 7:52 AM EDT  Workstation ID: [id number]    CT Head Without Contrast  Result Date: 5/19/2025  Impression: No acute intracranial abnormality. Electronically Signed: Pieter Palm DO  5/19/2025 9:11 PM EDT  Workstation ID: [id number]           Assessment & Plan   ASSESSMENT  Leukopenia, thrombocytopenia: This is new as compared to January 2025. Will investigate further including peripheral smear, flow cytometry, nutritional workup, and infectious workup. Continue to monitor blood counts.  Headache: CT imaging with no acute intracranial abnormality.  Further workup per primary team    PLAN  As above  Further recommendations per Dr. George    Above note was prepared for Dr. Josef George -physical exam and review of systems were performed by physician.     Electronically signed by Opal Adams PA-C, 05/20/25 5/20/2025: I was asked to see Ms. Santos who came to the hospital complaining of an intense headache.  Headaches are relatively unusual for and never as long-lasting as this time.  She had imaging of the brain that revealed no obvious abnormalities.  In addition she had laboratory exams.  A blood count reported leukopenia  with mild neutropenia and lymphopenia.  Hemoglobin and mean corpuscular volume were well within the normal range and she also had thrombocytopenia.  I am asked to investigate.  She is feeling better this morning although she persists with headache.  In general, however, she has felt well.  She reports being active and maintaining a stable weight.  Her appetite is adequate and she has been afebrile and without nocturnal diaphoresis.  She has had no respiratory symptoms, chest pains or abdominal pain.  She has been taking regular bowel activity and has had no dysuria or hematuria.  She has not started to take any medications in the recent past.  She takes multiple supplements.  She has no significant past medical history.  On exam she is a slender well-built woman who is conversant and well-oriented.  She does not seem acutely ill.  She is neither jaundiced nor pale.  No oral lesions.  Respirations not labored and lungs clear bilaterally.  Heart regular.  Abdomen soft and without hepatomegaly or splenomegaly.  No edema.  Laboratory exams reviewed.  Discussed with her.  The changes appear to be rather acute and without an obvious explanation at this time.  I have requested laboratory exams and she may need to have a bone marrow biopsy and aspiration.  Discussed at length with her.  I reviewed and discussed with Ms. Devora PLUNKETT the records.  I concur with her note.  I formulated the analysis and the plans.    Josef George MD on 5/20/2025 at 4:31 PM.

## 2025-05-21 ENCOUNTER — APPOINTMENT (OUTPATIENT)
Dept: CT IMAGING | Facility: HOSPITAL | Age: 65
DRG: 872 | End: 2025-05-21
Payer: MEDICARE

## 2025-05-21 ENCOUNTER — APPOINTMENT (OUTPATIENT)
Dept: INTERVENTIONAL RADIOLOGY/VASCULAR | Facility: HOSPITAL | Age: 65
DRG: 872 | End: 2025-05-21
Payer: MEDICARE

## 2025-05-21 ENCOUNTER — APPOINTMENT (OUTPATIENT)
Dept: GENERAL RADIOLOGY | Facility: HOSPITAL | Age: 65
DRG: 872 | End: 2025-05-21
Payer: MEDICARE

## 2025-05-21 LAB
ALBUMIN SERPL-MCNC: 3.9 G/DL (ref 3.5–5.2)
ALBUMIN/GLOB SERPL: 1.7 G/DL
ALP SERPL-CCNC: 409 U/L (ref 39–117)
ALT SERPL W P-5'-P-CCNC: 175 U/L (ref 1–33)
ANA SER QL: NEGATIVE
ANION GAP SERPL CALCULATED.3IONS-SCNC: 9.9 MMOL/L (ref 5–15)
ANISOCYTOSIS BLD QL: ABNORMAL
AST SERPL-CCNC: 177 U/L (ref 1–32)
B BURGDOR IGG SER QL: NEGATIVE
B PARAPERT DNA SPEC QL NAA+PROBE: NOT DETECTED
B PERT DNA SPEC QL NAA+PROBE: NOT DETECTED
BACTERIA UR QL AUTO: NORMAL /HPF
BILIRUB SERPL-MCNC: 0.8 MG/DL (ref 0–1.2)
BILIRUB UR QL STRIP: ABNORMAL
BUN SERPL-MCNC: 10 MG/DL (ref 8–23)
BUN/CREAT SERPL: 9.9 (ref 7–25)
C PNEUM DNA NPH QL NAA+NON-PROBE: NOT DETECTED
CALCIUM SPEC-SCNC: 9 MG/DL (ref 8.6–10.5)
CHLORIDE SERPL-SCNC: 103 MMOL/L (ref 98–107)
CLARITY UR: CLEAR
CO2 SERPL-SCNC: 24.1 MMOL/L (ref 22–29)
COLOR UR: ABNORMAL
CREAT SERPL-MCNC: 1.01 MG/DL (ref 0.57–1)
DEPRECATED RDW RBC AUTO: 43.1 FL (ref 37–54)
EGFRCR SERPLBLD CKD-EPI 2021: 61.9 ML/MIN/1.73
ERYTHROCYTE [DISTWIDTH] IN BLOOD BY AUTOMATED COUNT: 12.4 % (ref 12.3–15.4)
FLUAV SUBTYP SPEC NAA+PROBE: NOT DETECTED
FLUBV RNA ISLT QL NAA+PROBE: NOT DETECTED
GLOBULIN UR ELPH-MCNC: 2.3 GM/DL
GLUCOSE SERPL-MCNC: 99 MG/DL (ref 65–99)
GLUCOSE UR STRIP-MCNC: NEGATIVE MG/DL
HADV DNA SPEC NAA+PROBE: NOT DETECTED
HCOV 229E RNA SPEC QL NAA+PROBE: NOT DETECTED
HCOV HKU1 RNA SPEC QL NAA+PROBE: NOT DETECTED
HCOV NL63 RNA SPEC QL NAA+PROBE: NOT DETECTED
HCOV OC43 RNA SPEC QL NAA+PROBE: NOT DETECTED
HCT VFR BLD AUTO: 40.3 % (ref 34–46.6)
HGB BLD-MCNC: 13.1 G/DL (ref 12–15.9)
HGB UR QL STRIP.AUTO: NEGATIVE
HMPV RNA NPH QL NAA+NON-PROBE: NOT DETECTED
HPIV1 RNA ISLT QL NAA+PROBE: NOT DETECTED
HPIV2 RNA SPEC QL NAA+PROBE: NOT DETECTED
HPIV3 RNA NPH QL NAA+PROBE: NOT DETECTED
HPIV4 P GENE NPH QL NAA+PROBE: NOT DETECTED
HYALINE CASTS UR QL AUTO: NORMAL /LPF
KETONES UR QL STRIP: ABNORMAL
LEUKOCYTE ESTERASE UR QL STRIP.AUTO: ABNORMAL
LYMPHOCYTES # BLD MANUAL: 0.22 10*3/MM3 (ref 0.7–3.1)
LYMPHOCYTES NFR BLD MANUAL: 3 % (ref 5–12)
Lab: NORMAL
M PNEUMO IGG SER IA-ACNC: NOT DETECTED
MCH RBC QN AUTO: 30.4 PG (ref 26.6–33)
MCHC RBC AUTO-ENTMCNC: 32.5 G/DL (ref 31.5–35.7)
MCV RBC AUTO: 93.5 FL (ref 79–97)
METAMYELOCYTES NFR BLD MANUAL: 11 % (ref 0–0)
MONOCYTES # BLD: 0.03 10*3/MM3 (ref 0.1–0.9)
NEUTROPHILS # BLD AUTO: 0.68 10*3/MM3 (ref 1.7–7)
NEUTROPHILS NFR BLD MANUAL: 25 % (ref 42.7–76)
NEUTS BAND NFR BLD MANUAL: 40 % (ref 0–5)
NITRITE UR QL STRIP: NEGATIVE
PH UR STRIP.AUTO: 6 [PH] (ref 5–8)
PLATELET # BLD AUTO: 53 10*3/MM3 (ref 140–450)
PMV BLD AUTO: 11.5 FL (ref 6–12)
POTASSIUM SERPL-SCNC: 5 MMOL/L (ref 3.5–5.2)
PROCALCITONIN SERPL-MCNC: 0.19 NG/ML (ref 0–0.25)
PROT SERPL-MCNC: 6.2 G/DL (ref 6–8.5)
PROT UR QL STRIP: ABNORMAL
RBC # BLD AUTO: 4.31 10*6/MM3 (ref 3.77–5.28)
RBC # UR STRIP: NORMAL /HPF
REF LAB TEST METHOD: NORMAL
RHINOVIRUS RNA SPEC NAA+PROBE: NOT DETECTED
RSV RNA NPH QL NAA+NON-PROBE: NOT DETECTED
SARS-COV-2 RNA RESP QL NAA+PROBE: NOT DETECTED
SCAN SLIDE: NORMAL
SMALL PLATELETS BLD QL SMEAR: ABNORMAL
SODIUM SERPL-SCNC: 137 MMOL/L (ref 136–145)
SP GR UR STRIP: 1.02 (ref 1–1.03)
SQUAMOUS #/AREA URNS HPF: NORMAL /HPF
UROBILINOGEN UR QL STRIP: ABNORMAL
VARIANT LYMPHS NFR BLD MANUAL: 16 % (ref 19.6–45.3)
VARIANT LYMPHS NFR BLD MANUAL: 5 % (ref 0–5)
WBC # UR STRIP: NORMAL /HPF
WBC MORPH BLD: NORMAL
WBC NRBC COR # BLD AUTO: 1.04 10*3/MM3 (ref 3.4–10.8)

## 2025-05-21 PROCEDURE — 86665 EPSTEIN-BARR CAPSID VCA: CPT | Performed by: NURSE PRACTITIONER

## 2025-05-21 PROCEDURE — 87799 DETECT AGENT NOS DNA QUANT: CPT | Performed by: NURSE PRACTITIONER

## 2025-05-21 PROCEDURE — 87468 ANAPLSMA PHGCYTOPHLM AMP PRB: CPT | Performed by: NURSE PRACTITIONER

## 2025-05-21 PROCEDURE — 99232 SBSQ HOSP IP/OBS MODERATE 35: CPT | Performed by: INTERNAL MEDICINE

## 2025-05-21 PROCEDURE — 88313 SPECIAL STAINS GROUP 2: CPT | Performed by: INTERNAL MEDICINE

## 2025-05-21 PROCEDURE — 86664 EPSTEIN-BARR NUCLEAR ANTIGEN: CPT | Performed by: NURSE PRACTITIONER

## 2025-05-21 PROCEDURE — 86644 CMV ANTIBODY: CPT | Performed by: NURSE PRACTITIONER

## 2025-05-21 PROCEDURE — 81001 URINALYSIS AUTO W/SCOPE: CPT | Performed by: INTERNAL MEDICINE

## 2025-05-21 PROCEDURE — 86645 CMV ANTIBODY IGM: CPT | Performed by: NURSE PRACTITIONER

## 2025-05-21 PROCEDURE — 25010000002 MIDAZOLAM PER 1 MG: Performed by: RADIOLOGY

## 2025-05-21 PROCEDURE — 88305 TISSUE EXAM BY PATHOLOGIST: CPT | Performed by: INTERNAL MEDICINE

## 2025-05-21 PROCEDURE — 25010000002 FENTANYL CITRATE (PF) 50 MCG/ML SOLUTION: Performed by: RADIOLOGY

## 2025-05-21 PROCEDURE — 86663 EPSTEIN-BARR ANTIBODY: CPT | Performed by: NURSE PRACTITIONER

## 2025-05-21 PROCEDURE — 84145 PROCALCITONIN (PCT): CPT | Performed by: INTERNAL MEDICINE

## 2025-05-21 PROCEDURE — 85007 BL SMEAR W/DIFF WBC COUNT: CPT

## 2025-05-21 PROCEDURE — 079T3ZX DRAINAGE OF BONE MARROW, PERCUTANEOUS APPROACH, DIAGNOSTIC: ICD-10-PCS | Performed by: RADIOLOGY

## 2025-05-21 PROCEDURE — 88311 DECALCIFY TISSUE: CPT | Performed by: INTERNAL MEDICINE

## 2025-05-21 PROCEDURE — 80053 COMPREHEN METABOLIC PANEL: CPT

## 2025-05-21 PROCEDURE — 88333 PATH CONSLTJ SURG CYTO XM 1: CPT | Performed by: INTERNAL MEDICINE

## 2025-05-21 PROCEDURE — 71045 X-RAY EXAM CHEST 1 VIEW: CPT

## 2025-05-21 PROCEDURE — 77012 CT SCAN FOR NEEDLE BIOPSY: CPT

## 2025-05-21 PROCEDURE — 0202U NFCT DS 22 TRGT SARS-COV-2: CPT | Performed by: NURSE PRACTITIONER

## 2025-05-21 PROCEDURE — 99152 MOD SED SAME PHYS/QHP 5/>YRS: CPT

## 2025-05-21 PROCEDURE — 87484 EHRLICHA CHAFFEENSIS AMP PRB: CPT | Performed by: NURSE PRACTITIONER

## 2025-05-21 PROCEDURE — 25010000002 MEROPENEM PER 100 MG: Performed by: INTERNAL MEDICINE

## 2025-05-21 PROCEDURE — 25010000002 ONDANSETRON PER 1 MG: Performed by: RADIOLOGY

## 2025-05-21 PROCEDURE — 88312 SPECIAL STAINS GROUP 1: CPT | Performed by: INTERNAL MEDICINE

## 2025-05-21 PROCEDURE — 87040 BLOOD CULTURE FOR BACTERIA: CPT | Performed by: NURSE PRACTITIONER

## 2025-05-21 PROCEDURE — 85025 COMPLETE CBC W/AUTO DIFF WBC: CPT

## 2025-05-21 RX ORDER — GABAPENTIN 300 MG/1
300 CAPSULE ORAL 3 TIMES DAILY PRN
Status: DISCONTINUED | OUTPATIENT
Start: 2025-05-21 | End: 2025-05-26 | Stop reason: HOSPADM

## 2025-05-21 RX ORDER — ONDANSETRON 2 MG/ML
INJECTION INTRAMUSCULAR; INTRAVENOUS AS NEEDED
Status: COMPLETED | OUTPATIENT
Start: 2025-05-21 | End: 2025-05-21

## 2025-05-21 RX ORDER — ACETAMINOPHEN 325 MG/1
650 TABLET ORAL EVERY 6 HOURS PRN
Status: DISCONTINUED | OUTPATIENT
Start: 2025-05-21 | End: 2025-05-26 | Stop reason: HOSPADM

## 2025-05-21 RX ORDER — LEVETIRACETAM 500 MG/1
500 TABLET ORAL EVERY 12 HOURS SCHEDULED
Status: DISCONTINUED | OUTPATIENT
Start: 2025-05-21 | End: 2025-05-24

## 2025-05-21 RX ORDER — FENTANYL CITRATE 50 UG/ML
INJECTION, SOLUTION INTRAMUSCULAR; INTRAVENOUS AS NEEDED
Status: COMPLETED | OUTPATIENT
Start: 2025-05-21 | End: 2025-05-21

## 2025-05-21 RX ORDER — DEXTROSE MONOHYDRATE AND SODIUM CHLORIDE 5; .45 G/100ML; G/100ML
75 INJECTION, SOLUTION INTRAVENOUS CONTINUOUS
Status: DISCONTINUED | OUTPATIENT
Start: 2025-05-21 | End: 2025-05-22

## 2025-05-21 RX ORDER — MIDAZOLAM HYDROCHLORIDE 1 MG/ML
INJECTION, SOLUTION INTRAMUSCULAR; INTRAVENOUS AS NEEDED
Status: COMPLETED | OUTPATIENT
Start: 2025-05-21 | End: 2025-05-21

## 2025-05-21 RX ORDER — IBUPROFEN 400 MG/1
400 TABLET, FILM COATED ORAL EVERY 6 HOURS PRN
Status: DISCONTINUED | OUTPATIENT
Start: 2025-05-21 | End: 2025-05-26 | Stop reason: HOSPADM

## 2025-05-21 RX ADMIN — MIDAZOLAM 0.5 MG: 1 INJECTION INTRAMUSCULAR; INTRAVENOUS at 11:12

## 2025-05-21 RX ADMIN — GABAPENTIN 300 MG: 300 CAPSULE ORAL at 08:21

## 2025-05-21 RX ADMIN — BACLOFEN 10 MG: 10 TABLET ORAL at 08:06

## 2025-05-21 RX ADMIN — IBUPROFEN 400 MG: 400 TABLET, FILM COATED ORAL at 17:05

## 2025-05-21 RX ADMIN — PANTOPRAZOLE SODIUM 40 MG: 40 TABLET, DELAYED RELEASE ORAL at 08:06

## 2025-05-21 RX ADMIN — LEVETIRACETAM 500 MG: 500 TABLET, FILM COATED ORAL at 13:24

## 2025-05-21 RX ADMIN — MEROPENEM 1000 MG: 1 INJECTION INTRAVENOUS at 20:12

## 2025-05-21 RX ADMIN — DEXTROSE AND SODIUM CHLORIDE 75 ML/HR: 5; 450 INJECTION, SOLUTION INTRAVENOUS at 19:41

## 2025-05-21 RX ADMIN — FENTANYL CITRATE 100 MCG: 50 INJECTION, SOLUTION INTRAMUSCULAR; INTRAVENOUS at 11:12

## 2025-05-21 RX ADMIN — ACETAMINOPHEN 650 MG: 325 TABLET, FILM COATED ORAL at 15:58

## 2025-05-21 RX ADMIN — ONDANSETRON 4 MG: 2 INJECTION INTRAMUSCULAR; INTRAVENOUS at 11:09

## 2025-05-21 RX ADMIN — SENNOSIDES 2 TABLET: 8.6 TABLET, FILM COATED ORAL at 18:40

## 2025-05-21 RX ADMIN — BUTALBITAL, ACETAMINOPHEN, AND CAFFEINE 1 TABLET: 325; 50; 40 TABLET ORAL at 08:06

## 2025-05-21 RX ADMIN — IBUPROFEN 400 MG: 400 TABLET, FILM COATED ORAL at 00:26

## 2025-05-21 NOTE — PLAN OF CARE
Goal Outcome Evaluation:                 Patient had a bone marrow biopsy today. Patient had a fever at 1526. ID consulted. BC taken. CXR ordered.

## 2025-05-21 NOTE — PROGRESS NOTES
Hematology/Oncology Inpatient Progress Note    PATIENT NAME: Angella Santos  : 1960  MRN: [id number]    CHIEF COMPLAINT: Headache    HISTORY OF PRESENT ILLNESS:      2025: I was asked to see Ms. Santos who came to the hospital complaining of an intense headache. Headaches are relatively unusual for and never as long-lasting as this time. She had imaging of the brain that revealed no obvious abnormalities. In addition she had laboratory exams. A blood count reported leukopenia with mild neutropenia and lymphopenia. Hemoglobin and mean corpuscular volume were well within the normal range and she also had thrombocytopenia. I am asked to investigate. She is feeling better this morning although she persists with headache. In general, however, she has felt well. She reports being active and maintaining a stable weight. Her appetite is adequate and she has been afebrile and without nocturnal diaphoresis. She has had no respiratory symptoms, chest pains or abdominal pain. She has been taking regular bowel activity and has had no dysuria or hematuria. She has not started to take any medications in the recent past. She takes multiple supplements. She has no significant past medical history. On exam she is a slender well-built woman who is conversant and well-oriented. She does not seem acutely ill. She is neither jaundiced nor pale. No oral lesions. Respirations not labored and lungs clear bilaterally. Heart regular. Abdomen soft and without hepatomegaly or splenomegaly. No edema. Laboratory exams reviewed. Discussed with her. The changes appear to be rather acute and without an obvious explanation at this time. I have requested laboratory exams and she may need to have a bone marrow biopsy and aspiration. Discussed at length with her.     Subjective   2025: In tears.  Very severe headaches.  They are of brief duration but very intense.  Being followed by neurology.  ROS:  Review of Systems    Constitutional:  Negative for activity change, appetite change, chills, diaphoresis, fatigue, fever and unexpected weight change.   HENT:  Negative for congestion, dental problem, drooling, ear discharge, ear pain, facial swelling, hearing loss, mouth sores, nosebleeds, postnasal drip, rhinorrhea, sinus pressure, sinus pain, sneezing, sore throat, tinnitus, trouble swallowing and voice change.    Eyes:  Negative for photophobia, pain, discharge, redness, itching and visual disturbance.   Respiratory:  Negative for apnea, cough, choking, chest tightness, shortness of breath, wheezing and stridor.    Cardiovascular:  Negative for chest pain, palpitations and leg swelling.   Gastrointestinal:  Negative for abdominal distention, abdominal pain, anal bleeding, blood in stool, constipation, diarrhea, nausea, rectal pain and vomiting.   Endocrine: Negative for cold intolerance, heat intolerance, polydipsia and polyuria.   Genitourinary:  Negative for decreased urine volume, difficulty urinating, dysuria, flank pain, frequency, genital sores, hematuria and urgency.   Musculoskeletal:  Negative for arthralgias, back pain, gait problem, joint swelling, myalgias, neck pain and neck stiffness.   Skin:  Negative for color change, pallor and rash.   Neurological:  Positive for headaches. Negative for dizziness, tremors, seizures, syncope, facial asymmetry, speech difficulty, weakness, light-headedness and numbness.   Hematological:  Negative for adenopathy. Does not bruise/bleed easily.   Psychiatric/Behavioral:  Negative for agitation, behavioral problems, confusion, decreased concentration, hallucinations, self-injury, sleep disturbance and suicidal ideas. The patient is not nervous/anxious.         MEDICATIONS:    Scheduled Meds:  butalbital-acetaminophen-caffeine, 2 tablet, Oral, Q8H  levETIRAcetam, 500 mg, Oral, Q12H  pantoprazole, 40 mg, Oral, QAM AC       Continuous Infusions:      PRN Meds:    acetaminophen     "baclofen    Calcium Replacement - Follow Nurse / BPA Driven Protocol    gabapentin    ibuprofen    Magnesium Standard Dose Replacement - Follow Nurse / BPA Driven Protocol    melatonin    ondansetron ODT **OR** ondansetron    Phosphorus Replacement - Follow Nurse / BPA Driven Protocol    Potassium Replacement - Follow Nurse / BPA Driven Protocol    senna    [COMPLETED] Insert Peripheral IV **AND** sodium chloride     ALLERGIES:    Allergies   Allergen Reactions    Cephalexin Hives       Objective    VITALS:   /72 (BP Location: Left arm, Patient Position: Lying)   Pulse 87   Temp (!) 103.1 °F (39.5 °C) (Oral)   Resp 12   Ht 165.1 cm (65\")   Wt 56.6 kg (124 lb 12.5 oz)   SpO2 96%   BMI 20.76 kg/m²     PHYSICAL EXAM: (performed by MD)  Physical Exam  Constitutional:       General: She is not in acute distress.     Appearance: She is ill-appearing. She is not toxic-appearing or diaphoretic.   HENT:      Head: Normocephalic and atraumatic.      Right Ear: External ear normal.      Left Ear: External ear normal.      Nose: Nose normal.      Mouth/Throat:      Mouth: Mucous membranes are moist.      Pharynx: Oropharynx is clear. No oropharyngeal exudate or posterior oropharyngeal erythema.   Eyes:      General: No scleral icterus.        Right eye: No discharge.         Left eye: No discharge.      Conjunctiva/sclera: Conjunctivae normal.      Pupils: Pupils are equal, round, and reactive to light.   Cardiovascular:      Rate and Rhythm: Normal rate and regular rhythm.      Pulses: Normal pulses.      Heart sounds: No murmur heard.     No friction rub. No gallop.   Pulmonary:      Effort: No respiratory distress.      Breath sounds: No stridor. No wheezing, rhonchi or rales.   Abdominal:      General: Abdomen is flat. Bowel sounds are normal. There is no distension.      Palpations: Abdomen is soft. There is no mass.      Tenderness: There is no abdominal tenderness. There is no right CVA tenderness, left CVA " tenderness, guarding or rebound.      Hernia: No hernia is present.   Musculoskeletal:         General: No tenderness, deformity or signs of injury.      Cervical back: No rigidity.      Right lower leg: No edema.      Left lower leg: No edema.   Lymphadenopathy:      Cervical: No cervical adenopathy.   Skin:     Coloration: Skin is not jaundiced or pale.      Findings: No bruising, lesion or rash.   Neurological:      General: No focal deficit present.      Mental Status: She is alert and oriented to person, place, and time.      Cranial Nerves: No cranial nerve deficit.   Psychiatric:         Mood and Affect: Mood normal.         Behavior: Behavior normal.         Thought Content: Thought content normal.         Judgment: Judgment normal.     SERA George MD performed the physical exam on 5/21/2025 as documented above.    RECENT LABS:  Lab Results (last 24 hours)       Procedure Component Value Units Date/Time    Flow Cytometry ([facility]) [[id number]] Collected: 05/20/25 1302    Specimen: Blood Updated: 05/21/25 1243     Consult Global Result Comment^Text^TXT     Comment: Peripheral Blood:  No significant immunophenotypic abnormalities of myeloid   and lymphoid cells  DISCLAIMER: REFER TO HARDCOPY OR PDF FOR COMPLETE RESULT.   If synopsis provided, clinical decisions should not be   based on this interfaced synopsis alone.  Performed at:  [id number]  [address] [place] [address]  :  Dee Sarabia M.D., Ph.D., Phone:  [phone]       Narrative:      Performed at:   - [email or web address]  [address], [place] [address]  :  Dee Sarabia M.D., Ph.D., Phone:  [phone]    Cytogenetics ([facility]) [[id number]] Collected: 05/21/25 1121    Specimen: Bone Marrow from Iliac Crest, Left - Biopsy Updated: 05/21/25 1158    Flow Cytometry ([facility])  [[id number]] Collected: 05/21/25 1121    Specimen: Bone Marrow Aspirate from Iliac, Left Updated: 05/21/25 1158    Tissue Pathology Exam [[id number]] Collected: 05/21/25 1121    Specimen: Tissue from Iliac Crest, Left - Aspirate; Tissue from Iliac Crest, Left - Aspirate; Tissue from Iliac Crest, Left - Biopsy Updated: 05/21/25 1140    Lyme Antibody Total with Reflex ([facility]) [[id number]]  (Normal) Collected: 05/19/25 2210    Specimen: Blood from Arm, Right Updated: 05/21/25 0929     Lyme Ab IgG Negative    NATALIE [[id number]]  (Normal) Collected: 05/20/25 0323    Specimen: Blood from Arm, Left Updated: 05/21/25 0929     Antinuclear Antibodies (NATALIE) Negative    CBC & Differential [[id number]]  (Abnormal) Collected: 05/21/25 0551    Specimen: Blood from Arm, Left Updated: 05/21/25 0727    Narrative:      The following orders were created for panel order CBC & Differential.  Procedure                               Abnormality         Status                     ---------                               -----------         ------                     CBC Auto Differential[[id number]]        Abnormal            Final result               Scan Slide[[id number]]                                       Final result                 Please view results for these tests on the individual orders.    CBC Auto Differential [[id number]]  (Abnormal) Collected: 05/21/25 0551    Specimen: Blood from Arm, Left Updated: 05/21/25 0727     WBC 1.04 10*3/mm3      RBC 4.31 10*6/mm3      Hemoglobin 13.1 g/dL      Hematocrit 40.3 %      MCV 93.5 fL      MCH 30.4 pg      MCHC 32.5 g/dL      RDW 12.4 %      RDW-SD 43.1 fl      MPV 11.5 fL      Platelets 53 10*3/mm3     Scan Slide [[id number]] Collected: 05/21/25 0551    Specimen: Blood from Arm, Left Updated: 05/21/25 0727     Scan Slide --     Comment: See Manual Differential Results       Manual Differential [[id number]]  (Abnormal) Collected: 05/21/25 0551    Specimen: Blood from Arm, Left Updated: 05/21/25 0727      Neutrophil % 25.0 %      Lymphocyte % 16.0 %      Monocyte % 3.0 %      Bands %  40.0 %      Metamyelocyte % 11.0 %      Atypical Lymphocyte % 5.0 %      Neutrophils Absolute 0.68 10*3/mm3      Lymphocytes Absolute 0.22 10*3/mm3      Monocytes Absolute 0.03 10*3/mm3      Anisocytosis Slight/1+     WBC Morphology Normal     Platelet Estimate Decreased    Narrative:      Reviewed by Pathologist within the past 30 days on 052025 .      Comprehensive Metabolic Panel [[id number]]  (Abnormal) Collected: 05/21/25 0551    Specimen: Blood from Arm, Left Updated: 05/21/25 0702     Glucose 99 mg/dL      BUN 10 mg/dL      Creatinine 1.01 mg/dL      Sodium 137 mmol/L      Potassium 5.0 mmol/L      Chloride 103 mmol/L      CO2 24.1 mmol/L      Calcium 9.0 mg/dL      Total Protein 6.2 g/dL      Albumin 3.9 g/dL      ALT (SGPT) 175 U/L      AST (SGOT) 177 U/L      Alkaline Phosphatase 409 U/L      Total Bilirubin 0.8 mg/dL      Globulin 2.3 gm/dL      A/G Ratio 1.7 g/dL      BUN/Creatinine Ratio 9.9     Anion Gap 9.9 mmol/L      eGFR 61.9 mL/min/1.73     Narrative:      GFR Categories in Chronic Kidney Disease (CKD)              GFR Category          GFR (mL/min/1.73)    Interpretation  G1                    90 or greater        Normal or high (1)  G2                    60-89                Mild decrease (1)  G3a                   45-59                Mild to moderate decrease  G3b                   30-44                Moderate to severe decrease  G4                    15-29                Severe decrease  G5                    14 or less           Kidney failure    (1)In the absence of evidence of kidney disease, neither GFR category G1 or G2 fulfill the criteria for CKD.    eGFR calculation 2021 CKD-EPI creatinine equation, which does not include race as a factor          IMAGING REVIEWED:  CT Bone marrow biopsy and aspiration  Result Date: 5/21/2025  Impression: Technically successful CT-guided left posterior iliac 11-gauge  bone marrow aspiration and core biopsy, as described. Electronically Signed: Jp Torres MD  5/21/2025 2:46 PM EDT  Workstation ID: [id number]    MRI Brain With & Without Contrast  Result Date: 5/20/2025  Impression: 1. No acute intracranial abnormalities identified. 2. 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 Electronically Signed: Crispin Maxwell MD  5/20/2025 4:41 PM EDT  Workstation ID: [id number]    US Abdomen Limited  Result Date: 5/20/2025  Impression: 1. Mildly increased hepatic echogenicity may relate to underlying hepatic steatosis. 2. Spleen size normal with calculated splenic volume of 358 mL. 3. No biliary dilatation. Electronically Signed: Thomas Chin MD  5/20/2025 7:52 AM EDT  Workstation ID: [id number]    CT Head Without Contrast  Result Date: 5/19/2025  Impression: No acute intracranial abnormality. Electronically Signed: Pieter Palm DO  5/19/2025 9:11 PM EDT  Workstation ID: [id number]      Assessment & Plan   ASSESSMENT:  Leukopenia and thrombocytopenia: The counts have decreased further.  She might have mild iron deficiency though she neither has anemia nor microcytosis and the red cell distribution with is unremarkable, making significant iron deficiency less likely and most probably not an explanation for the bicytopenia.  Flow cytometry reveals no abnormal circulating cells.  Will obtain a bone marrow biopsy and aspiration.  Discussed with her.  Headaches: The MRI of the brain was reviewed.  No explanation.  She is being followed by neurology.    PLAN:  As above.    Josef George MD on 5/21/2025 at 4:29 PM.

## 2025-05-21 NOTE — CASE MANAGEMENT/SOCIAL WORK
Continued Stay Note  CHUCK Aly     Patient Name: Angella Santos  MRN: [id number]  Today's Date: 5/21/2025    Admit Date: 5/19/2025    Plan: Routine home with spouse.   Discharge Plan       Row Name 05/21/25 1359       Plan    Plan Comments Dc barriers: bone marrow biopsy today, elevated ALT/AST, WBC 1.04. Oncology and neurology following.                 Gayla Blackmon RN     Office phone: [phone]  Office fax: [phone]

## 2025-05-21 NOTE — PLAN OF CARE
Problem: Adult Inpatient Plan of Care  Goal: Plan of Care Review  Outcome: Progressing  Flowsheets (Taken 5/21/2025 0138)  Progress: no change  Outcome Evaluation: Pt resting in bed with call light within reach. prn pain medication given per pt request for headache. neurosurgery consulted to see pt in am. no questions or concerns at this time.  Plan of Care Reviewed With: patient  Goal: Patient-Specific Goal (Individualized)  Outcome: Progressing  Goal: Absence of Hospital-Acquired Illness or Injury  Outcome: Progressing  Intervention: Identify and Manage Fall Risk  Recent Flowsheet Documentation  Taken 5/21/2025 0009 by Tiffany Zarco RN  Safety Promotion/Fall Prevention: safety round/check completed  Taken 5/20/2025 2213 by Tiffany Zacro RN  Safety Promotion/Fall Prevention: safety round/check completed  Taken 5/20/2025 2105 by Tiffany Zarco RN  Safety Promotion/Fall Prevention: safety round/check completed  Intervention: Prevent Skin Injury  Recent Flowsheet Documentation  Taken 5/20/2025 2105 by Tiffany Zarco RN  Skin Protection: transparent dressing maintained  Intervention: Prevent and Manage VTE (Venous Thromboembolism) Risk  Recent Flowsheet Documentation  Taken 5/20/2025 2105 by Tiffany Zarco RN  VTE Prevention/Management: SCDs (sequential compression devices) off  Goal: Optimal Comfort and Wellbeing  Outcome: Progressing  Intervention: Monitor Pain and Promote Comfort  Recent Flowsheet Documentation  Taken 5/21/2025 0026 by Tiffany Zarco RN  Pain Management Interventions: pain medication given  Taken 5/20/2025 2105 by Tiffany Zarco RN  Pain Management Interventions: quiet environment facilitated  Intervention: Provide Person-Centered Care  Recent Flowsheet Documentation  Taken 5/20/2025 2105 by Tiffany Zarco RN  Trust Relationship/Rapport:   care explained   choices provided   emotional support provided  Goal: Readiness for Transition of Care  Outcome: Progressing     Problem:  Infection  Goal: Absence of Infection Signs and Symptoms  Outcome: Progressing   Goal Outcome Evaluation:  Plan of Care Reviewed With: patient        Progress: no change  Outcome Evaluation: Pt resting in bed with call light within reach. prn pain medication given per pt request for headache. neurosurgery consulted to see pt in am. no questions or concerns at this time.

## 2025-05-21 NOTE — H&P
[facility]   Interventional Radiology H&P    Patient Name: Angella Santos  : 1960  MRN: [id number]  Primary Care Physician:  Nikhil Maloney MD  Referring Physician: No Known Provider  Date of admission: 2025    Subjective   Subjective     HPI:  Angella Santos is a 65 y.o. female with Neutropenia and Thrombocytopenia    Review of Systems:   Constitutional no fever,  no weight loss       Otolaryngeal no difficulty swallowing   Cardiovascular no chest pain   Pulmonary no cough, no sputum production   Gastrointestinal no constipation, no diarrhea                         Personal History       Past Medical/Surgical History:   Past Medical History:   Diagnosis Date   • Degenerative joint disease of knee, left    • Fracture of right distal radius 2025    FELL ON ICE:  RIGHT LOWER ARM CAST, PAIN, LIMITED MOBILITY. SL SWELLING NOTED IN RIGHT FINGERS   • GERD (gastroesophageal reflux disease)    • History of kidney stones    • Osteoporosis      Past Surgical History:   Procedure Laterality Date   • COLONOSCOPY      Normal   • KIDNEY STONE SURGERY      LITHOTRIPSY STENT    • ORIF WRIST FRACTURE Right 2025    Procedure: RIGHT  DISTAL RADIUS  OPEN REDUCTION INTERNAL FIXATION;  Surgeon: Gaston Larry MD;  Location: [facility];  Service: Orthopedics;  Laterality: Right;       Social History:  reports that she has never smoked. She has never used smokeless tobacco. She reports current alcohol use of about 3.0 standard drinks of alcohol per week. She reports that she does not use drugs.    Medications:  Medications Prior to Admission   Medication Sig Dispense Refill Last Dose/Taking   • acetaminophen (TYLENOL) 500 MG tablet Take 2 tablets by mouth Every 6 (Six) Hours As Needed for Mild Pain.   2025 at  6:00 PM   • Calcium Citrate-Vitamin D3 (CITRACAL) 315-6.25 MG-MCG tablet tablet Take 2 tablets by mouth Daily. Patient stated she is taking calcium with vit d not just calcium    5/19/2025 at  9:00 AM   • Cyanocobalamin (B-12) 1000 MCG tablet Take 1 tablet by mouth Daily.   5/19/2025   • estradiol (ESTRACE) 0.1 MG/GM vaginal cream Use fingertip amount to vagina nightly for 2 weeks then 1-2 times weekly. 42.5 g 2 Past Week   • Magnesium 250 MG tablet Take 2 tablets by mouth Every Night.   5/19/2025 at  9:00 AM   • multivitamin with minerals tablet tablet Take 1 tablet by mouth Daily.   Past Week   • pantoprazole (Protonix) 20 MG EC tablet Take 1 tablet by mouth Daily. 90 tablet 3 5/19/2025   • Turmeric (QC TUMERIC COMPLEX PO) Take 1 tablet by mouth Daily. CHEWABLE  HOLD FOR SURGERY (Patient taking differently: Take 2 tablets by mouth Daily. CHEWABLE  HOLD FOR SURGERY)   5/18/2025 at  9:00 AM   • clobetasol propionate (TEMOVATE) 0.05 % cream Apply to external vulva 1-2 times weekly as needed. (Patient taking differently: Apply 1 Application topically to the appropriate area as directed As Needed.) 15 g 2 More than a month   • HYDROcodone-acetaminophen (NORCO) 5-325 MG per tablet Take 1 tablet by mouth Every 4 (Four) Hours As Needed for Severe Pain. 30 tablet 0    • zoledronic acid (Reclast) 5 MG/100ML solution infusion 100 mL 1 (One) Time. YEARLY  OSTEOPROSIS   11/1/2024     Current medications:  butalbital-acetaminophen-caffeine, 2 tablet, Oral, Q8H  pantoprazole, 40 mg, Oral, QAM AC      Current IV drips:       Allergies:  Allergies   Allergen Reactions   • Cephalexin Hives       Objective    Objective     Vitals:   Temp:  [98 °F (36.7 °C)-99.6 °F (37.6 °C)] 98 °F (36.7 °C)  Heart Rate:  [69-84] 84  Resp:  [12-19] 13  BP: ()/(61-82) 115/69      Physical Exam:   Constitutional: Awake, alert, No acute distress    Respiratory: Clear to auscultation bilaterally, nonlabored respirations    Cardiovascular: RRR, no murmurs, rubs, or gallops, palpable pedal pulses bilaterally   Gastrointestinal: Positive bowel sounds, soft, nontender, nondistended        ASA SCALE ASSESSMENT:  3-Severe  "systemic disease that results in functional limitation    MALLAMPATI CLASSIFICATION:          Result Review        Result Review:     Sodium   Date Value Ref Range Status   05/21/2025 137 136 - 145 mmol/L Final   05/19/2025 137 136 - 145 mmol/L Final   05/19/2025 132 (L) 136 - 145 mmol/L Final       Potassium   Date Value Ref Range Status   05/21/2025 5.0 3.5 - 5.2 mmol/L Final   05/19/2025 4.7 3.5 - 5.2 mmol/L Final   05/19/2025 4.4 3.5 - 5.2 mmol/L Final       Chloride   Date Value Ref Range Status   05/21/2025 103 98 - 107 mmol/L Final   05/19/2025 104 98 - 107 mmol/L Final   05/19/2025 99 98 - 107 mmol/L Final       No results found for: \"PLASMABICARB\"    BUN   Date Value Ref Range Status   05/21/2025 10 8 - 23 mg/dL Final   05/19/2025 13 8 - 23 mg/dL Final   05/19/2025 16 8 - 23 mg/dL Final       Creatinine   Date Value Ref Range Status   05/21/2025 1.01 (H) 0.57 - 1.00 mg/dL Final   05/19/2025 0.90 0.57 - 1.00 mg/dL Final   05/19/2025 0.99 0.57 - 1.00 mg/dL Final       Calcium   Date Value Ref Range Status   05/21/2025 9.0 8.6 - 10.5 mg/dL Final   05/19/2025 9.0 8.6 - 10.5 mg/dL Final   05/19/2025 9.2 8.6 - 10.5 mg/dL Final           No components found for: \"GLUCOSE.*\"  Results from last 7 days   Lab Units 05/21/25  0551   WBC 10*3/mm3 1.04*   HEMOGLOBIN g/dL 13.1   HEMATOCRIT % 40.3   PLATELETS 10*3/mm3 53*      Results from last 7 days   Lab Units 05/20/25  0323   INR  1.09           Assessment / Plan     Assesment:   Neutropenia and Thrombocytopenia      Plan:   Bone Marrow Biopsy    The risks and benefits of the procedure were discussed with the patient.    Electronically signed by Jp Torres MD, 05/21/25, 10:50 AM EDT.   "

## 2025-05-21 NOTE — PROGRESS NOTES
5/21/2025: Had a very significant temperature elevation. Has severe neutropenia with an absolute neutrophil count of less than 1000/mm3. Needs empiric antibiotic treatment. She's reported to be allergic to a cephalosporin. Will start treatment with meropenem. Discussed with her. Cultures have been obtained.     Josef George MD on 5/21/2025 at 17:48

## 2025-05-21 NOTE — PROGRESS NOTES
[facility] MEDICINE SERVICE  DAILY PROGRESS NOTE    NAME: Angella Santos  : 1960  MRN: [id number]      LOS: 2 days     PROVIDER OF SERVICE: Layo Tapia MD    Chief Complaint: Leukopenia    Subjective:     Interval History:  History taken from: patient chart RN  Patient Complaints:     - Still complaining of the same headache on the left occipital area that is burning/shocks comes for only few seconds and resolves  - Having too many episode of those shocks   - Did not respond very well to Neurontin however did okay with Fioricet, but was concerned about taking it because it has Tylenol  - Afebrile overnight   - Discussed imaging studies/workup with the patient      Objective:     Vital Signs  Temp:  [98 °F (36.7 °C)-99.6 °F (37.6 °C)] 98 °F (36.7 °C)  Heart Rate:  [69-84] 84  Resp:  [12-19] 13  BP: ()/(61-82) 115/69   Body mass index is 20.76 kg/m².    Physical Exam  General Appearance:  Alert, cooperative, no distress, appears stated age  Head:   Normocephalic, without obvious abnormality, atraumatic  Eyes:   PERRL, conjunctivae/corneas clear, EOM's intact, fundi benign, both eyes  Ears:    Normal TM's and external ear canals, both ears  Nose:   Nares normal, septum midline, mucosa normal, no drainage or sinus tenderness  Throat: Lips, mucosa, and tongue normal; teeth and gums normal  Neck:   Supple, symmetrical, trachea midline, no adenopathy, thyroid: not enlarged, symmetric, no tenderness/mass/nodules, no carotid bruit or JVD  Lungs:             Clear to auscultation bilaterally, respirations unlabored  Heart:  Regular rate and rhythm, S1, S2 normal, no murmur, rub or gallop  Abdomen:  Soft, nontender, bowel sounds active all four quadrants,  no masses, no organomegaly  Extremities:     Extremities normal, atraumatic, no cyanosis or edema  Pulses:            2+ and symmetric  Skin:    Skin color, texture, turgor normal, no rashes or lesions  Neurologic: Normal    Scheduled Meds    butalbital-acetaminophen-caffeine, 2 tablet, Oral, Q8H  pantoprazole, 40 mg, Oral, QAM AC       PRN Meds     baclofen    Calcium Replacement - Follow Nurse / BPA Driven Protocol    gabapentin    ibuprofen    Magnesium Standard Dose Replacement - Follow Nurse / BPA Driven Protocol    melatonin    ondansetron ODT **OR** ondansetron    Phosphorus Replacement - Follow Nurse / BPA Driven Protocol    Potassium Replacement - Follow Nurse / BPA Driven Protocol    senna    [COMPLETED] Insert Peripheral IV **AND** sodium chloride   Infusions         Diagnostic Data    Results from last 7 days   Lab Units 05/21/25  0551   WBC 10*3/mm3 1.04*   HEMOGLOBIN g/dL 13.1   HEMATOCRIT % 40.3   PLATELETS 10*3/mm3 53*   GLUCOSE mg/dL 99   CREATININE mg/dL 1.01*   BUN mg/dL 10   SODIUM mmol/L 137   POTASSIUM mmol/L 5.0   AST (SGOT) U/L 177*   ALT (SGPT) U/L 175*   ALK PHOS U/L 409*   BILIRUBIN mg/dL 0.8   ANION GAP mmol/L 9.9       MRI Brain With & Without Contrast  Result Date: 5/20/2025  Impression: 1. No acute intracranial abnormalities identified. 2. 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 Electronically Signed: Crispin Maxwell MD  5/20/2025 4:41 PM EDT  Workstation ID: [id number]    US Abdomen Limited  Result Date: 5/20/2025  Impression: 1. Mildly increased hepatic echogenicity may relate to underlying hepatic steatosis. 2. Spleen size normal with calculated splenic volume of 358 mL. 3. No biliary dilatation. Electronically Signed: Thomas Chin MD  5/20/2025 7:52 AM EDT  Workstation ID: [id number]    CT Head Without Contrast  Result Date: 5/19/2025  Impression: No acute intracranial abnormality. Electronically Signed: Pieter Palm DO  5/19/2025 9:11 PM EDT  Workstation ID: [id number]        I reviewed the patient's new clinical results.    Assessment/Plan:     Active and Resolved Problems  Active Hospital Problems    Diagnosis  POA    **Leukopenia [D72.819]  Yes      Resolved Hospital Problems   No resolved  problems to display.       Neutropenia  Thrombocytopenia   History of tick bite   head pain  Transaminitis    - Evaluated by hematology, workup was initiated  - Plan for bone marrow biopsy/aspirate  - Tick panel is pending  - Acute hepatitis are negative     -Evaluated by neurology, imaging studies are negative for acute process she was started on Neurontin and baclofen yesterday however did not have much response according to her she did better with Fioricet  - Her description of the pain clearly seems to be neuropathic  - Will keep same dose of baclofen  - Will try Neurontin 3 mg p.o. 3 times daily as needed    VTE Prophylaxis:  Mechanical VTE prophylaxis orders are present.         Code status is   Code Status and Medical Interventions: CPR (Attempt to Resuscitate); Limited Support; No artificial nutrition   Ordered at: 05/20/25 0116     Code Status (Patient has no pulse and is not breathing):    CPR (Attempt to Resuscitate)     Medical Interventions (Patient has pulse or is breathing):    Limited Support     Medical Intervention Limits:    No artificial nutrition       Plan for disposition: TBD     Time: 30 minutes    Signature: Electronically signed by Layo Tapia MD, 05/21/25, 10:59 EDT.  [facility] ([facility]

## 2025-05-21 NOTE — PROGRESS NOTES
Patient continues to have some pain    This could be an atypical trigeminal neuralgia type pain, clinically if this was an maxillary area then it would be classic trigeminal neuralgia V II vision so still this is a possibility this is trigeminal neuralgia her other medications are not really helping        1 classic medication used for this is oxcarbazepine or carbamazepine but her white count is low and I do not want to take a chance    I will start some Keppra    Her MRI looks okay, no mets or lesions of any kind reported    Discussed with the patient, her  and the patient's nurse

## 2025-05-22 ENCOUNTER — APPOINTMENT (OUTPATIENT)
Dept: CT IMAGING | Facility: HOSPITAL | Age: 65
DRG: 872 | End: 2025-05-22
Payer: MEDICARE

## 2025-05-22 LAB
ABO GROUP BLD: NORMAL
ALBUMIN SERPL-MCNC: 3.6 G/DL (ref 3.5–5.2)
ALBUMIN/GLOB SERPL: 1.8 G/DL
ALP SERPL-CCNC: 508 U/L (ref 39–117)
ALT SERPL W P-5'-P-CCNC: 361 U/L (ref 1–33)
ANION GAP SERPL CALCULATED.3IONS-SCNC: 7.9 MMOL/L (ref 5–15)
AST SERPL-CCNC: 425 U/L (ref 1–32)
BILIRUB SERPL-MCNC: 1 MG/DL (ref 0–1.2)
BLD GP AB SCN SERPL QL: NEGATIVE
BUN SERPL-MCNC: 12 MG/DL (ref 8–23)
BUN/CREAT SERPL: 12.8 (ref 7–25)
CALCIUM SPEC-SCNC: 8.1 MG/DL (ref 8.6–10.5)
CHLORIDE SERPL-SCNC: 99 MMOL/L (ref 98–107)
CMV IGG SERPL IA-ACNC: 6.2 U/ML (ref 0–0.59)
CMV IGM SERPL IA-ACNC: <30 AU/ML (ref 0–29.9)
CO2 SERPL-SCNC: 24.1 MMOL/L (ref 22–29)
CREAT SERPL-MCNC: 0.94 MG/DL (ref 0.57–1)
D-LACTATE SERPL-SCNC: 1.5 MMOL/L (ref 0.5–2)
DEPRECATED RDW RBC AUTO: 43 FL (ref 37–54)
EGFRCR SERPLBLD CKD-EPI 2021: 67.5 ML/MIN/1.73
ERYTHROCYTE [DISTWIDTH] IN BLOOD BY AUTOMATED COUNT: 12.5 % (ref 12.3–15.4)
GLOBULIN UR ELPH-MCNC: 2 GM/DL
GLUCOSE SERPL-MCNC: 123 MG/DL (ref 65–99)
HCT VFR BLD AUTO: 36.2 % (ref 34–46.6)
HGB BLD-MCNC: 11.7 G/DL (ref 12–15.9)
Lab: NORMAL
MCH RBC QN AUTO: 30.2 PG (ref 26.6–33)
MCHC RBC AUTO-ENTMCNC: 32.3 G/DL (ref 31.5–35.7)
MCV RBC AUTO: 93.5 FL (ref 79–97)
MRSA DNA SPEC QL NAA+PROBE: NORMAL
NRBC BLD AUTO-RTO: 0 /100 WBC (ref 0–0.2)
PLATELET # BLD AUTO: 37 10*3/MM3 (ref 140–450)
PMV BLD AUTO: 12.9 FL (ref 6–12)
POTASSIUM SERPL-SCNC: 3.8 MMOL/L (ref 3.5–5.2)
PROT SERPL-MCNC: 5.6 G/DL (ref 6–8.5)
RBC # BLD AUTO: 3.87 10*6/MM3 (ref 3.77–5.28)
RH BLD: POSITIVE
SODIUM SERPL-SCNC: 131 MMOL/L (ref 136–145)
T&S EXPIRATION DATE: NORMAL
WBC NRBC COR # BLD AUTO: 0.8 10*3/MM3 (ref 3.4–10.8)

## 2025-05-22 PROCEDURE — 86900 BLOOD TYPING SEROLOGIC ABO: CPT

## 2025-05-22 PROCEDURE — 25810000003 SODIUM CHLORIDE 0.9 % SOLUTION 250 ML FLEX CONT: Performed by: NURSE PRACTITIONER

## 2025-05-22 PROCEDURE — 25010000002 VANCOMYCIN HCL 1.25 G RECONSTITUTED SOLUTION 1 EACH VIAL: Performed by: NURSE PRACTITIONER

## 2025-05-22 PROCEDURE — 85025 COMPLETE CBC W/AUTO DIFF WBC: CPT | Performed by: NURSE PRACTITIONER

## 2025-05-22 PROCEDURE — 25010000002 MEROPENEM PER 100 MG: Performed by: INTERNAL MEDICINE

## 2025-05-22 PROCEDURE — 99232 SBSQ HOSP IP/OBS MODERATE 35: CPT | Performed by: INTERNAL MEDICINE

## 2025-05-22 PROCEDURE — 86901 BLOOD TYPING SEROLOGIC RH(D): CPT

## 2025-05-22 PROCEDURE — 87641 MR-STAPH DNA AMP PROBE: CPT | Performed by: NURSE PRACTITIONER

## 2025-05-22 PROCEDURE — 71250 CT THORAX DX C-: CPT

## 2025-05-22 PROCEDURE — 86850 RBC ANTIBODY SCREEN: CPT | Performed by: NURSE PRACTITIONER

## 2025-05-22 PROCEDURE — 80053 COMPREHEN METABOLIC PANEL: CPT

## 2025-05-22 PROCEDURE — 63710000001 ONDANSETRON ODT 4 MG TABLET DISPERSIBLE

## 2025-05-22 PROCEDURE — 86901 BLOOD TYPING SEROLOGIC RH(D): CPT | Performed by: NURSE PRACTITIONER

## 2025-05-22 PROCEDURE — 83605 ASSAY OF LACTIC ACID: CPT | Performed by: NURSE PRACTITIONER

## 2025-05-22 PROCEDURE — 25810000003 SODIUM CHLORIDE 0.9 % SOLUTION: Performed by: NURSE PRACTITIONER

## 2025-05-22 PROCEDURE — 86900 BLOOD TYPING SEROLOGIC ABO: CPT | Performed by: NURSE PRACTITIONER

## 2025-05-22 PROCEDURE — 74176 CT ABD & PELVIS W/O CONTRAST: CPT

## 2025-05-22 RX ORDER — BUTALBITAL, ACETAMINOPHEN AND CAFFEINE 50; 325; 40 MG/1; MG/1; MG/1
2 TABLET ORAL EVERY 8 HOURS PRN
Status: DISCONTINUED | OUTPATIENT
Start: 2025-05-22 | End: 2025-05-26 | Stop reason: HOSPADM

## 2025-05-22 RX ORDER — DOXYCYCLINE 100 MG/1
100 CAPSULE ORAL EVERY 12 HOURS SCHEDULED
Status: DISCONTINUED | OUTPATIENT
Start: 2025-05-22 | End: 2025-05-26 | Stop reason: HOSPADM

## 2025-05-22 RX ORDER — SODIUM CHLORIDE 9 MG/ML
100 INJECTION, SOLUTION INTRAVENOUS CONTINUOUS
Status: DISCONTINUED | OUTPATIENT
Start: 2025-05-22 | End: 2025-05-23

## 2025-05-22 RX ADMIN — MEROPENEM 1000 MG: 1 INJECTION INTRAVENOUS at 18:06

## 2025-05-22 RX ADMIN — LEVETIRACETAM 500 MG: 500 TABLET, FILM COATED ORAL at 21:12

## 2025-05-22 RX ADMIN — SODIUM CHLORIDE 1000 ML: 9 INJECTION, SOLUTION INTRAVENOUS at 05:00

## 2025-05-22 RX ADMIN — DOXYCYCLINE 100 MG: 100 CAPSULE ORAL at 12:22

## 2025-05-22 RX ADMIN — IBUPROFEN 400 MG: 400 TABLET, FILM COATED ORAL at 22:43

## 2025-05-22 RX ADMIN — IBUPROFEN 400 MG: 400 TABLET, FILM COATED ORAL at 16:02

## 2025-05-22 RX ADMIN — SODIUM CHLORIDE 100 ML/HR: 9 INJECTION, SOLUTION INTRAVENOUS at 06:29

## 2025-05-22 RX ADMIN — ONDANSETRON 4 MG: 4 TABLET, ORALLY DISINTEGRATING ORAL at 13:10

## 2025-05-22 RX ADMIN — SODIUM CHLORIDE 1250 MG: 9 INJECTION, SOLUTION INTRAVENOUS at 06:28

## 2025-05-22 RX ADMIN — LEVETIRACETAM 500 MG: 500 TABLET, FILM COATED ORAL at 08:15

## 2025-05-22 RX ADMIN — PANTOPRAZOLE SODIUM 40 MG: 40 TABLET, DELAYED RELEASE ORAL at 08:15

## 2025-05-22 RX ADMIN — MEROPENEM 1000 MG: 1 INJECTION INTRAVENOUS at 12:23

## 2025-05-22 RX ADMIN — SODIUM CHLORIDE 250 ML: 9 INJECTION, SOLUTION INTRAVENOUS at 01:18

## 2025-05-22 RX ADMIN — MEROPENEM 1000 MG: 1 INJECTION INTRAVENOUS at 03:12

## 2025-05-22 RX ADMIN — DOXYCYCLINE 100 MG: 100 CAPSULE ORAL at 22:43

## 2025-05-22 RX ADMIN — BUTALBITAL, ACETAMINOPHEN, AND CAFFEINE 2 TABLET: 325; 50; 40 TABLET ORAL at 04:36

## 2025-05-22 RX ADMIN — LEVETIRACETAM 500 MG: 500 TABLET, FILM COATED ORAL at 00:17

## 2025-05-22 NOTE — PROGRESS NOTES
Patient showing some signs of improvement but now has more diffuse headache  No neck stiffness  Her left paroxysmal thigh pain has improved ID consulted and working on her case and the possibility of Ehrlichiosis    Does not look like encephalitis or meningitis    Will follow-up

## 2025-05-22 NOTE — CONSULTS
Infectious Diseases Consult Note    Referring Provider: Layo Tapia MD    Reason for Consultation: Neutropenic fever    Patient Care Team:  Nikhil Maloney MD as PCP - Sherry Tom RN as Ambulatory  (Oncology) ([facility])    Chief complaint headache, weakness, fever, chills, nausea    Subjective     History of present illness:      This is a 65-year-old female presents to the hospital on 5/19/2025 with worsening headaches chills weakness fever nausea over the last several days.  Patient does admit to having a tach embedded to found a embedded tick on Mother's Day.  Denies any significant health history or being immunocompromise or having abnormal labs in the past.  Patient denies significant shortness of breath or cough, vomiting, diarrhea or urinary symptoms.  Patient currently does not have a rash.  There has been no neck pain or confusion    Review of Systems   Review of Systems   Constitutional:  Positive for chills, fatigue and fever.   Eyes: Negative.    Respiratory: Negative.     Cardiovascular: Negative.    Gastrointestinal:  Positive for nausea.   Endocrine: Negative.    Genitourinary: Negative.    Musculoskeletal: Negative.    Skin: Negative.    Neurological:  Positive for headaches.   Psychiatric/Behavioral: Negative.     All other systems reviewed and are negative.      Medications  Medications Prior to Admission   Medication Sig Dispense Refill Last Dose/Taking    acetaminophen (TYLENOL) 500 MG tablet Take 2 tablets by mouth Every 6 (Six) Hours As Needed for Mild Pain.   5/19/2025 at  6:00 PM    Calcium Citrate-Vitamin D3 (CITRACAL) 315-6.25 MG-MCG tablet tablet Take 2 tablets by mouth Daily. Patient stated she is taking calcium with vit d not just calcium   5/19/2025 at  9:00 AM    Cyanocobalamin (B-12) 1000 MCG tablet Take 1 tablet by mouth Daily.   5/19/2025    estradiol (ESTRACE) 0.1 MG/GM vaginal cream Use fingertip amount to vagina nightly for 2 weeks then 1-2  times weekly. 42.5 g 2 Past Week    Magnesium 250 MG tablet Take 2 tablets by mouth Every Night.   5/19/2025 at  9:00 AM    multivitamin with minerals tablet tablet Take 1 tablet by mouth Daily.   Past Week    pantoprazole (Protonix) 20 MG EC tablet Take 1 tablet by mouth Daily. 90 tablet 3 5/19/2025    Turmeric (QC TUMERIC COMPLEX PO) Take 1 tablet by mouth Daily. CHEWABLE  HOLD FOR SURGERY (Patient taking differently: Take 2 tablets by mouth Daily. CHEWABLE  HOLD FOR SURGERY)   5/18/2025 at  9:00 AM    clobetasol propionate (TEMOVATE) 0.05 % cream Apply to external vulva 1-2 times weekly as needed. (Patient taking differently: Apply 1 Application topically to the appropriate area as directed As Needed.) 15 g 2 More than a month    HYDROcodone-acetaminophen (NORCO) 5-325 MG per tablet Take 1 tablet by mouth Every 4 (Four) Hours As Needed for Severe Pain. 30 tablet 0     zoledronic acid (Reclast) 5 MG/100ML solution infusion 100 mL 1 (One) Time. YEARLY  OSTEOPROSIS   11/1/2024       History  Past Medical History:   Diagnosis Date    Degenerative joint disease of knee, left     Fracture of right distal radius 01/25/2025    FELL ON ICE:  RIGHT LOWER ARM CAST, PAIN, LIMITED MOBILITY. SL SWELLING NOTED IN RIGHT FINGERS    GERD (gastroesophageal reflux disease)     History of kidney stones     Osteoporosis      Past Surgical History:   Procedure Laterality Date    COLONOSCOPY  2019    Normal    KIDNEY STONE SURGERY  2012    LITHOTRIPSY STENT     ORIF WRIST FRACTURE Right 2/7/2025    Procedure: RIGHT  DISTAL RADIUS  OPEN REDUCTION INTERNAL FIXATION;  Surgeon: Gaston Larry MD;  Location: [facility];  Service: Orthopedics;  Laterality: Right;       Family History  Family History   Problem Relation Age of Onset    Arthritis Mother     Diabetes Mother     Heart disease Mother     Cancer Mother     Breast cancer Mother     Hypertension Father     Alcohol abuse Father     Early death Father     Kidney disease Sister      Diabetes Other     Hypertension Other     Hyperlipidemia Other     Cancer Other     Malig Hyperthermia Neg Hx        Social History   reports that she has never smoked. She has never used smokeless tobacco. She reports current alcohol use of about 3.0 standard drinks of alcohol per week. She reports that she does not use drugs.    Allergies  Cephalexin    Objective     Vital Signs   Vital Signs (last 24 hours)         05/21 0700  05/22 0659 05/22 0700  05/22 1606   Most Recent      Temp (°F) 98 -  103.1      99.2     99.2 (37.3) 05/22 1225    Heart Rate 66 -  87    66 -  83     83 05/22 1605    Resp 9 -  14      16     16 05/22 1225    BP 91/54 -  148/76    88/51 -  118/73     118/73 05/22 1605    SpO2 (%) 94 -  100    97 -  100     97 05/22 1605            Physical Exam:  Physical Exam  Vitals and nursing note reviewed.   Constitutional:       General: She is not in acute distress.     Appearance: She is well-developed and normal weight. She is ill-appearing. She is not diaphoretic.   HENT:      Head: Normocephalic and atraumatic.   Eyes:      General: No scleral icterus.     Extraocular Movements: Extraocular movements intact.      Conjunctiva/sclera: Conjunctivae normal.      Pupils: Pupils are equal, round, and reactive to light.   Cardiovascular:      Rate and Rhythm: Normal rate and regular rhythm.      Heart sounds: Normal heart sounds, S1 normal and S2 normal. No murmur heard.  Pulmonary:      Effort: Pulmonary effort is normal. No respiratory distress.      Breath sounds: Normal breath sounds. No stridor. No wheezing or rales.   Chest:      Chest wall: No tenderness.   Abdominal:      General: Bowel sounds are normal. There is no distension.      Palpations: Abdomen is soft. There is no mass.      Tenderness: There is no abdominal tenderness. There is no guarding.   Musculoskeletal:         General: No swelling, tenderness or deformity. Normal range of motion.      Cervical back: Neck supple.   Skin:      General: Skin is warm and dry.      Coloration: Skin is not pale.      Findings: No bruising, erythema or rash.   Neurological:      Mental Status: She is alert and oriented to person, place, and time.      Cranial Nerves: No cranial nerve deficit.   Psychiatric:         Mood and Affect: Mood normal.         Microbiology  Microbiology Results (last 10 days)       Procedure Component Value - Date/Time    MRSA Screen, PCR (Inpatient) - Swab, Nares [[id number]]  (Normal) Collected: 05/22/25 0817    Lab Status: Final result Specimen: Swab from Nares Updated: 05/22/25 1003     MRSA PCR No MRSA Detected    Narrative:      The negative predictive value of this diagnostic test is high and should only be used to consider de-escalating anti-MRSA therapy. A positive result may indicate colonization with MRSA and must be correlated clinically.    Respiratory Panel PCR w/COVID-19(SARS-CoV-2) JL/IVONNE/ZAYNAB/PAD/COR/STACIA In-House, NP Swab in UTM/VTM, 2 HR TAT - Swab, Nasopharynx [[id number]]  (Normal) Collected: 05/21/25 1709    Lab Status: Final result Specimen: Swab from Nasopharynx Updated: 05/21/25 1922     ADENOVIRUS, PCR Not Detected     Coronavirus 229E Not Detected     Coronavirus HKU1 Not Detected     Coronavirus NL63 Not Detected     Coronavirus OC43 Not Detected     COVID19 Not Detected     Human Metapneumovirus Not Detected     Human Rhinovirus/Enterovirus Not Detected     Influenza A PCR Not Detected     Influenza B PCR Not Detected     Parainfluenza Virus 1 Not Detected     Parainfluenza Virus 2 Not Detected     Parainfluenza Virus 3 Not Detected     Parainfluenza Virus 4 Not Detected     RSV, PCR Not Detected     Bordetella pertussis pcr Not Detected     Bordetella parapertussis PCR Not Detected     Chlamydophila pneumoniae PCR Not Detected     Mycoplasma pneumo by PCR Not Detected    Narrative:      In the setting of a positive respiratory panel with a viral infection PLUS a negative procalcitonin without other  underlying concern for bacterial infection, consider observing off antibiotics or discontinuation of antibiotics and continue supportive care. If the respiratory panel is positive for atypical bacterial infection (Bordetella pertussis, Chlamydophila pneumoniae, or Mycoplasma pneumoniae), consider antibiotic de-escalation to target atypical bacterial infection.            Laboratory  Results from last 7 days   Lab Units 05/22/25  0050   WBC 10*3/mm3 0.80*   HEMOGLOBIN g/dL 11.7*   HEMATOCRIT % 36.2   PLATELETS 10*3/mm3 37*     Results from last 7 days   Lab Units 05/22/25  0050   SODIUM mmol/L 131*   POTASSIUM mmol/L 3.8   CHLORIDE mmol/L 99   CO2 mmol/L 24.1   BUN mg/dL 12   CREATININE mg/dL 0.94   GLUCOSE mg/dL 123*   CALCIUM mg/dL 8.1*     Results from last 7 days   Lab Units 05/22/25  0050   SODIUM mmol/L 131*   POTASSIUM mmol/L 3.8   CHLORIDE mmol/L 99   CO2 mmol/L 24.1   BUN mg/dL 12   CREATININE mg/dL 0.94   GLUCOSE mg/dL 123*   CALCIUM mg/dL 8.1*                   Radiology  Imaging Results (Last 72 Hours)       Procedure Component Value Units Date/Time    CT Abdomen Pelvis Without Contrast [[id number]] Collected: 05/22/25 0814     Updated: 05/22/25 0825    Narrative:      CT ABDOMEN PELVIS WO CONTRAST, CT CHEST WO CONTRAST DIAGNOSTIC    Date of Exam: 5/22/2025 7:50 AM EDT    Indication: sepsis.    Comparison: Right upper quadrant abdominal ultrasound 5/20/2025. No prior CT chest for comparison. CT abdomen and pelvis without contrast 7/3/2012.    Technique: Axial CT images were obtained of the abdomen and pelvis without the administration of contrast. Sagittal and coronal reconstructions were performed.  Automated exposure control and iterative reconstruction methods were used.      Findings:    CHEST:  Mild reticular peripheral interstitial fibrotic changes are present. No acute airspace disease is seen. Benign calcified nodule is present within the right lower lobe. Heart size is normal. No significant  coronary calcifications. Benign calcified   mediastinal and right hilar lymph nodes. No pathologically large lymph nodes. No pericardial effusion or pleural effusion no acute osseous abnormalities identified. Mild chronic concavity of the superior endplates of T4 and T5.        ABDOMEN AND PELVIS:  Small nonobstructing bilateral renal stones are present, dominant in the left mid kidney measuring 4 mm. There is no ureteral stone or hydronephrosis. The liver, gallbladder, spleen, pancreas and adrenal glands are within normal limits. The bowel does   not appear abnormally thickened, dilated or inflamed. The appendix is normal. No adenopathy or free fluid is evident. Normal caliber of the abdominal aorta. Urinary bladder, uterus, rectum are unremarkable. No acute or suspicious osseous abnormalities.          Impression:      1.No acute findings within the chest, abdomen or pelvis.  2.Small nonobstructing bilateral renal stones.        Electronically Signed: Alejandra Snyder MD    5/22/2025 8:23 AM EDT    Workstation ID: [id number]    CT Chest Without Contrast Diagnostic [[id number]] Collected: 05/22/25 0814     Updated: 05/22/25 0825    Narrative:      CT ABDOMEN PELVIS WO CONTRAST, CT CHEST WO CONTRAST DIAGNOSTIC    Date of Exam: 5/22/2025 7:50 AM EDT    Indication: sepsis.    Comparison: Right upper quadrant abdominal ultrasound 5/20/2025. No prior CT chest for comparison. CT abdomen and pelvis without contrast 7/3/2012.    Technique: Axial CT images were obtained of the abdomen and pelvis without the administration of contrast. Sagittal and coronal reconstructions were performed.  Automated exposure control and iterative reconstruction methods were used.      Findings:    CHEST:  Mild reticular peripheral interstitial fibrotic changes are present. No acute airspace disease is seen. Benign calcified nodule is present within the right lower lobe. Heart size is normal. No significant coronary calcifications. Benign calcified    mediastinal and right hilar lymph nodes. No pathologically large lymph nodes. No pericardial effusion or pleural effusion no acute osseous abnormalities identified. Mild chronic concavity of the superior endplates of T4 and T5.        ABDOMEN AND PELVIS:  Small nonobstructing bilateral renal stones are present, dominant in the left mid kidney measuring 4 mm. There is no ureteral stone or hydronephrosis. The liver, gallbladder, spleen, pancreas and adrenal glands are within normal limits. The bowel does   not appear abnormally thickened, dilated or inflamed. The appendix is normal. No adenopathy or free fluid is evident. Normal caliber of the abdominal aorta. Urinary bladder, uterus, rectum are unremarkable. No acute or suspicious osseous abnormalities.          Impression:      1.No acute findings within the chest, abdomen or pelvis.  2.Small nonobstructing bilateral renal stones.        Electronically Signed: Alejandra Snyder MD    5/22/2025 8:23 AM EDT    Workstation ID: [id number]    XR Chest 1 View [[id number]] Collected: 05/21/25 1759     Updated: 05/21/25 1805    Narrative:      XR CHEST 1 VW    Date of Exam: 5/21/2025 5:52 PM EDT    Indication: fever    Comparison: 2/5/2025    FINDINGS:    The lungs are well-expanded. The heart and pulmonary vasculature are within normal limits. No pleural effusions are identified. There are no active appearing infiltrates. Scoliosis is present.      Impression:      No active disease.      Electronically Signed: Crispin Maxwell MD    5/21/2025 6:03 PM EDT    Workstation ID: [id number]    CT Bone marrow biopsy and aspiration [[id number]] Collected: 05/21/25 1356     Updated: 05/21/25 1448    Narrative:      CT BONE MARROW ASPIRATION AND BIOPSY    Date of Exam: 5/21/2025 10:57 AM EDT    Indication: Neutropenia and thrombocytopenia.    Comparison: None available.    Technique: Following a detailed explanation of the procedures risks and benefits, the patient understood and written  informed consent was provided. She was placed onto the radiographic table in the prone position with a timeout performed. Her left   posterior ilium was localized with CT and skin site marked then prepped and draped using maximum sterile barrier technique. After obtaining adequate local anesthesia using 1% lidocaine, the posterior left ilium was cannulated using an 11-gauge bone   biopsy needle and strict CT guidance. Initial liquid marrow aspirate samples were obtained and submitted after which the cannula was advanced approximately 3 cm into the marrow space, then removed, yielding a core specimen. A sterile occlusive dressing   was placed externally following manual hemostasis. The patient tolerated the procedure well and there were no immediate complications.      Fluoroscopic Time: 21 seconds (CTDI 171 mGy)    Monitored moderate conscious sedation time: 10 minutes    Findings:  CT images confirm appropriate positioning of the needle during the biopsy procedure.      Impression:      Impression:  Technically successful CT-guided left posterior iliac 11-gauge bone marrow aspiration and core biopsy, as described.      Electronically Signed: Jp Torres MD    5/21/2025 2:46 PM EDT    Workstation ID: [id number]    MRI Brain With & Without Contrast [[id number]] Collected: 05/20/25 1627     Updated: 05/20/25 1643    Narrative:      MRI BRAIN W WO CONTRAST    Date of Exam: 5/20/2025 3:58 PM EDT    Indication: Headaches, history of cancer.     Comparison: CT scan of the head 5/19/2025    Technique:  Routine multiplanar/multisequence sequence images of the brain were obtained before and after the uneventful administration of Prohance.      Findings:  The ventricles have a normal size and configuration. No evidence of acute intracranial hemorrhage, mass or midline shift. No evidence of acute ischemia on diffusion weighted images. There are minimal chronic appearing changes in the white matter   consistent with the  patient's age. No evidence of demyelinating disease. There are no areas of abnormal contrast enhancement. Moderate degenerative changes are present in the temporomandibular joints. The paranasal sinuses and mastoid air cells are   clear.      Impression:      Impression:    1. No acute intracranial abnormalities identified.    2. 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        Electronically Signed: Crispin Maxwell MD    5/20/2025 4:41 PM EDT    Workstation ID: [id number]    US Abdomen Limited [[id number]] Collected: 05/20/25 0747     Updated: 05/20/25 0754    Narrative:      US ABDOMEN LIMITED    Date of Exam: 5/20/2025 4:00 AM EDT    Indication: thrombocytopenia.    Comparison: No comparisons available.    Technique: Grayscale and color Doppler ultrasound evaluation of the right upper quadrant was performed.      Findings:  The background liver echogenicity is mildly increased. There is no discrete liver lesion. No perihepatic ascites. There is hepatopetal flow in the portal vein. The visualized hepatic veins are patent.     Normal caliber common bile duct measuring 4 mm. Gallbladder partially imaged without discrete cholelithiasis although study not optimized for gallbladder assessment.    Right kidney measures 10.5 x 4.5 x 5.3 cm. The left kidney measures 10.4 x 4.8 x 5.5 cm. Corticomedullary differentiation maintained. Negative for hydronephrosis.    The spleen measures 10.9 x 11.7 x 5.4 cm. Calculated splenic volume of 358 mL.      Impression:      Impression:  1. Mildly increased hepatic echogenicity may relate to underlying hepatic steatosis.  2. Spleen size normal with calculated splenic volume of 358 mL.  3. No biliary dilatation.              Electronically Signed: Thomas Chin MD    5/20/2025 7:52 AM EDT    Workstation ID: [id number]    CT Head Without Contrast [[id number]] Collected: 05/19/25 2110     Updated: 05/19/25 2113    Narrative:      CT HEAD WO CONTRAST    Date of Exam: 5/19/2025  8:34 PM EDT    Indication: headache.    Comparison: None    Technique: Axial CT images were obtained of the head without contrast administration.  Coronal reconstructions were performed.  Automated exposure control and iterative reconstruction methods were used.      Findings:  No large territory infarct.    There is no evidence of hemorrhage.  No mass effect, edema or midline shift    Unremarkable white matter    No extra-axial fluid collection.    The ventricles are normal in size and configuration.    The visualized orbits are unremarkable.  The visualized paranasal sinuses and mastoid air cells are clear.    The visualized soft tissues are unremarkable.  No acute osseous abnormality.      Impression:      Impression:  No acute intracranial abnormality.        Electronically Signed: Pieter Palm DO    5/19/2025 9:11 PM EDT    Workstation ID: [id number]            Cardiology      Results Review:  I have reviewed all clinical data, test, lab, and imaging results.       Schedule Meds  doxycycline, 100 mg, Oral, Q12H  levETIRAcetam, 500 mg, Oral, Q12H  meropenem, 1,000 mg, Intravenous, Q8H  pantoprazole, 40 mg, Oral, QAM AC        Infusion Meds  sodium chloride, 100 mL/hr, Last Rate: 100 mL/hr (05/22/25 0629)        PRN Meds    acetaminophen    baclofen    butalbital-acetaminophen-caffeine    Calcium Replacement - Follow Nurse / BPA Driven Protocol    gabapentin    ibuprofen    Magnesium Standard Dose Replacement - Follow Nurse / BPA Driven Protocol    melatonin    ondansetron ODT **OR** ondansetron    Phosphorus Replacement - Follow Nurse / BPA Driven Protocol    Potassium Replacement - Follow Nurse / BPA Driven Protocol    senna    [COMPLETED] Insert Peripheral IV **AND** sodium chloride      Assessment & Plan       Assessment    Febrile illness associated with pancytopenia and elevated transaminase with tick exposure.  Presentation is highly concerning for tickborne infection such as ehrlichiosis.  Other  possibility will be viral infection such as CMV or Frank-Barr virus infection.  Malignancy is in the differential diagnosis but less likely.    Severe neutropenia.  The patient is not known to be on immunosuppressive therapy.  The patient is being followed by hematology service and status post bone marrow biopsy    Plan    Continue p.o. doxycycline 100 mg twice daily.  Infectious disease service started doxycycline yesterday    Continue IV meropenem since patient had neutropenic fever.  Can discontinue when neutropenia resolved    Serum for Ehrlichia PCR  CMV and Frank-Barr virus serology and quantitative PCR    Discontinue IV vancomycin    Continue supportive care  A.m. labs    Case discussed with neurology.  Patient has no clinical signs or symptoms of meningitis or encephalitis          Shayla Gonsalves, APRN  05/22/25  16:06 EDT    Note is dictated utilizing voice recognition software/Dragon

## 2025-05-22 NOTE — PROGRESS NOTES
Hematology/Oncology Inpatient Progress Note    PATIENT NAME: Angella Santos  : 1960  MRN: [id number]    CHIEF COMPLAINT: Headache    HISTORY OF PRESENT ILLNESS:      2025: I was asked to see Ms. Santos who came to the hospital complaining of an intense headache. Headaches are relatively unusual for and never as long-lasting as this time. She had imaging of the brain that revealed no obvious abnormalities. In addition she had laboratory exams. A blood count reported leukopenia with mild neutropenia and lymphopenia. Hemoglobin and mean corpuscular volume were well within the normal range and she also had thrombocytopenia. I am asked to investigate. She is feeling better this morning although she persists with headache. In general, however, she has felt well. She reports being active and maintaining a stable weight. Her appetite is adequate and she has been afebrile and without nocturnal diaphoresis. She has had no respiratory symptoms, chest pains or abdominal pain. She has been taking regular bowel activity and has had no dysuria or hematuria. She has not started to take any medications in the recent past. She takes multiple supplements. She has no significant past medical history. On exam she is a slender well-built woman who is conversant and well-oriented. She does not seem acutely ill. She is neither jaundiced nor pale. No oral lesions. Respirations not labored and lungs clear bilaterally. Heart regular. Abdomen soft and without hepatomegaly or splenomegaly. No edema. Laboratory exams reviewed. Discussed with her. The changes appear to be rather acute and without an obvious explanation at this time. I have requested laboratory exams and she may need to have a bone marrow biopsy and aspiration. Discussed at length with her.     Subjective   2025.  Remained afebrile until earlier today. She had another temperature elevation though not as high as the first 1.  She continues to have a headache  that is somewhat improved.  Ibuprofen seems to provide the most relief.  No bleeding.  ROS:  Review of Systems   Constitutional:  Negative for activity change, appetite change, chills, diaphoresis, fatigue, fever and unexpected weight change.   HENT:  Negative for congestion, dental problem, drooling, ear discharge, ear pain, facial swelling, hearing loss, mouth sores, nosebleeds, postnasal drip, rhinorrhea, sinus pressure, sinus pain, sneezing, sore throat, tinnitus, trouble swallowing and voice change.    Eyes:  Negative for photophobia, pain, discharge, redness, itching and visual disturbance.   Respiratory:  Negative for apnea, cough, choking, chest tightness, shortness of breath, wheezing and stridor.    Cardiovascular:  Negative for chest pain, palpitations and leg swelling.   Gastrointestinal:  Negative for abdominal distention, abdominal pain, anal bleeding, blood in stool, constipation, diarrhea, nausea, rectal pain and vomiting.   Endocrine: Negative for cold intolerance, heat intolerance, polydipsia and polyuria.   Genitourinary:  Negative for decreased urine volume, difficulty urinating, dysuria, flank pain, frequency, genital sores, hematuria and urgency.   Musculoskeletal:  Negative for arthralgias, back pain, gait problem, joint swelling, myalgias, neck pain and neck stiffness.   Skin:  Negative for color change, pallor and rash.   Neurological:  Positive for headaches. Negative for dizziness, tremors, seizures, syncope, facial asymmetry, speech difficulty, weakness, light-headedness and numbness.   Hematological:  Negative for adenopathy. Does not bruise/bleed easily.   Psychiatric/Behavioral:  Negative for agitation, behavioral problems, confusion, decreased concentration, hallucinations, self-injury, sleep disturbance and suicidal ideas. The patient is not nervous/anxious.       MEDICATIONS:    Scheduled Meds:  doxycycline, 100 mg, Oral, Q12H  levETIRAcetam, 500 mg, Oral, Q12H  meropenem, 1,000 mg,  "Intravenous, Q8H  pantoprazole, 40 mg, Oral, QAM AC       Continuous Infusions:  sodium chloride, 100 mL/hr, Last Rate: 100 mL/hr (05/22/25 0629)       PRN Meds:    acetaminophen    baclofen    butalbital-acetaminophen-caffeine    Calcium Replacement - Follow Nurse / BPA Driven Protocol    gabapentin    ibuprofen    Magnesium Standard Dose Replacement - Follow Nurse / BPA Driven Protocol    melatonin    ondansetron ODT **OR** ondansetron    Phosphorus Replacement - Follow Nurse / BPA Driven Protocol    Potassium Replacement - Follow Nurse / BPA Driven Protocol    senna    [COMPLETED] Insert Peripheral IV **AND** sodium chloride     ALLERGIES:    Allergies   Allergen Reactions    Cephalexin Hives       Objective    VITALS:   /73 (BP Location: Left arm, Patient Position: Lying)   Pulse 83   Temp (!) 101.2 °F (38.4 °C) (Oral)   Resp 16   Ht 165.1 cm (65\")   Wt 56.6 kg (124 lb 12.5 oz)   SpO2 97%   BMI 20.76 kg/m²     PHYSICAL EXAM: (performed by MD)  Physical Exam  Constitutional:       General: She is not in acute distress.     Appearance: She is ill-appearing. She is not toxic-appearing or diaphoretic.   HENT:      Head: Normocephalic and atraumatic.      Right Ear: External ear normal.      Left Ear: External ear normal.      Nose: Nose normal.      Mouth/Throat:      Mouth: Mucous membranes are moist.      Pharynx: Oropharynx is clear. No oropharyngeal exudate or posterior oropharyngeal erythema.   Eyes:      General: No scleral icterus.        Right eye: No discharge.         Left eye: No discharge.      Conjunctiva/sclera: Conjunctivae normal.      Pupils: Pupils are equal, round, and reactive to light.   Cardiovascular:      Rate and Rhythm: Normal rate and regular rhythm.      Pulses: Normal pulses.      Heart sounds: No murmur heard.     No friction rub. No gallop.   Pulmonary:      Effort: No respiratory distress.      Breath sounds: No stridor. No wheezing, rhonchi or rales.   Abdominal:      " General: Abdomen is flat. Bowel sounds are normal. There is no distension.      Palpations: Abdomen is soft. There is no mass.      Tenderness: There is no abdominal tenderness. There is no right CVA tenderness, left CVA tenderness, guarding or rebound.      Hernia: No hernia is present.   Musculoskeletal:         General: No tenderness, deformity or signs of injury.      Cervical back: No rigidity.      Right lower leg: No edema.      Left lower leg: No edema.   Lymphadenopathy:      Cervical: No cervical adenopathy.   Skin:     Coloration: Skin is not jaundiced or pale.      Findings: No bruising, lesion or rash.   Neurological:      General: No focal deficit present.      Mental Status: She is alert and oriented to person, place, and time.      Cranial Nerves: No cranial nerve deficit.   Psychiatric:         Mood and Affect: Mood normal.         Behavior: Behavior normal.         Thought Content: Thought content normal.         Judgment: Judgment normal.     SERA George MD performed the physical exam on 5/22/2025 as documented above.    RECENT LABS:  Lab Results (last 24 hours)       Procedure Component Value Units Date/Time    Blood Culture - Blood, Arm, Left [[id number]]  (Normal) Collected: 05/21/25 1706    Specimen: Blood from Arm, Left Updated: 05/22/25 1831     Blood Culture No growth at 24 hours    Blood Culture - Blood, Arm, Left [[id number]]  (Normal) Collected: 05/21/25 1805    Specimen: Blood from Arm, Left Updated: 05/22/25 1831     Blood Culture No growth at 24 hours    Flow Cytometry ([facility]) [[id number]] Collected: 05/21/25 1121    Specimen: Bone Marrow Aspirate from Iliac, Left Updated: 05/22/25 1340     Consult Global Result Comment^Text^TXT     Comment: Bone Marrow, Lt Iliac Crest:  No significant immunophenotypic abnormalities of myeloid   and lymphoid cells  DISCLAIMER: REFER TO HARDCOPY OR PDF FOR COMPLETE RESULT.   If synopsis provided, clinical decisions should not be    based on this interfaced synopsis alone.  Performed at:  1 - [facility]  [address], [place] [address]  :  Dee Sarabia M.D., Ph.D., Phone:  [phone]       Narrative:      Performed at:  1 - [facility]  [address] [id number]  :  Dee Sarabia M.D., Ph.D., Phone:  [phone]    MRSA Screen, PCR (Inpatient) - Swab, Nares [[id number]]  (Normal) Collected: 05/22/25 0817    Specimen: Swab from Nares Updated: 05/22/25 1003     MRSA PCR No MRSA Detected    Narrative:      The negative predictive value of this diagnostic test is high and should only be used to consider de-escalating anti-MRSA therapy. A positive result may indicate colonization with MRSA and must be correlated clinically.    CMV IgG IgM [[id number]]  (Abnormal) Collected: 05/21/25 1805    Specimen: Blood Updated: 05/22/25 0812     CMV IgG 6.20 U/mL      Comment:                                Negative          <0.60                                 Equivocal   0.60 - 0.69                                 Positive          >0.69        CMV IgM <30.0 AU/mL      Comment:                                 Negative         <30.0                                  Equivocal  30.0 - 34.9                                  Positive         >34.9  A positive result is generally indicative of acute  infection, reactivation or persistent IgM production.       Narrative:      Performed at:  [address] [place] [address]  [id number]  : Miles Stanley PhD, Phone:  [phone]    Lactic Acid, Plasma [[id number]]  (Normal) Collected: 05/22/25 0429    Specimen: Blood from Arm, Left Updated: 05/22/25 0502     Lactate 1.5 mmol/L     Comprehensive Metabolic Panel [[id number]]  (Abnormal) Collected: 05/22/25 0050    Specimen: Blood from Hand, Right Updated: 05/22/25 0133     Glucose 123 mg/dL      BUN 12  mg/dL      Creatinine 0.94 mg/dL      Sodium 131 mmol/L      Potassium 3.8 mmol/L      Chloride 99 mmol/L      CO2 24.1 mmol/L      Calcium 8.1 mg/dL      Total Protein 5.6 g/dL      Albumin 3.6 g/dL      ALT (SGPT) 361 U/L      AST (SGOT) 425 U/L      Alkaline Phosphatase 508 U/L      Total Bilirubin 1.0 mg/dL      Globulin 2.0 gm/dL      A/G Ratio 1.8 g/dL      BUN/Creatinine Ratio 12.8     Anion Gap 7.9 mmol/L      eGFR 67.5 mL/min/1.73     Narrative:      GFR Categories in Chronic Kidney Disease (CKD)              GFR Category          GFR (mL/min/1.73)    Interpretation  G1                    90 or greater        Normal or high (1)  G2                    60-89                Mild decrease (1)  G3a                   45-59                Mild to moderate decrease  G3b                   30-44                Moderate to severe decrease  G4                    15-29                Severe decrease  G5                    14 or less           Kidney failure    (1)In the absence of evidence of kidney disease, neither GFR category G1 or G2 fulfill the criteria for CKD.    eGFR calculation 2021 CKD-EPI creatinine equation, which does not include race as a factor    CBC & Differential [[id number]]  (Abnormal) Collected: 05/22/25 0050    Specimen: Blood from Hand, Right Updated: 05/22/25 0122    Narrative:      The following orders were created for panel order CBC & Differential.  Procedure                               Abnormality         Status                     ---------                               -----------         ------                     CBC Auto Differential[[id number]]        Abnormal            Final result               Scan Slide[[id number]]                                                                    Please view results for these tests on the individual orders.    CBC Auto Differential [[id number]]  (Abnormal) Collected: 05/22/25 0050    Specimen: Blood from Hand, Right Updated: 05/22/25 0122     WBC  "0.80 10*3/mm3      RBC 3.87 10*6/mm3      Hemoglobin 11.7 g/dL      Hematocrit 36.2 %      MCV 93.5 fL      MCH 30.2 pg      MCHC 32.3 g/dL      RDW 12.5 %      RDW-SD 43.0 fl      MPV 12.9 fL      Platelets 37 10*3/mm3      nRBC 0.0 /100 WBC     Procalcitonin [[id number]]  (Normal) Collected: 05/21/25 1805    Specimen: Blood Updated: 05/21/25 2038     Procalcitonin 0.19 ng/mL     Narrative:      As a Marker for Sepsis (Non-Neonates):    1. <0.5 ng/mL represents a low risk of severe sepsis and/or septic shock.  2. >2 ng/mL represents a high risk of severe sepsis and/or septic shock.    As a Marker for Lower Respiratory Tract Infections that require antibiotic therapy:    PCT on Admission    Antibiotic Therapy       6-12 Hrs later    >0.5                Strongly Recommended  >0.25 - <0.5        Recommended   0.1 - 0.25          Discouraged              Remeasure/reassess PCT  <0.1                Strongly Discouraged     Remeasure/reassess PCT    As 28 day mortality risk marker: \"Change in Procalcitonin Result\" (>80% or <=80%) if Day 0 (or Day 1) and Day 4 values are available. Refer to http://www.[employer]-pct-calculator.com    Change in PCT <=80%  A decrease of PCT levels below or equal to 80% defines a positive change in PCT test result representing a higher risk for 28-day all-cause mortality of patients diagnosed with severe sepsis for septic shock.    Change in PCT >80%  A decrease of PCT levels of more than 80% defines a negative change in PCT result representing a lower risk for 28-day all-cause mortality of patients diagnosed with severe sepsis or septic shock.       Urinalysis With Culture If Indicated - Urine, Clean Catch [[id number]]  (Abnormal) Collected: 05/21/25 1940    Specimen: Urine, Clean Catch Updated: 05/21/25 2002     Color, UA Dark Yellow     Appearance, UA Clear     pH, UA 6.0     Specific Gravity, UA 1.018     Glucose, UA Negative     Ketones, UA Trace     Bilirubin, UA Small (1+)     Comment: " Confirmation testing is unavailable.  A serum bilirubin is recommended for further assessment.        Blood, UA Negative     Protein, UA 30 mg/dL (1+)     Leuk Esterase, UA Trace     Nitrite, UA Negative     Urobilinogen, UA 1.0 E.U./dL    Narrative:      In absence of clinical symptoms, the presence of pyuria, bacteria, and/or nitrites on the urinalysis result does not correlate with infection.    Urinalysis, Microscopic Only - Urine, Clean Catch [[id number]] Collected: 05/21/25 1940    Specimen: Urine, Clean Catch Updated: 05/21/25 2000     RBC, UA 0-2 /HPF      WBC, UA 0-2 /HPF      Comment: Urine culture not indicated.        Bacteria, UA None Seen /HPF      Squamous Epithelial Cells, UA 0-2 /HPF      Hyaline Casts, UA None Seen /LPF      Methodology Automated Microscopy    Respiratory Panel PCR w/COVID-19(SARS-CoV-2) [facility]ONNE/ZAYNAB/[facility] In-House, NP Swab in UTM/VTM, 2 HR TAT - Swab, Nasopharynx [[id number]]  (Normal) Collected: 05/21/25 1709    Specimen: Swab from Nasopharynx Updated: 05/21/25 1922     ADENOVIRUS, PCR Not Detected     Coronavirus 229E Not Detected     Coronavirus HKU1 Not Detected     Coronavirus NL63 Not Detected     Coronavirus OC43 Not Detected     COVID19 Not Detected     Human Metapneumovirus Not Detected     Human Rhinovirus/Enterovirus Not Detected     Influenza A PCR Not Detected     Influenza B PCR Not Detected     Parainfluenza Virus 1 Not Detected     Parainfluenza Virus 2 Not Detected     Parainfluenza Virus 3 Not Detected     Parainfluenza Virus 4 Not Detected     RSV, PCR Not Detected     Bordetella pertussis pcr Not Detected     Bordetella parapertussis PCR Not Detected     Chlamydophila pneumoniae PCR Not Detected     Mycoplasma pneumo by PCR Not Detected    Narrative:      In the setting of a positive respiratory panel with a viral infection PLUS a negative procalcitonin without other underlying concern for bacterial infection, consider observing off antibiotics or  discontinuation of antibiotics and continue supportive care. If the respiratory panel is positive for atypical bacterial infection (Bordetella pertussis, Chlamydophila pneumoniae, or Mycoplasma pneumoniae), consider antibiotic de-escalation to target atypical bacterial infection.          IMAGING REVIEWED:  CT Abdomen Pelvis Without Contrast  Result Date: 5/22/2025  1.No acute findings within the chest, abdomen or pelvis. 2.Small nonobstructing bilateral renal stones. Electronically Signed: Alejandra Snyder MD  5/22/2025 8:23 AM EDT  Workstation ID: [id number]    CT Chest Without Contrast Diagnostic  Result Date: 5/22/2025  1.No acute findings within the chest, abdomen or pelvis. 2.Small nonobstructing bilateral renal stones. Electronically Signed: Alejandra Snyder MD  5/22/2025 8:23 AM EDT  Workstation ID: [id number]    XR Chest 1 View  Result Date: 5/21/2025  No active disease. Electronically Signed: Crispin Maxwell MD  5/21/2025 6:03 PM EDT  Workstation ID: [id number]    CT Bone marrow biopsy and aspiration  Result Date: 5/21/2025  Impression: Technically successful CT-guided left posterior iliac 11-gauge bone marrow aspiration and core biopsy, as described. Electronically Signed: Jp Torres MD  5/21/2025 2:46 PM EDT  Workstation ID: [id number]      Assessment & Plan   ASSESSMENT:  Leukopenia and thrombocytopenia: Ehrlichiosis?  The reduction in the blood count seems out of proportion to usual.  No fever, no malaise and no rash.  The headaches would be atypical.  On this basis it seems doubtful.  She is on empiric doxycycline.  Will follow closely.  Headaches: With some improvement.  Continue same.    PLAN:  As above.    Josef George MD on 5/22/2025 at 1909.

## 2025-05-22 NOTE — PROGRESS NOTES
"Pharmacy Antimicrobial Dosing Service    Subjective:  Angella Santos is a 65 y.o.female admitted with leukopenia. Pharmacy has been consulted to dose Vancomycin for possible sepsis, empirically.      Assessment/Plan    1. Day #1/7 Vancomycin: Goal -600 mcg*h/mL. Will give 1250mg (~22mg/kg ABW) IV once followed by 750mg (~13mg/kg ABW) IV q12h for a predicted AUC ~587mcg*h/mL. Will obtain random level on 5/23 at 1400 between third and fourth dose, or sooner if clinically indicated.     2. Day #1/5 Meropenem: 1g IV q8h for estCrCl > 50 mL/min.    3. MRSA: Pending.     Will continue to monitor drug levels, renal function, culture and sensitivities, and patient clinical status.       Objective:  Relevant clinical data and objective history reviewed:  165.1 cm (65\")   56.6 kg (124 lb 12.5 oz)   Ideal body weight: 57 kg (125 lb 10.6 oz)  Body mass index is 20.76 kg/m².        Results from last 7 days   Lab Units 05/22/25  0050 05/21/25  0551 05/19/25  2353   CREATININE mg/dL 0.94 1.01* 0.90     Estimated Creatinine Clearance: 53.3 mL/min (by C-G formula based on SCr of 0.94 mg/dL).  I/O last 3 completed shifts:  In: 480 [P.O.:480]  Out: -     Results from last 7 days   Lab Units 05/22/25  0050 05/21/25  0551 05/19/25  2353   WBC 10*3/mm3 0.80* 1.04* 1.53*     Temperature    05/22/25 0013 05/22/25 0122 05/22/25 0313   Temp: 98.3 °F (36.8 °C) 99 °F (37.2 °C) 98.8 °F (37.1 °C)     Baseline culture/source/susceptibility:  Microbiology Results (last 10 days)       Procedure Component Value - Date/Time    Respiratory Panel PCR w/COVID-19(SARS-CoV-2) [facility]NAB/[facility] In-House, NP Swab in UTM/VTM, 2 HR TAT - Swab, Nasopharynx [[id number]]  (Normal) Collected: 05/21/25 8174    Lab Status: Final result Specimen: Swab from Nasopharynx Updated: 05/21/25 1922     ADENOVIRUS, PCR Not Detected     Coronavirus 229E Not Detected     Coronavirus HKU1 Not Detected     Coronavirus NL63 Not Detected     Coronavirus OC43 Not Detected "     COVID19 Not Detected     Human Metapneumovirus Not Detected     Human Rhinovirus/Enterovirus Not Detected     Influenza A PCR Not Detected     Influenza B PCR Not Detected     Parainfluenza Virus 1 Not Detected     Parainfluenza Virus 2 Not Detected     Parainfluenza Virus 3 Not Detected     Parainfluenza Virus 4 Not Detected     RSV, PCR Not Detected     Bordetella pertussis pcr Not Detected     Bordetella parapertussis PCR Not Detected     Chlamydophila pneumoniae PCR Not Detected     Mycoplasma pneumo by PCR Not Detected    Narrative:      In the setting of a positive respiratory panel with a viral infection PLUS a negative procalcitonin without other underlying concern for bacterial infection, consider observing off antibiotics or discontinuation of antibiotics and continue supportive care. If the respiratory panel is positive for atypical bacterial infection (Bordetella pertussis, Chlamydophila pneumoniae, or Mycoplasma pneumoniae), consider antibiotic de-escalation to target atypical bacterial infection.            Rosangela Villeda, PharmD  05/22/25 04:08 EDT

## 2025-05-22 NOTE — PLAN OF CARE
Problem: Adult Inpatient Plan of Care  Goal: Plan of Care Review  Outcome: Progressing  Flowsheets  Taken 5/22/2025 0715 by Margaret Aguila RN  Outcome Evaluation: Pts blood pressure was 93/54 with a map of 60. Notified Kimber. Pt got a bolus and stat labs. Pt started on vancomycin and on continuous fluids. Lactate was 1.5. Pt has no questions or concerns at this time.  Taken 5/21/2025 0138 by Tiffany Zarco RN  Progress: no change  Plan of Care Reviewed With: patient  Goal: Patient-Specific Goal (Individualized)  Outcome: Progressing  Goal: Absence of Hospital-Acquired Illness or Injury  Outcome: Progressing  Intervention: Identify and Manage Fall Risk  Recent Flowsheet Documentation  Taken 5/22/2025 0436 by Margaret Aguila RN  Safety Promotion/Fall Prevention: safety round/check completed  Taken 5/22/2025 0200 by Margaret Aguila RN  Safety Promotion/Fall Prevention: safety round/check completed  Taken 5/22/2025 0030 by Margaret Aguila RN  Safety Promotion/Fall Prevention: safety round/check completed  Taken 5/21/2025 2200 by Margaret Aguila RN  Safety Promotion/Fall Prevention: safety round/check completed  Taken 5/21/2025 2000 by Margaret Aguila RN  Safety Promotion/Fall Prevention: safety round/check completed  Intervention: Prevent Skin Injury  Recent Flowsheet Documentation  Taken 5/22/2025 0030 by Margaret Aguila RN  Body Position: position changed independently  Taken 5/21/2025 2000 by Margaret Aguila RN  Body Position: position changed independently  Intervention: Prevent and Manage VTE (Venous Thromboembolism) Risk  Recent Flowsheet Documentation  Taken 5/21/2025 2000 by Margaret Aguila RN  VTE Prevention/Management:   bilateral   SCDs (sequential compression devices) off  Intervention: Prevent Infection  Recent Flowsheet Documentation  Taken 5/21/2025 2000 by Margaret Aguila RN  Infection Prevention:   single patient room  provided   rest/sleep promoted  Goal: Optimal Comfort and Wellbeing  Outcome: Progressing  Intervention: Monitor Pain and Promote Comfort  Recent Flowsheet Documentation  Taken 5/21/2025 2000 by Margaret Aguila, RN  Pain Management Interventions: no interventions per patient request  Intervention: Provide Person-Centered Care  Recent Flowsheet Documentation  Taken 5/21/2025 2000 by Margaret Aguila, RN  Trust Relationship/Rapport:   care explained   choices provided   emotional support provided  Goal: Readiness for Transition of Care  Outcome: Progressing   Goal Outcome Evaluation:              Outcome Evaluation: Pts blood pressure was 93/54 with a map of 60. Notified Kimber. Pt got a bolus and stat labs. Pt started on vancomycin and on continuous fluids. Lactate was 1.5. Pt has no questions or concerns at this time.

## 2025-05-22 NOTE — PROGRESS NOTES
[facility] MEDICINE SERVICE  DAILY PROGRESS NOTE    NAME: Angella Santos  : 1960  MRN: [id number]      LOS: 3 days     PROVIDER OF SERVICE: Layo Tapia MD    Chief Complaint: Leukopenia    Subjective:     Interval History:  History taken from: patient chart RN  Patient Complaints:     - Still complaining of the same headache on the left occipital area that is burning/shocks comes for only few seconds and resolves  - Having too many episode of those shocks   - Did not respond very well to Neurontin however did okay with Fioricet, but was concerned about taking it because it has Tylenol  - Afebrile overnight   - Discussed imaging studies/workup with the patient      - Still having some headache however definitely better than it was yesterday  - Developed a fever yesterday and patient was started empirically on cefepime, blood sample for culture was obtained  - Denied having any cough, no shortness of breath, no abdominal pain, no diarrhea    Objective:     Vital Signs  Temp:  [98.3 °F (36.8 °C)-103.1 °F (39.5 °C)] 98.4 °F (36.9 °C)  Heart Rate:  [66-87] 66  Resp:  [12-16] 16  BP: ()/(54-82) 98/67   Body mass index is 20.76 kg/m².    Physical Exam  General Appearance:  Alert, cooperative, no distress, appears stated age  Head:   Normocephalic, without obvious abnormality, atraumatic  Eyes:   PERRL, conjunctivae/corneas clear, EOM's intact, fundi benign, both eyes  Ears:    Normal TM's and external ear canals, both ears  Nose:   Nares normal, septum midline, mucosa normal, no drainage or sinus tenderness  Throat: Lips, mucosa, and tongue normal; teeth and gums normal  Neck:   Supple, symmetrical, trachea midline, no adenopathy, thyroid: not enlarged, symmetric, no tenderness/mass/nodules, no carotid bruit or JVD  Lungs:             Clear to auscultation bilaterally, respirations unlabored  Heart:  Regular rate and rhythm, S1, S2 normal, no murmur, rub or gallop  Abdomen:  Soft, nontender, bowel  sounds active all four quadrants,  no masses, no organomegaly  Extremities:     Extremities normal, atraumatic, no cyanosis or edema  Pulses:            2+ and symmetric  Skin:    Skin color, texture, turgor normal, no rashes or lesions  Neurologic: Normal    Scheduled Meds   levETIRAcetam, 500 mg, Oral, Q12H  meropenem, 1,000 mg, Intravenous, Q8H  pantoprazole, 40 mg, Oral, QAM AC  vancomycin, 750 mg, Intravenous, Q12H       PRN Meds     acetaminophen    baclofen    butalbital-acetaminophen-caffeine    Calcium Replacement - Follow Nurse / BPA Driven Protocol    gabapentin    ibuprofen    Magnesium Standard Dose Replacement - Follow Nurse / BPA Driven Protocol    melatonin    ondansetron ODT **OR** ondansetron    Pharmacy to dose vancomycin    Phosphorus Replacement - Follow Nurse / BPA Driven Protocol    Potassium Replacement - Follow Nurse / BPA Driven Protocol    senna    [COMPLETED] Insert Peripheral IV **AND** sodium chloride   Infusions  Pharmacy to dose vancomycin,   sodium chloride, 100 mL/hr, Last Rate: 100 mL/hr (05/22/25 0629)          Diagnostic Data    Results from last 7 days   Lab Units 05/22/25  0050   WBC 10*3/mm3 0.80*   HEMOGLOBIN g/dL 11.7*   HEMATOCRIT % 36.2   PLATELETS 10*3/mm3 37*   GLUCOSE mg/dL 123*   CREATININE mg/dL 0.94   BUN mg/dL 12   SODIUM mmol/L 131*   POTASSIUM mmol/L 3.8   AST (SGOT) U/L 425*   ALT (SGPT) U/L 361*   ALK PHOS U/L 508*   BILIRUBIN mg/dL 1.0   ANION GAP mmol/L 7.9       CT Abdomen Pelvis Without Contrast  Result Date: 5/22/2025  1.No acute findings within the chest, abdomen or pelvis. 2.Small nonobstructing bilateral renal stones. Electronically Signed: Alejandra Snyder MD  5/22/2025 8:23 AM EDT  Workstation ID: [id number]    CT Chest Without Contrast Diagnostic  Result Date: 5/22/2025  1.No acute findings within the chest, abdomen or pelvis. 2.Small nonobstructing bilateral renal stones. Electronically Signed: Alejandra Snyder MD  5/22/2025 8:23 AM EDT  Workstation ID:  [id number]    XR Chest 1 View  Result Date: 5/21/2025  No active disease. Electronically Signed: Cirspin Maxwell MD  5/21/2025 6:03 PM EDT  Workstation ID: [id number]    CT Bone marrow biopsy and aspiration  Result Date: 5/21/2025  Impression: Technically successful CT-guided left posterior iliac 11-gauge bone marrow aspiration and core biopsy, as described. Electronically Signed: Jp Torres MD  5/21/2025 2:46 PM EDT  Workstation ID: [id number]    MRI Brain With & Without Contrast  Result Date: 5/20/2025  Impression: 1. No acute intracranial abnormalities identified. 2. 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 Electronically Signed: Crispin Maxwell MD  5/20/2025 4:41 PM EDT  Workstation ID: [id number]        I reviewed the patient's new clinical results.    Assessment/Plan:     Active and Resolved Problems  Active Hospital Problems    Diagnosis  POA    **Leukopenia [D72.819]  Yes      Resolved Hospital Problems   No resolved problems to display.       Neutropenia  Thrombocytopenia   History of tick bite   Severe left parietal headache, possible atypical trigeminal neuralgia  Transaminitis  Neutropenic fever      - Evaluated by hematology, workup was initiated  - Patient underwent bone marrow biopsy yesterday, results are still pending  - Tick panel is pending  - Acute hepatitis are negative   - Patient developed fever yesterday and started on empiric antibiotics  - Blood samples was obtained for culture  - ID consult  - Exam was pretty benign no clear etiology for the source of infection yet  - Keep on neutropenic precautions, white blood cells unfortunately continue to trend down and it is 0.8    -Evaluated by neurology, imaging studies are negative for acute process she was started on Neurontin and baclofen yesterday however did not have much response according to her she did better with Fioricet  - Her description of the pain clearly seems to be neuropathic  - Had inconsistent response to the  treatment with baclofen and Neurontin, evaluated again by neurology who believes that the patient might have atypical trigeminal neuralgia and due to her leukopenia she is not arjun be a good candidate to start carbamazepine for which she was switched to Keppra    VTE Prophylaxis:  Mechanical VTE prophylaxis orders are present.         Code status is   Code Status and Medical Interventions: CPR (Attempt to Resuscitate); Limited Support; No artificial nutrition   Ordered at: 05/20/25 0116     Code Status (Patient has no pulse and is not breathing):    CPR (Attempt to Resuscitate)     Medical Interventions (Patient has pulse or is breathing):    Limited Support     Medical Intervention Limits:    No artificial nutrition       Plan for disposition: TBD     Time: 30 minutes    Signature: Electronically signed by Layo Tapia MD, 05/22/25, 11:17 EDT.  [facility] Hospitalist Team

## 2025-05-23 LAB
A PHAGOCYTOPH DNA BLD QL NAA+PROBE: NEGATIVE
ALBUMIN SERPL-MCNC: 3.2 G/DL (ref 3.5–5.2)
ALBUMIN/GLOB SERPL: 1.9 G/DL
ALP SERPL-CCNC: 519 U/L (ref 39–117)
ALT SERPL W P-5'-P-CCNC: 536 U/L (ref 1–33)
ANION GAP SERPL CALCULATED.3IONS-SCNC: 7.5 MMOL/L (ref 5–15)
AST SERPL-CCNC: 614 U/L (ref 1–32)
BILIRUB SERPL-MCNC: 0.8 MG/DL (ref 0–1.2)
BUN SERPL-MCNC: 8 MG/DL (ref 8–23)
BUN/CREAT SERPL: 8.7 (ref 7–25)
CALCIUM SPEC-SCNC: 7.4 MG/DL (ref 8.6–10.5)
CHLORIDE SERPL-SCNC: 105 MMOL/L (ref 98–107)
CMV DNA SERPL NAA+PROBE-ACNC: NEGATIVE IU/ML
CMV DNA SERPL NAA+PROBE-LOG IU: NORMAL LOG10 IU/ML
CO2 SERPL-SCNC: 21.5 MMOL/L (ref 22–29)
CREAT SERPL-MCNC: 0.92 MG/DL (ref 0.57–1)
DEPRECATED RDW RBC AUTO: 43.1 FL (ref 37–54)
EBV DNA SERPL NAA+PROBE-ACNC: NEGATIVE IU/ML
EBV EARLY AG: NEGATIVE
EBV NUCLEAR AG: POSITIVE
EBV VCA IGG: POSITIVE
EBV VCA IGM: NEGATIVE
EGFRCR SERPLBLD CKD-EPI 2021: 69.2 ML/MIN/1.73
EHRLICHIA DNA SPEC QL NAA+PROBE: POSITIVE
ERYTHROCYTE [DISTWIDTH] IN BLOOD BY AUTOMATED COUNT: 12.5 % (ref 12.3–15.4)
GLOBULIN UR ELPH-MCNC: 1.7 GM/DL
GLUCOSE SERPL-MCNC: 100 MG/DL (ref 65–99)
HCT VFR BLD AUTO: 33.4 % (ref 34–46.6)
HGB BLD-MCNC: 11 G/DL (ref 12–15.9)
LYMPHOCYTES # BLD MANUAL: 0.53 10*3/MM3 (ref 0.7–3.1)
LYMPHOCYTES NFR BLD MANUAL: 8 % (ref 5–12)
MCH RBC QN AUTO: 30.7 PG (ref 26.6–33)
MCHC RBC AUTO-ENTMCNC: 32.9 G/DL (ref 31.5–35.7)
MCV RBC AUTO: 93.3 FL (ref 79–97)
MONOCYTES # BLD: 0.11 10*3/MM3 (ref 0.1–0.9)
NEUTROPHILS # BLD AUTO: 0.79 10*3/MM3 (ref 1.7–7)
NEUTROPHILS NFR BLD MANUAL: 46 % (ref 42.7–76)
NEUTS BAND NFR BLD MANUAL: 9 % (ref 0–5)
PLATELET # BLD AUTO: 33 10*3/MM3 (ref 140–450)
PMV BLD AUTO: 13.1 FL (ref 6–12)
POTASSIUM SERPL-SCNC: 3.4 MMOL/L (ref 3.5–5.2)
POTASSIUM SERPL-SCNC: 4.6 MMOL/L (ref 3.5–5.2)
PROT SERPL-MCNC: 4.9 G/DL (ref 6–8.5)
RBC # BLD AUTO: 3.58 10*6/MM3 (ref 3.77–5.28)
RBC MORPH BLD: NORMAL
RICKETTSIA RICKETTSII DNA, RT: NOT DETECTED
SCAN SLIDE: NORMAL
SMALL PLATELETS BLD QL SMEAR: ABNORMAL
SODIUM SERPL-SCNC: 134 MMOL/L (ref 136–145)
VARIANT LYMPHS NFR BLD MANUAL: 2 % (ref 0–5)
VARIANT LYMPHS NFR BLD MANUAL: 35 % (ref 19.6–45.3)
WBC MORPH BLD: NORMAL
WBC NRBC COR # BLD AUTO: 1.43 10*3/MM3 (ref 3.4–10.8)

## 2025-05-23 PROCEDURE — 25010000002 MEROPENEM PER 100 MG: Performed by: INTERNAL MEDICINE

## 2025-05-23 PROCEDURE — 85007 BL SMEAR W/DIFF WBC COUNT: CPT | Performed by: INTERNAL MEDICINE

## 2025-05-23 PROCEDURE — 80053 COMPREHEN METABOLIC PANEL: CPT

## 2025-05-23 PROCEDURE — 85025 COMPLETE CBC W/AUTO DIFF WBC: CPT | Performed by: INTERNAL MEDICINE

## 2025-05-23 PROCEDURE — 84132 ASSAY OF SERUM POTASSIUM: CPT | Performed by: INTERNAL MEDICINE

## 2025-05-23 PROCEDURE — 99232 SBSQ HOSP IP/OBS MODERATE 35: CPT | Performed by: INTERNAL MEDICINE

## 2025-05-23 RX ORDER — POTASSIUM CHLORIDE 1500 MG/1
40 TABLET, EXTENDED RELEASE ORAL EVERY 4 HOURS
Status: COMPLETED | OUTPATIENT
Start: 2025-05-23 | End: 2025-05-23

## 2025-05-23 RX ADMIN — POTASSIUM CHLORIDE 40 MEQ: 1500 TABLET, EXTENDED RELEASE ORAL at 05:18

## 2025-05-23 RX ADMIN — MEROPENEM 1000 MG: 1 INJECTION INTRAVENOUS at 10:17

## 2025-05-23 RX ADMIN — DOXYCYCLINE 100 MG: 100 CAPSULE ORAL at 08:51

## 2025-05-23 RX ADMIN — POTASSIUM CHLORIDE 40 MEQ: 1500 TABLET, EXTENDED RELEASE ORAL at 08:50

## 2025-05-23 RX ADMIN — LEVETIRACETAM 500 MG: 500 TABLET, FILM COATED ORAL at 20:44

## 2025-05-23 RX ADMIN — MEROPENEM 1000 MG: 1 INJECTION INTRAVENOUS at 17:21

## 2025-05-23 RX ADMIN — PANTOPRAZOLE SODIUM 40 MG: 40 TABLET, DELAYED RELEASE ORAL at 08:51

## 2025-05-23 RX ADMIN — MEROPENEM 1000 MG: 1 INJECTION INTRAVENOUS at 02:15

## 2025-05-23 RX ADMIN — IBUPROFEN 400 MG: 400 TABLET, FILM COATED ORAL at 20:44

## 2025-05-23 RX ADMIN — LEVETIRACETAM 500 MG: 500 TABLET, FILM COATED ORAL at 08:50

## 2025-05-23 RX ADMIN — DOXYCYCLINE 100 MG: 100 CAPSULE ORAL at 20:44

## 2025-05-23 NOTE — PLAN OF CARE
Problem: Adult Inpatient Plan of Care  Goal: Plan of Care Review  Outcome: Progressing  Flowsheets  Taken 5/23/2025 0510 by Margaret Aguila RN  Progress: improving  Outcome Evaluation: Pt is currently resting in bed with call light in reach. Potassium is 3.4, starting replacement this morning. Pt has no questions or concerns at this time.  Taken 5/21/2025 0138 by Tiffany Zarco RN  Plan of Care Reviewed With: patient  Goal: Patient-Specific Goal (Individualized)  Outcome: Progressing  Goal: Absence of Hospital-Acquired Illness or Injury  Outcome: Progressing  Intervention: Identify and Manage Fall Risk  Recent Flowsheet Documentation  Taken 5/23/2025 0400 by Margaret Aguila RN  Safety Promotion/Fall Prevention: safety round/check completed  Taken 5/23/2025 0200 by Margaret Aguila RN  Safety Promotion/Fall Prevention: safety round/check completed  Taken 5/23/2025 0000 by Margaret Aguila RN  Safety Promotion/Fall Prevention: safety round/check completed  Taken 5/22/2025 2200 by Margaret Aguila RN  Safety Promotion/Fall Prevention: safety round/check completed  Intervention: Prevent and Manage VTE (Venous Thromboembolism) Risk  Recent Flowsheet Documentation  Taken 5/22/2025 2008 by Margaret Aguila RN  VTE Prevention/Management:   bilateral   SCDs (sequential compression devices) off  Goal: Optimal Comfort and Wellbeing  Outcome: Progressing  Intervention: Monitor Pain and Promote Comfort  Recent Flowsheet Documentation  Taken 5/22/2025 2243 by Margaret Aguila RN  Pain Management Interventions: pain medication given  Taken 5/22/2025 2008 by Margaret Aguila RN  Pain Management Interventions: no interventions per patient request  Intervention: Provide Person-Centered Care  Recent Flowsheet Documentation  Taken 5/22/2025 2008 by Margaret Aguila RN  Trust Relationship/Rapport:   care explained   choices provided   emotional support provided  Goal:  Readiness for Transition of Care  Outcome: Progressing   Goal Outcome Evaluation:           Progress: improving  Outcome Evaluation: Pt is currently resting in bed with call light in reach. Potassium is 3.4, starting replacement this morning. Pt has no questions or concerns at this time.

## 2025-05-23 NOTE — CASE MANAGEMENT/SOCIAL WORK
Continued Stay Note  CHUCK Aly     Patient Name: Angella Santos  MRN: [id number]  Today's Date: 5/23/2025    Admit Date: 5/19/2025    Plan: Routine home with spouse.   Discharge Plan       Row Name 05/23/25 1348       Plan    Plan Comments DC barriers: pending bone biopsy results, abnormal labs with electrolyte replacement, elevated ALT/AST, WBC 1.43, blood cultures pending results. ID, neurology, oncology following                 Gayla Blackmon RN     Office phone: [phone]  Office fax: [phone]

## 2025-05-23 NOTE — PROGRESS NOTES
Patient improving  The stabbing electrical sensation type headache is resolved  Some dull headache, not as bad as yesterday  No signs of meningismus or encephalitis  Continue present supportive care

## 2025-05-23 NOTE — PROGRESS NOTES
Infectious Diseases Progress Note      LOS: 4 days   Patient Care Team:  Nikhil Maloney MD as PCP - General  Sherry Negrete, BARBARA as Ambulatory  (Oncology) ([facility] [place])    Chief Complaint: Headache, weakness    Subjective       Patient had a high-grade fever yesterday afternoon but has been afebrile since.  She is hemodynamically stable and tolerating antimicrobial therapy.  Still having a headache and some weakness but symptoms are improving overall.  Currently sitting in the chair      Review of Systems:   Review of Systems   Eyes: Negative.    Respiratory: Negative.     Cardiovascular: Negative.    Gastrointestinal: Negative.    Endocrine: Negative.    Genitourinary: Negative.    Musculoskeletal: Negative.    Skin: Negative.    Neurological:  Positive for weakness and headaches.   Psychiatric/Behavioral: Negative.     All other systems reviewed and are negative.       Objective     Vital Signs  Temp:  [97.5 °F (36.4 °C)-101.2 °F (38.4 °C)] 98 °F (36.7 °C)  Heart Rate:  [67-83] 68  Resp:  [14-20] 20  BP: ()/(54-73) 102/66    Physical Exam:  Physical Exam  Vitals and nursing note reviewed.   Constitutional:       General: She is not in acute distress.     Appearance: She is well-developed and normal weight. She is ill-appearing. She is not diaphoretic.   HENT:      Head: Normocephalic and atraumatic.   Eyes:      General: No scleral icterus.     Extraocular Movements: Extraocular movements intact.      Conjunctiva/sclera: Conjunctivae normal.      Pupils: Pupils are equal, round, and reactive to light.   Cardiovascular:      Rate and Rhythm: Normal rate and regular rhythm.      Heart sounds: Normal heart sounds, S1 normal and S2 normal. No murmur heard.  Pulmonary:      Effort: Pulmonary effort is normal. No respiratory distress.      Breath sounds: Normal breath sounds. No stridor. No wheezing or rales.   Chest:      Chest wall: No tenderness.   Abdominal:      General: Bowel sounds are  normal. There is no distension.      Palpations: Abdomen is soft. There is no mass.      Tenderness: There is no abdominal tenderness. There is no guarding.   Musculoskeletal:         General: No swelling, tenderness or deformity. Normal range of motion.      Cervical back: Neck supple.   Skin:     General: Skin is warm and dry.      Coloration: Skin is not pale.      Findings: No bruising, erythema or rash.   Neurological:      General: No focal deficit present.      Mental Status: She is alert and oriented to person, place, and time. Mental status is at baseline.      Cranial Nerves: No cranial nerve deficit.   Psychiatric:         Mood and Affect: Mood normal.          Results Review:    I have reviewed all clinical data, test, lab, and imaging results.     Radiology  No Radiology Exams Resulted Within Past 24 Hours    Cardiology    Laboratory    Results from last 7 days   Lab Units 05/23/25 0226 05/22/25  0050 05/21/25  0551 05/19/25  2353 05/19/25  2016   WBC 10*3/mm3 1.43* 0.80* 1.04* 1.53* 1.64*   HEMOGLOBIN g/dL 11.0* 11.7* 13.1 13.6 13.6   HEMATOCRIT % 33.4* 36.2 40.3 40.8 41.6   PLATELETS 10*3/mm3 33* 37* 53* 84* 97*     Results from last 7 days   Lab Units 05/23/25 0226 05/22/25  0050 05/21/25  0551 05/19/25  2353 05/19/25  2016   SODIUM mmol/L 134* 131* 137 137 132*   POTASSIUM mmol/L 3.4* 3.8 5.0 4.7 4.4   CHLORIDE mmol/L 105 99 103 104 99   CO2 mmol/L 21.5* 24.1 24.1 23.9 23.8   BUN mg/dL 8 12 10 13 16   CREATININE mg/dL 0.92 0.94 1.01* 0.90 0.99   GLUCOSE mg/dL 100* 123* 99 134* 164*   ALBUMIN g/dL 3.2* 3.6 3.9 4.0 4.3   BILIRUBIN mg/dL 0.8 1.0 0.8 0.5 0.5   ALK PHOS U/L 519* 508* 409* 181* 171*   AST (SGOT) U/L 614* 425* 177* 166* 140*   ALT (SGPT) U/L 536* 361* 175* 140* 132*   CALCIUM mg/dL 7.4* 8.1* 9.0 9.0 9.2                 Microbiology   Microbiology Results (last 10 days)       Procedure Component Value - Date/Time    MRSA Screen, PCR (Inpatient) - Swab, Nares [[id number]]  (Normal)  Collected: 05/22/25 0817    Lab Status: Final result Specimen: Swab from Nares Updated: 05/22/25 1003     MRSA PCR No MRSA Detected    Narrative:      The negative predictive value of this diagnostic test is high and should only be used to consider de-escalating anti-MRSA therapy. A positive result may indicate colonization with MRSA and must be correlated clinically.    Blood Culture - Blood, Arm, Left [[id number]]  (Normal) Collected: 05/21/25 1805    Lab Status: Preliminary result Specimen: Blood from Arm, Left Updated: 05/22/25 1831     Blood Culture No growth at 24 hours    Respiratory Panel PCR w/COVID-19(SARS-CoV-2) JL/IVONNE/ZAYNAB/PAD/COR/STACIA In-House, NP Swab in UTM/VTM, 2 HR TAT - Swab, Nasopharynx [[id number]]  (Normal) Collected: 05/21/25 1709    Lab Status: Final result Specimen: Swab from Nasopharynx Updated: 05/21/25 1922     ADENOVIRUS, PCR Not Detected     Coronavirus 229E Not Detected     Coronavirus HKU1 Not Detected     Coronavirus NL63 Not Detected     Coronavirus OC43 Not Detected     COVID19 Not Detected     Human Metapneumovirus Not Detected     Human Rhinovirus/Enterovirus Not Detected     Influenza A PCR Not Detected     Influenza B PCR Not Detected     Parainfluenza Virus 1 Not Detected     Parainfluenza Virus 2 Not Detected     Parainfluenza Virus 3 Not Detected     Parainfluenza Virus 4 Not Detected     RSV, PCR Not Detected     Bordetella pertussis pcr Not Detected     Bordetella parapertussis PCR Not Detected     Chlamydophila pneumoniae PCR Not Detected     Mycoplasma pneumo by PCR Not Detected    Narrative:      In the setting of a positive respiratory panel with a viral infection PLUS a negative procalcitonin without other underlying concern for bacterial infection, consider observing off antibiotics or discontinuation of antibiotics and continue supportive care. If the respiratory panel is positive for atypical bacterial infection (Bordetella pertussis, Chlamydophila pneumoniae, or  Mycoplasma pneumoniae), consider antibiotic de-escalation to target atypical bacterial infection.    Blood Culture - Blood, Arm, Left [[id number]]  (Normal) Collected: 05/21/25 1706    Lab Status: Preliminary result Specimen: Blood from Arm, Left Updated: 05/22/25 1831     Blood Culture No growth at 24 hours            Medication Review:       Schedule Meds  doxycycline, 100 mg, Oral, Q12H  levETIRAcetam, 500 mg, Oral, Q12H  meropenem, 1,000 mg, Intravenous, Q8H  pantoprazole, 40 mg, Oral, QAM AC        Infusion Meds       PRN Meds    acetaminophen    baclofen    butalbital-acetaminophen-caffeine    Calcium Replacement - Follow Nurse / BPA Driven Protocol    gabapentin    ibuprofen    Magnesium Standard Dose Replacement - Follow Nurse / BPA Driven Protocol    melatonin    ondansetron ODT **OR** ondansetron    Phosphorus Replacement - Follow Nurse / BPA Driven Protocol    Potassium Replacement - Follow Nurse / BPA Driven Protocol    senna    [COMPLETED] Insert Peripheral IV **AND** sodium chloride        Assessment & Plan       Antimicrobial Therapy   1.  P.o. doxycycline        2.        3.        4.        5.            Assessment     Febrile illness associated with pancytopenia and elevated transaminase with tick exposure.  Presentation is highly concerning for tickborne infection such as ehrlichiosis.  Other possibility will be viral infection such as CMV or Frank-Barr virus infection.  Malignancy is in the differential diagnosis but less likely.  Cytomegalovirus IgM and PCR were negative.  Frank-Morris IgM was negative     Severe neutropenia.  The patient is not known to be on immunosuppressive therapy.  The patient is being followed by hematology service and status post bone marrow biopsy     Plan     Continue p.o. doxycycline 100 mg twice daily     Continue IV meropenem since patient had neutropenic fever.  Can discontinue when neutropenia resolved     Serum for Ehrlichia PCR-pending  Frank-Barr virus  quantitative PCR-pending  Waiting on bone marrow biopsy results  Continue supportive care  A.m. labs  Case discussed with patient and family member at the bedside  Case discussed with neurology    Shayla Gonsalves, ARABELLA  05/23/25  12:26 EDT    Note is dictated utilizing voice recognition software/Dragon

## 2025-05-23 NOTE — PLAN OF CARE
Problem: Adult Inpatient Plan of Care  Goal: Plan of Care Review  Outcome: Progressing  Goal: Patient-Specific Goal (Individualized)  Outcome: Progressing  Goal: Absence of Hospital-Acquired Illness or Injury  Outcome: Progressing  Intervention: Identify and Manage Fall Risk  Recent Flowsheet Documentation  Taken 5/23/2025 1046 by Cleopatra Salazar RN  Safety Promotion/Fall Prevention: safety round/check completed  Taken 5/23/2025 0824 by Cleopatra Salazar RN  Safety Promotion/Fall Prevention: safety round/check completed  Intervention: Prevent Skin Injury  Recent Flowsheet Documentation  Taken 5/23/2025 0824 by Cleopatra Salazar RN  Body Position: position changed independently  Intervention: Prevent and Manage VTE (Venous Thromboembolism) Risk  Recent Flowsheet Documentation  Taken 5/23/2025 0824 by Cleopatra Salazar RN  VTE Prevention/Management:   bilateral   SCDs (sequential compression devices) off  Intervention: Prevent Infection  Recent Flowsheet Documentation  Taken 5/23/2025 0824 by Cleopatra Salazar RN  Infection Prevention: single patient room provided  Goal: Optimal Comfort and Wellbeing  Outcome: Progressing  Intervention: Provide Person-Centered Care  Recent Flowsheet Documentation  Taken 5/23/2025 0824 by Cleopatra Salazar RN  Trust Relationship/Rapport:   care explained   emotional support provided   choices provided   empathic listening provided   questions answered  Goal: Readiness for Transition of Care  Outcome: Progressing     Problem: Infection  Goal: Absence of Infection Signs and Symptoms  Outcome: Progressing  Intervention: Prevent or Manage Infection  Recent Flowsheet Documentation  Taken 5/23/2025 0824 by Cleopatra Salazar RN  Isolation Precautions:   protective   precautions maintained     Problem: Fall Injury Risk  Goal: Absence of Fall and Fall-Related Injury  Outcome: Progressing  Intervention: Identify and Manage Contributors  Recent Flowsheet  Documentation  Taken 5/23/2025 0824 by Cleopatra Salazar RN  Medication Review/Management: medications reviewed  Intervention: Promote Injury-Free Environment  Recent Flowsheet Documentation  Taken 5/23/2025 1046 by Cleopatra Salazar RN  Safety Promotion/Fall Prevention: safety round/check completed  Taken 5/23/2025 0824 by Cleopatra Salazar RN  Safety Promotion/Fall Prevention: safety round/check completed     Problem: Sepsis/Septic Shock  Goal: Optimal Coping  Outcome: Progressing  Goal: Absence of Bleeding  Outcome: Progressing  Goal: Blood Glucose Level Within Target Range  Outcome: Progressing  Goal: Absence of Infection Signs and Symptoms  Outcome: Progressing  Intervention: Initiate Sepsis Management  Recent Flowsheet Documentation  Taken 5/23/2025 0824 by Cleopatra Salazar RN  Infection Prevention: single patient room provided  Isolation Precautions:   protective   precautions maintained  Intervention: Promote Recovery  Recent Flowsheet Documentation  Taken 5/23/2025 0900 by Cleopatra Salazar RN  Activity Management: ambulated in room  Taken 5/23/2025 0824 by Cleopatra Salazar RN  Activity Management: activity encouraged  Goal: Optimal Nutrition Delivery  Outcome: Progressing   Goal Outcome Evaluation:   Pt has been relaxing in bed with family at bedside. She completed her abx today. Her continuous fluids have been discontinued per providers orders as well. No concerns at this time and call light within reach.

## 2025-05-23 NOTE — PROGRESS NOTES
Hematology/Oncology Inpatient Progress Note    PATIENT NAME: Angella Santos  : 1960  MRN: [id number]    CHIEF COMPLAINT: Headache    HISTORY OF PRESENT ILLNESS:      2025: I was asked to see Ms. Santos who came to the hospital complaining of an intense headache. Headaches are relatively unusual for and never as long-lasting as this time. She had imaging of the brain that revealed no obvious abnormalities. In addition she had laboratory exams. A blood count reported leukopenia with mild neutropenia and lymphopenia. Hemoglobin and mean corpuscular volume were well within the normal range and she also had thrombocytopenia. I am asked to investigate. She is feeling better this morning although she persists with headache. In general, however, she has felt well. She reports being active and maintaining a stable weight. Her appetite is adequate and she has been afebrile and without nocturnal diaphoresis. She has had no respiratory symptoms, chest pains or abdominal pain. She has been taking regular bowel activity and has had no dysuria or hematuria. She has not started to take any medications in the recent past. She takes multiple supplements. She has no significant past medical history. On exam she is a slender well-built woman who is conversant and well-oriented. She does not seem acutely ill. She is neither jaundiced nor pale. No oral lesions. Respirations not labored and lungs clear bilaterally. Heart regular. Abdomen soft and without hepatomegaly or splenomegaly. No edema. Laboratory exams reviewed. Discussed with her. The changes appear to be rather acute and without an obvious explanation at this time. I have requested laboratory exams and she may need to have a bone marrow biopsy and aspiration. Discussed at length with her.     Subjective   2025: Still not feeling better.  Has continued to have episodes of headache that are frequent and have kept her from resting and sleeping.  She is able to  eat some.  He has been afebrile for about 24 hours now.  ROS:  Review of Systems   Constitutional:  Negative for activity change, appetite change, chills, diaphoresis, fatigue, fever and unexpected weight change.   HENT:  Negative for congestion, dental problem, drooling, ear discharge, ear pain, facial swelling, hearing loss, mouth sores, nosebleeds, postnasal drip, rhinorrhea, sinus pressure, sinus pain, sneezing, sore throat, tinnitus, trouble swallowing and voice change.    Eyes:  Negative for photophobia, pain, discharge, redness, itching and visual disturbance.   Respiratory:  Negative for apnea, cough, choking, chest tightness, shortness of breath, wheezing and stridor.    Cardiovascular:  Negative for chest pain, palpitations and leg swelling.   Gastrointestinal:  Negative for abdominal distention, abdominal pain, anal bleeding, blood in stool, constipation, diarrhea, nausea, rectal pain and vomiting.   Endocrine: Negative for cold intolerance, heat intolerance, polydipsia and polyuria.   Genitourinary:  Negative for decreased urine volume, difficulty urinating, dysuria, flank pain, frequency, genital sores, hematuria and urgency.   Musculoskeletal:  Negative for arthralgias, back pain, gait problem, joint swelling, myalgias, neck pain and neck stiffness.   Skin:  Negative for color change, pallor and rash.   Neurological:  Positive for headaches. Negative for dizziness, tremors, seizures, syncope, facial asymmetry, speech difficulty, weakness, light-headedness and numbness.   Hematological:  Negative for adenopathy. Does not bruise/bleed easily.   Psychiatric/Behavioral:  Negative for agitation, behavioral problems, confusion, decreased concentration, hallucinations, self-injury, sleep disturbance and suicidal ideas. The patient is not nervous/anxious.       MEDICATIONS:    Scheduled Meds:  doxycycline, 100 mg, Oral, Q12H  levETIRAcetam, 500 mg, Oral, Q12H  meropenem, 1,000 mg, Intravenous,  "Q8H  pantoprazole, 40 mg, Oral, QAM AC       Continuous Infusions:        PRN Meds:    acetaminophen    baclofen    butalbital-acetaminophen-caffeine    Calcium Replacement - Follow Nurse / BPA Driven Protocol    gabapentin    ibuprofen    Magnesium Standard Dose Replacement - Follow Nurse / BPA Driven Protocol    melatonin    ondansetron ODT **OR** ondansetron    Phosphorus Replacement - Follow Nurse / BPA Driven Protocol    Potassium Replacement - Follow Nurse / BPA Driven Protocol    senna    [COMPLETED] Insert Peripheral IV **AND** sodium chloride     ALLERGIES:    Allergies   Allergen Reactions    Cephalexin Hives       Objective    VITALS:   BP 94/68 (BP Location: Left arm, Patient Position: Lying)   Pulse 70   Temp 99 °F (37.2 °C) (Oral)   Resp 20   Ht 165.1 cm (65\")   Wt 56.6 kg (124 lb 12.5 oz)   SpO2 94%   BMI 20.76 kg/m²     PHYSICAL EXAM: (performed by MD)  Physical Exam  Constitutional:       General: She is not in acute distress.     Appearance: She is ill-appearing. She is not toxic-appearing or diaphoretic.   HENT:      Head: Normocephalic and atraumatic.      Right Ear: External ear normal.      Left Ear: External ear normal.      Nose: Nose normal.      Mouth/Throat:      Mouth: Mucous membranes are moist.      Pharynx: Oropharynx is clear. No oropharyngeal exudate or posterior oropharyngeal erythema.   Eyes:      General: No scleral icterus.        Right eye: No discharge.         Left eye: No discharge.      Conjunctiva/sclera: Conjunctivae normal.      Pupils: Pupils are equal, round, and reactive to light.   Cardiovascular:      Rate and Rhythm: Normal rate and regular rhythm.      Pulses: Normal pulses.      Heart sounds: No murmur heard.     No friction rub. No gallop.   Pulmonary:      Effort: No respiratory distress.      Breath sounds: No stridor. No wheezing, rhonchi or rales.   Abdominal:      General: Abdomen is flat. Bowel sounds are normal. There is no distension.      " Palpations: Abdomen is soft. There is no mass.      Tenderness: There is no abdominal tenderness. There is no right CVA tenderness, left CVA tenderness, guarding or rebound.      Hernia: No hernia is present.   Musculoskeletal:         General: No tenderness, deformity or signs of injury.      Cervical back: No rigidity.      Right lower leg: No edema.      Left lower leg: No edema.   Lymphadenopathy:      Cervical: No cervical adenopathy.   Skin:     Coloration: Skin is not jaundiced or pale.      Findings: No bruising, lesion or rash.   Neurological:      General: No focal deficit present.      Mental Status: She is alert and oriented to person, place, and time.      Cranial Nerves: No cranial nerve deficit.   Psychiatric:         Mood and Affect: Mood normal.         Behavior: Behavior normal.         Thought Content: Thought content normal.         Judgment: Judgment normal.     SERA George MD performed the physical exam on 5/23/2025 as documented above.    RECENT LABS:  Lab Results (last 24 hours)       Procedure Component Value Units Date/Time    Ehrlichia Profile DNA PCR [[id number]]  (Abnormal) Collected: 05/19/25 2210    Specimen: Blood from Arm, Right Updated: 05/23/25 1510     A. phagocytophilum PCR Negative     Comment: No Anaplasma phagocytophilum DNA detected.  A. phagocytophilum has been  characterized as the causative agent of Human Granulocytic  Ehrlichiosis (HGE).        Ehrlichia sp., PCR Positive     Comment: Ehrlichia sp. DNA detected.       Narrative:      Test(s) 141427-L. phagocytophilum PCR; 139618-Ehrlichia sp., PCR  was developed and its performance characteristics determined  by [facility]. It has not been cleared or approved by the Food  and Drug Administration.  Performed at:  [address]  [id number]  : Stalin Fournier MD, Phone:  [phone]    Frank-Barr Virus DNA, Quantitative [[id number]] Collected: 05/21/25 1805    Specimen:  Blood Updated: 05/23/25 1410     EBV DNA, Quant PCR, Plasma Negative IU/mL      Comment: No EBV DNA detected.  The linear range of this assay is 35 - 100,000,000 IU/mL       Narrative:      Performed at:  [address] [place] [address]  [id number]  : Stalin Fournier MD, Phone:  [phone]    Potassium [[id number]]  (Normal) Collected: 05/23/25 1313    Specimen: Blood from Arm, Right Updated: 05/23/25 1405     Potassium 4.6 mmol/L      Comment: Result checked         Frank-Barr Virus VCA Antibody Panel [[id number]]  (Abnormal) Collected: 05/21/25 1805    Specimen: Blood Updated: 05/23/25 1215     EBV VCA IgM Negative     EBV VCA IgG Positive     EBV Nuclear Ag Positive     EBV Early Ag Negative    CMV DNA, Quantitative, PCR [[id number]] Collected: 05/21/25 1805    Specimen: Blood Updated: 05/23/25 1111     CMV DNA Quant Negative IU/mL      Comment: No CMV DNA detected.  The quantitative range of this assay is 200 to 1 million IU/mL.        log 10 CMV Qn DNA PI TNP log10 IU/mL      Comment: Unable to calculate result since non-numeric result obtained for  component test.       Narrative:      Performed at:  [address] [place] [address]  [id number]  : Stalin Fournier MD, Phone:  [phone]    Comprehensive Metabolic Panel [[id number]]  (Abnormal) Collected: 05/23/25 0226    Specimen: Blood from Arm, Left Updated: 05/23/25 0320     Glucose 100 mg/dL      BUN 8 mg/dL      Creatinine 0.92 mg/dL      Sodium 134 mmol/L      Potassium 3.4 mmol/L      Chloride 105 mmol/L      CO2 21.5 mmol/L      Calcium 7.4 mg/dL      Total Protein 4.9 g/dL      Albumin 3.2 g/dL      ALT (SGPT) 536 U/L      AST (SGOT) 614 U/L      Alkaline Phosphatase 519 U/L      Total Bilirubin 0.8 mg/dL      Globulin 1.7 gm/dL      A/G Ratio 1.9 g/dL      BUN/Creatinine Ratio 8.7     Anion Gap 7.5 mmol/L      eGFR 69.2 mL/min/1.73     Narrative:      GFR Categories in Chronic Kidney  Disease (CKD)              GFR Category          GFR (mL/min/1.73)    Interpretation  G1                    90 or greater        Normal or high (1)  G2                    60-89                Mild decrease (1)  G3a                   45-59                Mild to moderate decrease  G3b                   30-44                Moderate to severe decrease  G4                    15-29                Severe decrease  G5                    14 or less           Kidney failure    (1)In the absence of evidence of kidney disease, neither GFR category G1 or G2 fulfill the criteria for CKD.    eGFR calculation 2021 CKD-EPI creatinine equation, which does not include race as a factor    CBC & Differential [[id number]]  (Abnormal) Collected: 05/23/25 0226    Specimen: Blood from Arm, Left Updated: 05/23/25 0314    Narrative:      The following orders were created for panel order CBC & Differential.  Procedure                               Abnormality         Status                     ---------                               -----------         ------                     CBC Auto Differential[[id number]]        Abnormal            Final result               Scan Slide[[id number]]                                       Final result                 Please view results for these tests on the individual orders.    Scan Slide [[id number]] Collected: 05/23/25 0226    Specimen: Blood from Arm, Left Updated: 05/23/25 0314     Scan Slide --     Comment: See Manual Differential Results       Manual Differential [[id number]]  (Abnormal) Collected: 05/23/25 0226    Specimen: Blood from Arm, Left Updated: 05/23/25 0314     Neutrophil % 46.0 %      Lymphocyte % 35.0 %      Monocyte % 8.0 %      Bands %  9.0 %      Atypical Lymphocyte % 2.0 %      Neutrophils Absolute 0.79 10*3/mm3      Lymphocytes Absolute 0.53 10*3/mm3      Monocytes Absolute 0.11 10*3/mm3      RBC Morphology Normal     WBC Morphology Normal     Platelet Estimate Decreased    CBC Auto  Differential [[id number]]  (Abnormal) Collected: 05/23/25 0226    Specimen: Blood from Arm, Left Updated: 05/23/25 0314     WBC 1.43 10*3/mm3      RBC 3.58 10*6/mm3      Hemoglobin 11.0 g/dL      Hematocrit 33.4 %      MCV 93.3 fL      MCH 30.7 pg      MCHC 32.9 g/dL      RDW 12.5 %      RDW-SD 43.1 fl      MPV 13.1 fL      Platelets 33 10*3/mm3     Narrative:      The previously reported component NRBC is no longer being reported. Previous result was 0.0 /100 WBC (Reference Range: 0.0-0.2 /100 WBC) on 5/23/2025 at 0258 EDT.    Blood Culture - Blood, Arm, Left [[id number]]  (Normal) Collected: 05/21/25 1706    Specimen: Blood from Arm, Left Updated: 05/22/25 1831     Blood Culture No growth at 24 hours    Blood Culture - Blood, Arm, Left [[id number]]  (Normal) Collected: 05/21/25 1805    Specimen: Blood from Arm, Left Updated: 05/22/25 1831     Blood Culture No growth at 24 hours          IMAGING REVIEWED:  CT Abdomen Pelvis Without Contrast  Result Date: 5/22/2025  1.No acute findings within the chest, abdomen or pelvis. 2.Small nonobstructing bilateral renal stones. Electronically Signed: Alejandra Snyder MD  5/22/2025 8:23 AM EDT  Workstation ID: [id number]    CT Chest Without Contrast Diagnostic  Result Date: 5/22/2025  1.No acute findings within the chest, abdomen or pelvis. 2.Small nonobstructing bilateral renal stones. Electronically Signed: Alejandra Snyder MD  5/22/2025 8:23 AM EDT  Workstation ID: [id number]    XR Chest 1 View  Result Date: 5/21/2025  No active disease. Electronically Signed: Crispin Maxwell MD  5/21/2025 6:03 PM EDT  Workstation ID: [id number]      Assessment & Plan   ASSESSMENT:  Leukopenia and thrombocytopenia: Ehrlichiosis?  I have been doubtful about it.  I am not sure what connection there would be between early ketosis and the headaches she describes.  In addition the cytopenias seem out of proportion to usual and she had had no fever prior to the admission.  However, I cannot find any other  explanation.  Awaiting the final report of the bone marrow biopsy but flow cytometry was negative.  Her absolute neutrophil count is at least not lower today than it was yesterday.  Continue antibiotics and continue to follow.  Headaches: Persistent.  Cause not clear.    PLAN:  As above.    Josef George MD on 5/23/2025 at 1748.

## 2025-05-23 NOTE — PROGRESS NOTES
[facility] MEDICINE SERVICE  DAILY PROGRESS NOTE    NAME: Angella Santos  : 1960  MRN: [id number]      LOS: 4 days     PROVIDER OF SERVICE: Layo Tapia MD    Chief Complaint: Leukopenia    Subjective:     Interval History:  History taken from: patient chart RN  Patient Complaints:     - Still complaining of the same headache on the left occipital area that is burning/shocks comes for only few seconds and resolves  - Having too many episode of those shocks   - Did not respond very well to Neurontin however did okay with Fioricet, but was concerned about taking it because it has Tylenol  - Afebrile overnight   - Discussed imaging studies/workup with the patient      - Still having some headache however definitely better than it was yesterday  - Developed a fever yesterday and patient was started empirically on cefepime, blood sample for culture was obtained  - Denied having any cough, no shortness of breath, no abdominal pain, no diarrhea      - Still having some headache  - Afebrile overnight  - Was seen by infectious disease started on doxycycline with improvement in her white blood cells  - Discussed with patient that bone marrow biopsy still pending    Objective:     Vital Signs  Temp:  [97.5 °F (36.4 °C)-101.2 °F (38.4 °C)] 97.5 °F (36.4 °C)  Heart Rate:  [67-83] 67  Resp:  [14-20] 20  BP: ()/(51-73) 100/61   Body mass index is 20.76 kg/m².    Physical Exam  General Appearance:  Alert, cooperative, no distress, appears stated age  Head:   Normocephalic, without obvious abnormality, atraumatic  Eyes:   PERRL, conjunctivae/corneas clear, EOM's intact, fundi benign, both eyes  Ears:    Normal TM's and external ear canals, both ears  Nose:   Nares normal, septum midline, mucosa normal, no drainage or sinus tenderness  Throat: Lips, mucosa, and tongue normal; teeth and gums normal  Neck:   Supple, symmetrical, trachea midline, no adenopathy, thyroid: not enlarged, symmetric, no  tenderness/mass/nodules, no carotid bruit or JVD  Lungs:             Clear to auscultation bilaterally, respirations unlabored  Heart:  Regular rate and rhythm, S1, S2 normal, no murmur, rub or gallop  Abdomen:  Soft, nontender, bowel sounds active all four quadrants,  no masses, no organomegaly  Extremities:     Extremities normal, atraumatic, no cyanosis or edema  Pulses:            2+ and symmetric  Skin:    Skin color, texture, turgor normal, no rashes or lesions  Neurologic: Normal    Scheduled Meds   doxycycline, 100 mg, Oral, Q12H  levETIRAcetam, 500 mg, Oral, Q12H  meropenem, 1,000 mg, Intravenous, Q8H  pantoprazole, 40 mg, Oral, QAM AC       PRN Meds     acetaminophen    baclofen    butalbital-acetaminophen-caffeine    Calcium Replacement - Follow Nurse / BPA Driven Protocol    gabapentin    ibuprofen    Magnesium Standard Dose Replacement - Follow Nurse / BPA Driven Protocol    melatonin    ondansetron ODT **OR** ondansetron    Phosphorus Replacement - Follow Nurse / BPA Driven Protocol    Potassium Replacement - Follow Nurse / BPA Driven Protocol    senna    [COMPLETED] Insert Peripheral IV **AND** sodium chloride   Infusions           Diagnostic Data    Results from last 7 days   Lab Units 05/23/25  0226   WBC 10*3/mm3 1.43*   HEMOGLOBIN g/dL 11.0*   HEMATOCRIT % 33.4*   PLATELETS 10*3/mm3 33*   GLUCOSE mg/dL 100*   CREATININE mg/dL 0.92   BUN mg/dL 8   SODIUM mmol/L 134*   POTASSIUM mmol/L 3.4*   AST (SGOT) U/L 614*   ALT (SGPT) U/L 536*   ALK PHOS U/L 519*   BILIRUBIN mg/dL 0.8   ANION GAP mmol/L 7.5       CT Abdomen Pelvis Without Contrast  Result Date: 5/22/2025  1.No acute findings within the chest, abdomen or pelvis. 2.Small nonobstructing bilateral renal stones. Electronically Signed: Alejandra Snyder MD  5/22/2025 8:23 AM EDT  Workstation ID: [id number]    CT Chest Without Contrast Diagnostic  Result Date: 5/22/2025  1.No acute findings within the chest, abdomen or pelvis. 2.Small nonobstructing  bilateral renal stones. Electronically Signed: Alejandra Snyder MD  5/22/2025 8:23 AM EDT  Workstation ID: [id number]    XR Chest 1 View  Result Date: 5/21/2025  No active disease. Electronically Signed: Crispin Maxwell MD  5/21/2025 6:03 PM EDT  Workstation ID: [id number]    CT Bone marrow biopsy and aspiration  Result Date: 5/21/2025  Impression: Technically successful CT-guided left posterior iliac 11-gauge bone marrow aspiration and core biopsy, as described. Electronically Signed: Jp Torres MD  5/21/2025 2:46 PM EDT  Workstation ID: [id number]        I reviewed the patient's new clinical results.    Assessment/Plan:     Active and Resolved Problems  Active Hospital Problems    Diagnosis  POA    **Leukopenia [D72.819]  Yes      Resolved Hospital Problems   No resolved problems to display.       Neutropenia  Thrombocytopenia   History of tick bite   Severe left parietal headache, possible atypical trigeminal neuralgia  Transaminitis  Neutropenic fever      - Evaluated by hematology, workup was initiated  - Patient underwent bone marrow biopsy yesterday, results are still pending  - Tick panel is pending  - Acute hepatitis are negative   - Patient developed fever yesterday and started on empiric antibiotics  - Blood samples was obtained for culture  - ID evaluated the patient with concern about ehrlichiosis , started on p.o. doxycycline, further workup was also initiated  - Exam was pretty benign no clear etiology for the source of infection yet  - Keep on neutropenic precautions    -Evaluated by neurology, imaging studies are negative for acute process she was started on Neurontin and baclofen yesterday however did not have much response according to her she did better with Fioricet  - Her description of the pain clearly seems to be neuropathic  - Had inconsistent response to the treatment with baclofen and Neurontin, evaluated again by neurology who believes that the patient might have atypical trigeminal neuralgia  and due to her leukopenia she is not arjun be a good candidate to start carbamazepine for which she was switched to Keppra    VTE Prophylaxis:  Mechanical VTE prophylaxis orders are present.         Code status is   Code Status and Medical Interventions: CPR (Attempt to Resuscitate); Limited Support; No artificial nutrition   Ordered at: 05/20/25 0116     Code Status (Patient has no pulse and is not breathing):    CPR (Attempt to Resuscitate)     Medical Interventions (Patient has pulse or is breathing):    Limited Support     Medical Intervention Limits:    No artificial nutrition       Plan for disposition: TBD     Time: 30 minutes    Signature: Electronically signed by Layo Tpaia MD, 05/23/25, 10:24 EDT.  [facility] Hospitalist Team

## 2025-05-24 LAB
ALBUMIN SERPL-MCNC: 3.4 G/DL (ref 3.5–5.2)
ALBUMIN/GLOB SERPL: 1.8 G/DL
ALP SERPL-CCNC: 550 U/L (ref 39–117)
ALT SERPL W P-5'-P-CCNC: 465 U/L (ref 1–33)
ANION GAP SERPL CALCULATED.3IONS-SCNC: 7.7 MMOL/L (ref 5–15)
AST SERPL-CCNC: 422 U/L (ref 1–32)
BILIRUB SERPL-MCNC: 0.6 MG/DL (ref 0–1.2)
BUN SERPL-MCNC: 9 MG/DL (ref 8–23)
BUN/CREAT SERPL: 9.7 (ref 7–25)
CALCIUM SPEC-SCNC: 8.1 MG/DL (ref 8.6–10.5)
CHLORIDE SERPL-SCNC: 105 MMOL/L (ref 98–107)
CO2 SERPL-SCNC: 23.3 MMOL/L (ref 22–29)
CREAT SERPL-MCNC: 0.93 MG/DL (ref 0.57–1)
DEPRECATED RDW RBC AUTO: 43.5 FL (ref 37–54)
EGFRCR SERPLBLD CKD-EPI 2021: 68.3 ML/MIN/1.73
ERYTHROCYTE [DISTWIDTH] IN BLOOD BY AUTOMATED COUNT: 12.9 % (ref 12.3–15.4)
GLOBULIN UR ELPH-MCNC: 1.9 GM/DL
GLUCOSE SERPL-MCNC: 97 MG/DL (ref 65–99)
HCT VFR BLD AUTO: 34.9 % (ref 34–46.6)
HGB BLD-MCNC: 11.5 G/DL (ref 12–15.9)
LYMPHOCYTES # BLD MANUAL: 1.24 10*3/MM3 (ref 0.7–3.1)
LYMPHOCYTES NFR BLD MANUAL: 13 % (ref 5–12)
MCH RBC QN AUTO: 30.3 PG (ref 26.6–33)
MCHC RBC AUTO-ENTMCNC: 33 G/DL (ref 31.5–35.7)
MCV RBC AUTO: 91.8 FL (ref 79–97)
MONOCYTES # BLD: 0.3 10*3/MM3 (ref 0.1–0.9)
MYELOCYTES NFR BLD MANUAL: 2 % (ref 0–0)
NEUTROPHILS # BLD AUTO: 0.69 10*3/MM3 (ref 1.7–7)
NEUTROPHILS NFR BLD MANUAL: 26 % (ref 42.7–76)
NEUTS BAND NFR BLD MANUAL: 4 % (ref 0–5)
PLASMA CELL PREC NFR BLD MANUAL: 1 % (ref 0–0)
PLATELET # BLD AUTO: 50 10*3/MM3 (ref 140–450)
PMV BLD AUTO: 12.3 FL (ref 6–12)
POTASSIUM SERPL-SCNC: 4.2 MMOL/L (ref 3.5–5.2)
PROT SERPL-MCNC: 5.3 G/DL (ref 6–8.5)
RBC # BLD AUTO: 3.8 10*6/MM3 (ref 3.77–5.28)
RBC MORPH BLD: NORMAL
SCAN SLIDE: NORMAL
SMALL PLATELETS BLD QL SMEAR: ABNORMAL
SODIUM SERPL-SCNC: 136 MMOL/L (ref 136–145)
VARIANT LYMPHS NFR BLD MANUAL: 47 % (ref 19.6–45.3)
VARIANT LYMPHS NFR BLD MANUAL: 7 % (ref 0–5)
WBC NRBC COR # BLD AUTO: 2.3 10*3/MM3 (ref 3.4–10.8)

## 2025-05-24 PROCEDURE — 25010000002 MEROPENEM PER 100 MG: Performed by: INTERNAL MEDICINE

## 2025-05-24 PROCEDURE — 80053 COMPREHEN METABOLIC PANEL: CPT

## 2025-05-24 PROCEDURE — 85025 COMPLETE CBC W/AUTO DIFF WBC: CPT | Performed by: INTERNAL MEDICINE

## 2025-05-24 PROCEDURE — 85007 BL SMEAR W/DIFF WBC COUNT: CPT | Performed by: INTERNAL MEDICINE

## 2025-05-24 RX ORDER — LEVETIRACETAM 500 MG/1
500 TABLET ORAL DAILY
Status: DISCONTINUED | OUTPATIENT
Start: 2025-05-25 | End: 2025-05-26 | Stop reason: HOSPADM

## 2025-05-24 RX ADMIN — MEROPENEM 1000 MG: 1 INJECTION INTRAVENOUS at 02:08

## 2025-05-24 RX ADMIN — IBUPROFEN 400 MG: 400 TABLET, FILM COATED ORAL at 20:48

## 2025-05-24 RX ADMIN — DOXYCYCLINE 100 MG: 100 CAPSULE ORAL at 20:48

## 2025-05-24 RX ADMIN — PANTOPRAZOLE SODIUM 40 MG: 40 TABLET, DELAYED RELEASE ORAL at 08:12

## 2025-05-24 RX ADMIN — DOXYCYCLINE 100 MG: 100 CAPSULE ORAL at 08:12

## 2025-05-24 RX ADMIN — MEROPENEM 1000 MG: 1 INJECTION INTRAVENOUS at 10:30

## 2025-05-24 RX ADMIN — MEROPENEM 1000 MG: 1 INJECTION INTRAVENOUS at 18:04

## 2025-05-24 RX ADMIN — LEVETIRACETAM 500 MG: 500 TABLET, FILM COATED ORAL at 08:12

## 2025-05-24 NOTE — PROGRESS NOTES
Patient doing well  Wants to taper off of Keppra  We will do 500 mg in the morning for 3 days and then stop    If she is to be discharged, my request is to give her at least 1 week supply, 14 doses, in case her symptoms recur that she should start taking the Keppra 5 mg twice daily and then contact her PCP to give her regular full prescription instead of coming to the ER

## 2025-05-24 NOTE — PROGRESS NOTES
[facility] MEDICINE SERVICE  DAILY PROGRESS NOTE    NAME: Angella Santos  : 1960  MRN: [id number]      LOS: 5 days     PROVIDER OF SERVICE: Faustino Kendrick MD    Chief Complaint: Leukopenia    Subjective:     Interval History:  History taken from: patient chart RN  Patient seen and examined at bedside, resting comfortably only complaining of mild headache, inquiring about if she can come off of Keppra and that she will discuss with neurology.  Appetite is returning.  She feels well overall.  Objective:     Vital Signs  Temp:  [97.6 °F (36.4 °C)-99 °F (37.2 °C)] 98.2 °F (36.8 °C)  Heart Rate:  [62-70] 62  Resp:  [10-20] 16  BP: (88-98)/(54-68) 88/61   Body mass index is 20.76 kg/m².    Physical Exam  General Appearance:  Alert, cooperative, no distress, appears stated age  Head:   Normocephalic  Neck:   Supple  Lungs: respirations unlabored  Heart:  Regular rate and rhythm  Abdomen:  Soft, nontender  Pulses2+ and symmetric  Skin: no rashes or lesions  Neurologic: Normal    Scheduled Meds   doxycycline, 100 mg, Oral, Q12H  [START ON 2025] levETIRAcetam, 500 mg, Oral, Daily  meropenem, 1,000 mg, Intravenous, Q8H  pantoprazole, 40 mg, Oral, QAM AC       PRN Meds     acetaminophen    baclofen    butalbital-acetaminophen-caffeine    Calcium Replacement - Follow Nurse / BPA Driven Protocol    gabapentin    ibuprofen    Magnesium Standard Dose Replacement - Follow Nurse / BPA Driven Protocol    melatonin    ondansetron ODT **OR** ondansetron    Phosphorus Replacement - Follow Nurse / BPA Driven Protocol    Potassium Replacement - Follow Nurse / BPA Driven Protocol    senna    [COMPLETED] Insert Peripheral IV **AND** sodium chloride   Infusions           Diagnostic Data    Results from last 7 days   Lab Units 25  0216   WBC 10*3/mm3 2.30*   HEMOGLOBIN g/dL 11.5*   HEMATOCRIT % 34.9   PLATELETS 10*3/mm3 50*   GLUCOSE mg/dL 97   CREATININE mg/dL 0.93   BUN mg/dL 9   SODIUM mmol/L 136   POTASSIUM mmol/L  4.2   AST (SGOT) U/L 422*   ALT (SGPT) U/L 465*   ALK PHOS U/L 550*   BILIRUBIN mg/dL 0.6   ANION GAP mmol/L 7.7       No radiology results for the last day        I reviewed the patient's new clinical results.    Assessment/Plan:     Active and Resolved Problems  Active Hospital Problems    Diagnosis  POA    **Leukopenia [D72.819]  Yes      Resolved Hospital Problems   No resolved problems to display.       Neutropenia  Thrombocytopenia   History of tick bite   Severe left parietal headache, possible atypical trigeminal neuralgia  Transaminitis  Neutropenic fever      - Evaluated by hematology, workup was initiated  - Patient underwent bone marrow biopsy yesterday, results are still pending  - Tick panel is pending  - Acute hepatitis are negative   - Patient developed fever yesterday and started on empiric antibiotics  - Blood samples was obtained for culture  - ID evaluated the patient with concern about ehrlichiosis , started on p.o. doxycycline, further workup was also initiated  - Exam was pretty benign no clear etiology for the source of infection yet  - Keep on neutropenic precautions    -Evaluated by neurology, imaging studies are negative for acute process she was started on Neurontin and baclofen yesterday however did not have much response according to her she did better with Fioricet  - Her description of the pain clearly seems to be neuropathic  - Had inconsistent response to the treatment with baclofen and Neurontin, evaluated again by neurology who believes that the patient might have atypical trigeminal neuralgia and due to her leukopenia she is not arjun be a good candidate to start carbamazepine for which she was switched to Keppra    VTE Prophylaxis:  Mechanical VTE prophylaxis orders are present.         Code status is   Code Status and Medical Interventions: CPR (Attempt to Resuscitate); Limited Support; No artificial nutrition   Ordered at: 05/20/25 0116     Code Status (Patient has no pulse and  is not breathing):    CPR (Attempt to Resuscitate)     Medical Interventions (Patient has pulse or is breathing):    Limited Support     Medical Intervention Limits:    No artificial nutrition       Plan for disposition: 2-3 days    Time: 30 minutes    Signature: Electronically signed by Faustino Kendrick MD, 05/24/25, 12:50 EDT.  [facility]

## 2025-05-24 NOTE — PROGRESS NOTES
Infectious Diseases Progress Note      LOS: 5 days   Patient Care Team:  Nikhil Maloney MD as PCP - General  Sherry Negrete, BARBARA as Ambulatory  (Oncology) ([facility])    Chief Complaint: Headache, weakness    Subjective       Patient had a high-grade fever yesterday afternoon but has been afebrile since.  She is hemodynamically stable and tolerating antimicrobial therapy.  Symptoms and headache have improved      Review of Systems:   Review of Systems   Eyes: Negative.    Respiratory: Negative.     Cardiovascular: Negative.    Gastrointestinal: Negative.    Endocrine: Negative.    Genitourinary: Negative.    Musculoskeletal: Negative.    Skin: Negative.    Neurological:  Positive for weakness and headaches.   Psychiatric/Behavioral: Negative.     All other systems reviewed and are negative.       Objective     Vital Signs  Temp:  [97.6 °F (36.4 °C)-99 °F (37.2 °C)] 98.2 °F (36.8 °C)  Heart Rate:  [62-70] 62  Resp:  [10-20] 16  BP: ()/(54-68) 88/61    Physical Exam:  Physical Exam  Vitals and nursing note reviewed.   Constitutional:       General: She is not in acute distress.     Appearance: She is well-developed and normal weight. She is ill-appearing. She is not diaphoretic.   HENT:      Head: Normocephalic and atraumatic.   Eyes:      General: No scleral icterus.     Extraocular Movements: Extraocular movements intact.      Conjunctiva/sclera: Conjunctivae normal.      Pupils: Pupils are equal, round, and reactive to light.   Cardiovascular:      Rate and Rhythm: Normal rate and regular rhythm.      Heart sounds: Normal heart sounds, S1 normal and S2 normal. No murmur heard.  Pulmonary:      Effort: Pulmonary effort is normal. No respiratory distress.      Breath sounds: Normal breath sounds. No stridor. No wheezing or rales.   Chest:      Chest wall: No tenderness.   Abdominal:      General: Bowel sounds are normal. There is no distension.      Palpations: Abdomen is soft. There is no  mass.      Tenderness: There is no abdominal tenderness. There is no guarding.   Musculoskeletal:         General: No swelling, tenderness or deformity. Normal range of motion.      Cervical back: Neck supple.   Skin:     General: Skin is warm and dry.      Coloration: Skin is not pale.      Findings: No bruising, erythema or rash.   Neurological:      General: No focal deficit present.      Mental Status: She is alert and oriented to person, place, and time. Mental status is at baseline.      Cranial Nerves: No cranial nerve deficit.   Psychiatric:         Mood and Affect: Mood normal.          Results Review:    I have reviewed all clinical data, test, lab, and imaging results.     Radiology  No Radiology Exams Resulted Within Past 24 Hours    Cardiology    Laboratory    Results from last 7 days   Lab Units 05/24/25  0216 05/23/25  0226 05/22/25  0050 05/21/25 0551 05/19/25 2353 05/19/25 2016   WBC 10*3/mm3 2.30* 1.43* 0.80* 1.04* 1.53* 1.64*   HEMOGLOBIN g/dL 11.5* 11.0* 11.7* 13.1 13.6 13.6   HEMATOCRIT % 34.9 33.4* 36.2 40.3 40.8 41.6   PLATELETS 10*3/mm3 50* 33* 37* 53* 84* 97*     Results from last 7 days   Lab Units 05/24/25  0216 05/23/25  1313 05/23/25  0226 05/22/25 0050 05/21/25 0551 05/19/25 2353 05/19/25 2016   SODIUM mmol/L 136  --  134* 131* 137 137 132*   POTASSIUM mmol/L 4.2 4.6 3.4* 3.8 5.0 4.7 4.4   CHLORIDE mmol/L 105  --  105 99 103 104 99   CO2 mmol/L 23.3  --  21.5* 24.1 24.1 23.9 23.8   BUN mg/dL 9  --  8 12 10 13 16   CREATININE mg/dL 0.93  --  0.92 0.94 1.01* 0.90 0.99   GLUCOSE mg/dL 97  --  100* 123* 99 134* 164*   ALBUMIN g/dL 3.4*  --  3.2* 3.6 3.9 4.0 4.3   BILIRUBIN mg/dL 0.6  --  0.8 1.0 0.8 0.5 0.5   ALK PHOS U/L 550*  --  519* 508* 409* 181* 171*   AST (SGOT) U/L 422*  --  614* 425* 177* 166* 140*   ALT (SGPT) U/L 465*  --  536* 361* 175* 140* 132*   CALCIUM mg/dL 8.1*  --  7.4* 8.1* 9.0 9.0 9.2                 Microbiology   Microbiology Results (last 10 days)        Procedure Component Value - Date/Time    MRSA Screen, PCR (Inpatient) - Swab, Nares [[id number]]  (Normal) Collected: 05/22/25 0817    Lab Status: Final result Specimen: Swab from Nares Updated: 05/22/25 1003     MRSA PCR No MRSA Detected    Narrative:      The negative predictive value of this diagnostic test is high and should only be used to consider de-escalating anti-MRSA therapy. A positive result may indicate colonization with MRSA and must be correlated clinically.    Blood Culture - Blood, Arm, Left [[id number]]  (Normal) Collected: 05/21/25 1805    Lab Status: Preliminary result Specimen: Blood from Arm, Left Updated: 05/23/25 1831     Blood Culture No growth at 2 days    Respiratory Panel PCR w/COVID-19(SARS-CoV-2) JL/IVONNE/ZAYNAB/PAD/COR/STACIA In-House, NP Swab in UTM/VTM, 2 HR TAT - Swab, Nasopharynx [[id number]]  (Normal) Collected: 05/21/25 1709    Lab Status: Final result Specimen: Swab from Nasopharynx Updated: 05/21/25 1922     ADENOVIRUS, PCR Not Detected     Coronavirus 229E Not Detected     Coronavirus HKU1 Not Detected     Coronavirus NL63 Not Detected     Coronavirus OC43 Not Detected     COVID19 Not Detected     Human Metapneumovirus Not Detected     Human Rhinovirus/Enterovirus Not Detected     Influenza A PCR Not Detected     Influenza B PCR Not Detected     Parainfluenza Virus 1 Not Detected     Parainfluenza Virus 2 Not Detected     Parainfluenza Virus 3 Not Detected     Parainfluenza Virus 4 Not Detected     RSV, PCR Not Detected     Bordetella pertussis pcr Not Detected     Bordetella parapertussis PCR Not Detected     Chlamydophila pneumoniae PCR Not Detected     Mycoplasma pneumo by PCR Not Detected    Narrative:      In the setting of a positive respiratory panel with a viral infection PLUS a negative procalcitonin without other underlying concern for bacterial infection, consider observing off antibiotics or discontinuation of antibiotics and continue supportive care. If the respiratory  panel is positive for atypical bacterial infection (Bordetella pertussis, Chlamydophila pneumoniae, or Mycoplasma pneumoniae), consider antibiotic de-escalation to target atypical bacterial infection.    Blood Culture - Blood, Arm, Left [[id number]]  (Normal) Collected: 05/21/25 1706    Lab Status: Preliminary result Specimen: Blood from Arm, Left Updated: 05/23/25 1831     Blood Culture No growth at 2 days            Medication Review:       Schedule Meds  doxycycline, 100 mg, Oral, Q12H  levETIRAcetam, 500 mg, Oral, Q12H  meropenem, 1,000 mg, Intravenous, Q8H  pantoprazole, 40 mg, Oral, QAM AC        Infusion Meds       PRN Meds    acetaminophen    baclofen    butalbital-acetaminophen-caffeine    Calcium Replacement - Follow Nurse / BPA Driven Protocol    gabapentin    ibuprofen    Magnesium Standard Dose Replacement - Follow Nurse / BPA Driven Protocol    melatonin    ondansetron ODT **OR** ondansetron    Phosphorus Replacement - Follow Nurse / BPA Driven Protocol    Potassium Replacement - Follow Nurse / BPA Driven Protocol    senna    [COMPLETED] Insert Peripheral IV **AND** sodium chloride        Assessment & Plan       Antimicrobial Therapy   1.  P.o. doxycycline        2.        3.        4.        5.            Assessment     Febrile illness associated with pancytopenia and elevated transaminase with tick exposure.  Presentation is highly concerning for tickborne infection such as ehrlichiosis.  Other possibility will be viral infection such as CMV or Frank-Barr virus infection.  Malignancy is in the differential diagnosis but less likely.  Cytomegalovirus IgM and PCR were negative.  Frank-Morris IgM  and PCR were negative     Severe neutropenia.  The patient is not known to be on immunosuppressive therapy.  The patient is being followed by hematology service and status post bone marrow biopsy     Plan     Continue p.o. doxycycline 100 mg twice daily     Continue IV meropenem since patient had neutropenic  fever.  Can discontinue when neutropenia resolved     Serum for Ehrlichia PCR-pending  Waiting on bone marrow biopsy results  Continue supportive care  A.m. labs  Case discussed with patient and family members at the bedside  Case discussed with neurology  Case discussed with hospitalist at bedside    Shayla Gonsalves, ARABELLA  05/24/25  12:02 EDT    Note is dictated utilizing voice recognition software/Dragon

## 2025-05-24 NOTE — PLAN OF CARE
Problem: Adult Inpatient Plan of Care  Goal: Plan of Care Review  Outcome: Progressing  Flowsheets (Taken 5/24/2025 0537)  Progress: no change  Plan of Care Reviewed With: patient  Goal: Patient-Specific Goal (Individualized)  Outcome: Progressing  Goal: Absence of Hospital-Acquired Illness or Injury  Outcome: Progressing  Intervention: Identify and Manage Fall Risk  Description: Perform standard risk assessment on admission using a validated tool or comprehensive approach appropriate to the patient; reassess fall risk frequently, with change in status or transfer to another level of care.Communicate risk to interprofessional healthcare team; ensure fall risk visible cue.Determine need for increased observation, equipment and environmental modification, as well as use of supportive, nonskid footwear.Adjust safety measures to individual needs and identified risk factors.Reinforce the importance of active participation with fall risk prevention, safety, and physical activity with the patient and family.Perform regular intentional rounding to assess need for position change, pain assessment and personal needs, including assistance with toileting.  Recent Flowsheet Documentation  Taken 5/24/2025 0208 by Yosef Acosta LPN  Safety Promotion/Fall Prevention:   assistive device/personal items within reach   clutter free environment maintained   room organization consistent   safety round/check completed  Taken 5/24/2025 0008 by Yosef Acosta LPN  Safety Promotion/Fall Prevention:   assistive device/personal items within reach   clutter free environment maintained  Taken 5/23/2025 2240 by Yosef Acosta LPN  Safety Promotion/Fall Prevention:   assistive device/personal items within reach   clutter free environment maintained   room organization consistent   safety round/check completed  Taken 5/23/2025 2044 by Yosef Acosta LPN  Safety Promotion/Fall Prevention:   assistive device/personal items within reach    clutter free environment maintained   room organization consistent   safety round/check completed  Intervention: Prevent Skin Injury  Description: Perform a screening for skin injury risk, such as pressure or moisture-associated skin damage on admission and at regular intervals throughout hospital stay.Keep all areas of skin (especially folds) clean and dry.Maintain adequate skin hydration.Relieve and redistribute pressure and protect bony prominences and skin at risk for injury; implement measures based on patient-specific risk factors.Match turning and repositioning schedule to clinical condition.Encourage weight shift frequently; assist with reposition if unable to complete independently.Float heels off bed; avoid pressure on the Achilles tendon.Keep skin free from extended contact with medical devices.Optimize nutrition and hydration.Encourage functional activity and mobility, as early as tolerated.Use aids (e.g., slide boards, mechanical lift) during transfer.  Recent Flowsheet Documentation  Taken 5/24/2025 0208 by Yosef Acosta LPN  Body Position:   position changed independently   left  Taken 5/24/2025 0008 by Yosef Acosta LPN  Body Position:   position changed independently   right  Taken 5/23/2025 2240 by Yosef Acosta LPN  Body Position:   position changed independently   left  Taken 5/23/2025 2044 by Yosef Acosta LPN  Body Position:   position changed independently   left  Intervention: Prevent and Manage VTE (Venous Thromboembolism) Risk  Description: Assess for VTE (venous thromboembolism) risk.Promote early mobilization; encourage both active and passive leg exercises, if unable to ambulate.Initiate and maintain compression or other therapy, as indicated, based on identified risk in accordance with organizational protocol and provider order.Recognize the patient's individual risk for bleeding before initiating pharmacologic thromboprophylaxis.  Recent Flowsheet Documentation  Taken  5/23/2025 2044 by Yosef Acosta LPN  VTE Prevention/Management:   SCDs (sequential compression devices) off   patient refused intervention  Intervention: Prevent Infection  Description: Maintain skin and mucous membrane integrity; promote hand, oral and pulmonary hygiene.Optimize fluid balance, nutrition, sleep and glycemic control to maximize infection resistance.Identify potential sources of infection early to prevent or mitigate progression of infection (e.g., wound, lines, devices).Evaluate ongoing need for invasive devices; remove promptly when no longer indicated.Review vaccination status.  Recent Flowsheet Documentation  Taken 5/24/2025 0208 by Yosef Acosta LPN  Infection Prevention: rest/sleep promoted  Taken 5/24/2025 0008 by Yosef Acosta LPN  Infection Prevention: single patient room provided  Taken 5/23/2025 2240 by Yosef Acosta LPN  Infection Prevention: single patient room provided  Taken 5/23/2025 2044 by Yosef Acosta LPN  Infection Prevention: single patient room provided  Goal: Optimal Comfort and Wellbeing  Outcome: Progressing  Intervention: Provide Person-Centered Care  Description: Use a family-focused approach to care; encourage support system presence and participation.Develop trust and rapport by proactively providing information, encouraging questions, addressing concerns and offering reassurance.Acknowledge emotional response to hospitalization.Recognize and utilize personal coping strategies and strengths; develop goals via shared decision-making.Honor spiritual and cultural preferences.  Recent Flowsheet Documentation  Taken 5/24/2025 0008 by Yosef Acosta LPN  Trust Relationship/Rapport:   care explained   thoughts/feelings acknowledged   reassurance provided   questions encouraged   empathic listening provided  Taken 5/23/2025 2044 by Yosef Acosta LPN  Trust Relationship/Rapport:   care explained   thoughts/feelings acknowledged   reassurance provided    questions encouraged  Goal: Readiness for Transition of Care  Outcome: Progressing     Problem: Infection  Goal: Absence of Infection Signs and Symptoms  Outcome: Progressing  Intervention: Prevent or Manage Infection  Description: Promote early source control; implement transmission-based precautions and isolation, as indicated, to prevent spread of infection.Obtain cultures prior to initiating antimicrobial therapy, when possible. Do not delay treatment for laboratory results in the presence of high suspicion or clinical indicators.Administer ordered antimicrobial therapy promptly; reassess need regularly.Monitor laboratory value, diagnostic test and clinical status trends for signs of infection progression.Identify early signs of sepsis, such as increased heart rate and decreased blood pressure, as well as changes in mental state, respiratory pattern or peripheral perfusion.Prepare for rapid sepsis management, including lactate level, intravenous access, fluid administration and oxygen therapy.Provide fever-reduction and comfort measures.Promote antimicrobial stewardship.  Recent Flowsheet Documentation  Taken 5/24/2025 0208 by Yosef Acosta LPN  Isolation Precautions:   protective   precautions maintained  Taken 5/24/2025 0008 by Yosef Acosta LPN  Isolation Precautions:   protective   precautions maintained  Taken 5/23/2025 2240 by Yosef Acosta LPN  Isolation Precautions:   protective   precautions maintained  Taken 5/23/2025 2044 by Yosef Acosta LPN  Isolation Precautions:   protective   precautions maintained     Problem: Fall Injury Risk  Goal: Absence of Fall and Fall-Related Injury  Outcome: Progressing  Intervention: Identify and Manage Contributors  Description: Develop a fall prevention plan, considering patient-centered interventions and family/caregiver involvement; identify and address patient's facilitators and barriers.Provide reorientation, appropriate sensory stimulation and  routines with changes in mental status to decrease risk of fall.Promote use of personal vision and auditory aids.Assess assistance level required for safe and effective self-care; provide support as needed, such as toileting and mobilization. For age 65 and older, implement timed toileting with assistance.Encourage physical activity, such as performance of mobility and self-care at highest level of patient ability, multicomponent exercise program and provision of appropriate assistive devices.If fall occurs, assess the severity of injury; implement fall injury protocol. Determine the cause and revise fall injury prevention plan.Regularly review and advocate for medication adjustment to decrease fall risk; consider administration times, polypharmacy and age.Balance adequate pain management with potential for oversedation.  Recent Flowsheet Documentation  Taken 5/24/2025 0208 by Yosef Acosta LPN  Medication Review/Management: medications reviewed  Taken 5/24/2025 0008 by Yosef Acosta LPN  Medication Review/Management: medications reviewed  Taken 5/23/2025 2240 by Yosef Acosta LPN  Medication Review/Management: medications reviewed  Taken 5/23/2025 2044 by Yosef Acosta LPN  Medication Review/Management: medications reviewed  Intervention: Promote Injury-Free Environment  Description: Provide a safe, barrier-free environment that encourages independent activity.Keep care area uncluttered and well-lighted.Determine need for increased observation or monitoring.Avoid use of devices that minimize mobility, such as restraints or indwelling urinary catheter.  Recent Flowsheet Documentation  Taken 5/24/2025 0208 by Yosef Acosta LPN  Safety Promotion/Fall Prevention:   assistive device/personal items within reach   clutter free environment maintained   room organization consistent   safety round/check completed  Taken 5/24/2025 0008 by Yosef Acosta LPN  Safety Promotion/Fall Prevention:   assistive  device/personal items within reach   clutter free environment maintained  Taken 5/23/2025 2240 by Yosef Acosta LPN  Safety Promotion/Fall Prevention:   assistive device/personal items within reach   clutter free environment maintained   room organization consistent   safety round/check completed  Taken 5/23/2025 2044 by Yosef Acosta LPN  Safety Promotion/Fall Prevention:   assistive device/personal items within reach   clutter free environment maintained   room organization consistent   safety round/check completed     Problem: Sepsis/Septic Shock  Goal: Optimal Coping  Outcome: Progressing  Intervention: Support Patient and Family Response  Description: Acknowledge, normalize, validate intensity and complexity of patient and support system response to situation.Provide opportunity for expression of thoughts, feelings and concerns; respond with compassion and reassurance.Decrease stress and anxiety by providing information about patient's status and treatment.Facilitate support system presence and participation in care; consider providing a diary in intensive care situation.Support coping by recognizing current coping strategies; provide aid in developing new strategies.Assess and monitor for signs and symptoms of psychologic distress, anxiety and depression.Utilize complementary therapy, such as music, massage or guided imagery.Engage in proactive and ongoing discussion about goals of care and facilitate shared decision-making.Connect with community resources for ongoing support, such as counseling and group support.  Recent Flowsheet Documentation  Taken 5/24/2025 0008 by Yosef Acosta LPN  Supportive Measures: active listening utilized  Family/Support System Care: self-care encouraged  Taken 5/23/2025 2044 by Yosef Acosta LPN  Supportive Measures: active listening utilized  Family/Support System Care: self-care encouraged  Goal: Absence of Bleeding  Outcome: Progressing  Goal: Blood Glucose Level  Within Target Range  Outcome: Progressing  Goal: Absence of Infection Signs and Symptoms  Outcome: Progressing  Intervention: Initiate Sepsis Management  Description: Provide fluid therapy, such as crystalloid or albumin, to increase intravascular volume, organ perfusion and oxygen delivery.Provide respiratory support, such as oxygen therapy, noninvasive or invasive positive pressure ventilation, to achieve oxygenation and ventilation goal; avoid hyperoxemia.Obtain cultures prior to initiating antimicrobial therapy when possible. Do not delay treatment for laboratory results in the presence of high suspicion or clinical indicators.Administer intravenous broad-spectrum antimicrobial therapy promptly.Implement hemodynamic monitoring to guide intravascular support based on individual targeted parameters.Determine and address underlying source of infection aggressively; implement transmission-based precautions and isolation, as indicated.  Recent Flowsheet Documentation  Taken 5/24/2025 0208 by Yosef Acosta LPN  Infection Prevention: rest/sleep promoted  Isolation Precautions:   protective   precautions maintained  Taken 5/24/2025 0008 by Yosef Acosta LPN  Infection Prevention: single patient room provided  Isolation Precautions:   protective   precautions maintained  Taken 5/23/2025 2240 by Yosef Acosta LPN  Infection Prevention: single patient room provided  Isolation Precautions:   protective   precautions maintained  Taken 5/23/2025 2044 by Yosef Acosta LPN  Infection Prevention: single patient room provided  Isolation Precautions:   protective   precautions maintained  Intervention: Promote Recovery  Description: Encourage pulmonary hygiene, such as cough-enhancement and airway-clearance techniques, that may include use of incentive spirometry, deep breathing and cough.Encourage early rehabilitation and physical activity to optimize functional ability and activity tolerance, as well as minimize  delirium.Promote energy conservation; minimize oxygen demand and consumption by adjusting environment, decreasing stimulation, maintaining normothermia and treating pain.Optimize fluid balance, nutrition intake, sleep and glycemic control to maintain tissue perfusion and enhance immune response.  Recent Flowsheet Documentation  Taken 5/24/2025 0208 by Yosef Acosta LPN  Activity Management: activity encouraged  Taken 5/24/2025 0008 by Yosef Acosta LPN  Activity Management: activity encouraged  Taken 5/23/2025 2240 by Yosef Acosta LPN  Activity Management: activity encouraged  Taken 5/23/2025 2044 by Yosef Acosta LPN  Activity Management: activity encouraged  Goal: Optimal Nutrition Delivery  Outcome: Progressing   Goal Outcome Evaluation:  Plan of Care Reviewed With: patient        Progress: no change

## 2025-05-24 NOTE — PLAN OF CARE
Goal Outcome Evaluation:              Outcome Evaluation: alert and able to make needs known, con't on abx. call light in reach

## 2025-05-25 LAB
ALBUMIN SERPL-MCNC: 3.4 G/DL (ref 3.5–5.2)
ALBUMIN/GLOB SERPL: 1.6 G/DL
ALP SERPL-CCNC: 547 U/L (ref 39–117)
ALT SERPL W P-5'-P-CCNC: 407 U/L (ref 1–33)
ANION GAP SERPL CALCULATED.3IONS-SCNC: 8.7 MMOL/L (ref 5–15)
AST SERPL-CCNC: 353 U/L (ref 1–32)
BASOPHILS # BLD MANUAL: 0.07 10*3/MM3 (ref 0–0.2)
BASOPHILS NFR BLD MANUAL: 2 % (ref 0–1.5)
BILIRUB SERPL-MCNC: 0.4 MG/DL (ref 0–1.2)
BUN SERPL-MCNC: 13 MG/DL (ref 8–23)
BUN/CREAT SERPL: 16.5 (ref 7–25)
CALCIUM SPEC-SCNC: 8.5 MG/DL (ref 8.6–10.5)
CHLORIDE SERPL-SCNC: 107 MMOL/L (ref 98–107)
CO2 SERPL-SCNC: 25.3 MMOL/L (ref 22–29)
CREAT SERPL-MCNC: 0.79 MG/DL (ref 0.57–1)
DEPRECATED RDW RBC AUTO: 44.8 FL (ref 37–54)
EGFRCR SERPLBLD CKD-EPI 2021: 83.1 ML/MIN/1.73
EOSINOPHIL # BLD MANUAL: 0.03 10*3/MM3 (ref 0–0.4)
EOSINOPHIL NFR BLD MANUAL: 1 % (ref 0.3–6.2)
ERYTHROCYTE [DISTWIDTH] IN BLOOD BY AUTOMATED COUNT: 13.2 % (ref 12.3–15.4)
GLOBULIN UR ELPH-MCNC: 2.1 GM/DL
GLUCOSE SERPL-MCNC: 103 MG/DL (ref 65–99)
HCT VFR BLD AUTO: 34.1 % (ref 34–46.6)
HGB BLD-MCNC: 11.1 G/DL (ref 12–15.9)
LARGE PLATELETS: ABNORMAL
LYMPHOCYTES # BLD MANUAL: 2.03 10*3/MM3 (ref 0.7–3.1)
LYMPHOCYTES NFR BLD MANUAL: 11 % (ref 5–12)
MCH RBC QN AUTO: 30 PG (ref 26.6–33)
MCHC RBC AUTO-ENTMCNC: 32.6 G/DL (ref 31.5–35.7)
MCV RBC AUTO: 92.2 FL (ref 79–97)
MONOCYTES # BLD: 0.37 10*3/MM3 (ref 0.1–0.9)
NEUTROPHILS # BLD AUTO: 0.85 10*3/MM3 (ref 1.7–7)
NEUTROPHILS NFR BLD MANUAL: 23 % (ref 42.7–76)
NEUTS BAND NFR BLD MANUAL: 2 % (ref 0–5)
PLASMA CELL PREC NFR BLD MANUAL: 1 % (ref 0–0)
PLATELET # BLD AUTO: 74 10*3/MM3 (ref 140–450)
PMV BLD AUTO: 11.6 FL (ref 6–12)
POTASSIUM SERPL-SCNC: 4.6 MMOL/L (ref 3.5–5.2)
PROT SERPL-MCNC: 5.5 G/DL (ref 6–8.5)
RBC # BLD AUTO: 3.7 10*6/MM3 (ref 3.77–5.28)
RBC MORPH BLD: NORMAL
SCAN SLIDE: NORMAL
SMALL PLATELETS BLD QL SMEAR: ABNORMAL
SODIUM SERPL-SCNC: 141 MMOL/L (ref 136–145)
VARIANT LYMPHS NFR BLD MANUAL: 53 % (ref 19.6–45.3)
VARIANT LYMPHS NFR BLD MANUAL: 7 % (ref 0–5)
WBC NRBC COR # BLD AUTO: 3.38 10*3/MM3 (ref 3.4–10.8)

## 2025-05-25 PROCEDURE — 85025 COMPLETE CBC W/AUTO DIFF WBC: CPT | Performed by: INTERNAL MEDICINE

## 2025-05-25 PROCEDURE — 25010000002 MEROPENEM PER 100 MG: Performed by: INTERNAL MEDICINE

## 2025-05-25 PROCEDURE — 80053 COMPREHEN METABOLIC PANEL: CPT

## 2025-05-25 PROCEDURE — 85007 BL SMEAR W/DIFF WBC COUNT: CPT | Performed by: INTERNAL MEDICINE

## 2025-05-25 PROCEDURE — 99232 SBSQ HOSP IP/OBS MODERATE 35: CPT | Performed by: INTERNAL MEDICINE

## 2025-05-25 RX ORDER — POLYETHYLENE GLYCOL 3350 17 G/17G
17 POWDER, FOR SOLUTION ORAL DAILY PRN
Status: DISCONTINUED | OUTPATIENT
Start: 2025-05-25 | End: 2025-05-26 | Stop reason: HOSPADM

## 2025-05-25 RX ADMIN — LEVETIRACETAM 500 MG: 500 TABLET, FILM COATED ORAL at 08:15

## 2025-05-25 RX ADMIN — DOXYCYCLINE 100 MG: 100 CAPSULE ORAL at 21:01

## 2025-05-25 RX ADMIN — IBUPROFEN 400 MG: 400 TABLET, FILM COATED ORAL at 17:45

## 2025-05-25 RX ADMIN — MEROPENEM 1000 MG: 1 INJECTION INTRAVENOUS at 10:17

## 2025-05-25 RX ADMIN — BUTALBITAL, ACETAMINOPHEN, AND CAFFEINE 1 TABLET: 325; 50; 40 TABLET ORAL at 21:01

## 2025-05-25 RX ADMIN — PANTOPRAZOLE SODIUM 40 MG: 40 TABLET, DELAYED RELEASE ORAL at 08:15

## 2025-05-25 RX ADMIN — MEROPENEM 1000 MG: 1 INJECTION INTRAVENOUS at 03:05

## 2025-05-25 RX ADMIN — DOXYCYCLINE 100 MG: 100 CAPSULE ORAL at 08:15

## 2025-05-25 RX ADMIN — POLYETHYLENE GLYCOL 3350 17 G: 17 POWDER, FOR SOLUTION ORAL at 13:58

## 2025-05-25 NOTE — PROGRESS NOTES
[facility] MEDICINE SERVICE  DAILY PROGRESS NOTE    NAME: Angella Santos  : 1960  MRN: [id number]      LOS: 6 days     PROVIDER OF SERVICE: Faustino Kendrick MD    Chief Complaint: Leukopenia    Subjective:     Interval History:  History taken from: patient chart RN  Patient seen and examined at bedside, resting comfortably voices no concerns at this time.  Objective:     Vital Signs  Temp:  [98.5 °F (36.9 °C)-98.9 °F (37.2 °C)] 98.7 °F (37.1 °C)  Heart Rate:  [60-69] 60  Resp:  [16-18] 18  BP: ()/(57-75) 113/75   Body mass index is 20.76 kg/m².    Physical Exam  General Appearance:  Alert, cooperative, no distress, appears stated age  Head:   Normocephalic  Neck:   Supple  Lungs: respirations unlabored  Heart:  Regular rate and rhythm  Abdomen:  Soft, nontender  Pulses2+ and symmetric  Skin: no rashes or lesions  Neurologic: Normal    Scheduled Meds   doxycycline, 100 mg, Oral, Q12H  levETIRAcetam, 500 mg, Oral, Daily  meropenem, 1,000 mg, Intravenous, Q8H  pantoprazole, 40 mg, Oral, QAM AC       PRN Meds     acetaminophen    baclofen    butalbital-acetaminophen-caffeine    Calcium Replacement - Follow Nurse / BPA Driven Protocol    gabapentin    ibuprofen    Magnesium Standard Dose Replacement - Follow Nurse / BPA Driven Protocol    melatonin    ondansetron ODT **OR** ondansetron    Phosphorus Replacement - Follow Nurse / BPA Driven Protocol    polyethylene glycol    Potassium Replacement - Follow Nurse / BPA Driven Protocol    senna    [COMPLETED] Insert Peripheral IV **AND** sodium chloride   Infusions           Diagnostic Data    Results from last 7 days   Lab Units 25  0541   WBC 10*3/mm3 3.38*   HEMOGLOBIN g/dL 11.1*   HEMATOCRIT % 34.1   PLATELETS 10*3/mm3 74*   GLUCOSE mg/dL 103*   CREATININE mg/dL 0.79   BUN mg/dL 13   SODIUM mmol/L 141   POTASSIUM mmol/L 4.6   AST (SGOT) U/L 353*   ALT (SGPT) U/L 407*   ALK PHOS U/L 547*   BILIRUBIN mg/dL 0.4   ANION GAP mmol/L 8.7       No  radiology results for the last day        I reviewed the patient's new clinical results.    Assessment/Plan:     Active and Resolved Problems  Active Hospital Problems    Diagnosis  POA    **Leukopenia [D72.819]  Yes      Resolved Hospital Problems   No resolved problems to display.       Neutropenia  Thrombocytopenia   History of tick bite   Severe left parietal headache, possible atypical trigeminal neuralgia  Transaminitis  Neutropenic fever      - Evaluated by hematology, feel this is secondary to Ehrlichiosis   - Patient underwent bone marrow biopsy, results are still pending  - Tick panel is pending  - Acute hepatitis are negative   - Blood samples was obtained for culture  - ID evaluated the patient with concern about ehrlichiosis , started on p.o. doxycycline, further workup was also initiated  - Keep on neutropenic precautions  - Neurology started to taper her off of Keppra.    VTE Prophylaxis:  Mechanical VTE prophylaxis orders are present.         Code status is   Code Status and Medical Interventions: CPR (Attempt to Resuscitate); Limited Support; No artificial nutrition   Ordered at: 05/20/25 0116     Code Status (Patient has no pulse and is not breathing):    CPR (Attempt to Resuscitate)     Medical Interventions (Patient has pulse or is breathing):    Limited Support     Medical Intervention Limits:    No artificial nutrition       Plan for disposition home tomorrow    Time: 30 minutes    Signature: Electronically signed by Faustino Kendrick MD, 05/25/25, 12:25 EDT.  [facility] Hospitalist Team

## 2025-05-25 NOTE — PLAN OF CARE
Goal Outcome Evaluation:              Outcome Evaluation: able to make needs known, call light in reach. mirilax given today. scheduled abx given

## 2025-05-25 NOTE — PROGRESS NOTES
Hematology/Oncology Inpatient Progress Note    PATIENT NAME: Angella Santos  : 1960  MRN: [id number]    CHIEF COMPLAINT: Headache    HISTORY OF PRESENT ILLNESS:      2025: I was asked to see Ms. Santos who came to the hospital complaining of an intense headache. Headaches are relatively unusual for and never as long-lasting as this time. She had imaging of the brain that revealed no obvious abnormalities. In addition she had laboratory exams. A blood count reported leukopenia with mild neutropenia and lymphopenia. Hemoglobin and mean corpuscular volume were well within the normal range and she also had thrombocytopenia. I am asked to investigate. She is feeling better this morning although she persists with headache. In general, however, she has felt well. She reports being active and maintaining a stable weight. Her appetite is adequate and she has been afebrile and without nocturnal diaphoresis. She has had no respiratory symptoms, chest pains or abdominal pain. She has been taking regular bowel activity and has had no dysuria or hematuria. She has not started to take any medications in the recent past. She takes multiple supplements. She has no significant past medical history. On exam she is a slender well-built woman who is conversant and well-oriented. She does not seem acutely ill. She is neither jaundiced nor pale. No oral lesions. Respirations not labored and lungs clear bilaterally. Heart regular. Abdomen soft and without hepatomegaly or splenomegaly. No edema. Laboratory exams reviewed. Discussed with her. The changes appear to be rather acute and without an obvious explanation at this time. I have requested laboratory exams and she may need to have a bone marrow biopsy and aspiration. Discussed at length with her.     Subjective   2025: Feels progressively better though she's still weak. She has been able to eat more and she's no longer nauseated. She has not had any fevers.      ROS:  Review of Systems   Constitutional:  Negative for activity change, appetite change, chills, diaphoresis, fatigue, fever and unexpected weight change.   HENT:  Negative for congestion, dental problem, drooling, ear discharge, ear pain, facial swelling, hearing loss, mouth sores, nosebleeds, postnasal drip, rhinorrhea, sinus pressure, sinus pain, sneezing, sore throat, tinnitus, trouble swallowing and voice change.    Eyes:  Negative for photophobia, pain, discharge, redness, itching and visual disturbance.   Respiratory:  Negative for apnea, cough, choking, chest tightness, shortness of breath, wheezing and stridor.    Cardiovascular:  Negative for chest pain, palpitations and leg swelling.   Gastrointestinal:  Negative for abdominal distention, abdominal pain, anal bleeding, blood in stool, constipation, diarrhea, nausea, rectal pain and vomiting.   Endocrine: Negative for cold intolerance, heat intolerance, polydipsia and polyuria.   Genitourinary:  Negative for decreased urine volume, difficulty urinating, dysuria, flank pain, frequency, genital sores, hematuria and urgency.   Musculoskeletal:  Negative for arthralgias, back pain, gait problem, joint swelling, myalgias, neck pain and neck stiffness.   Skin:  Negative for color change, pallor and rash.   Neurological:  Positive for headaches. Negative for dizziness, tremors, seizures, syncope, facial asymmetry, speech difficulty, weakness, light-headedness and numbness.   Hematological:  Negative for adenopathy. Does not bruise/bleed easily.   Psychiatric/Behavioral:  Negative for agitation, behavioral problems, confusion, decreased concentration, hallucinations, self-injury, sleep disturbance and suicidal ideas. The patient is not nervous/anxious.       MEDICATIONS:    Scheduled Meds:  doxycycline, 100 mg, Oral, Q12H  levETIRAcetam, 500 mg, Oral, Daily  meropenem, 1,000 mg, Intravenous, Q8H  pantoprazole, 40 mg, Oral, QAM AC       Continuous Infusions:    "     PRN Meds:    acetaminophen    baclofen    butalbital-acetaminophen-caffeine    Calcium Replacement - Follow Nurse / BPA Driven Protocol    gabapentin    ibuprofen    Magnesium Standard Dose Replacement - Follow Nurse / BPA Driven Protocol    melatonin    ondansetron ODT **OR** ondansetron    Phosphorus Replacement - Follow Nurse / BPA Driven Protocol    polyethylene glycol    Potassium Replacement - Follow Nurse / BPA Driven Protocol    senna    [COMPLETED] Insert Peripheral IV **AND** sodium chloride     ALLERGIES:    Allergies   Allergen Reactions    Cephalexin Hives     Objective    VITALS:   /75 (BP Location: Right arm, Patient Position: Lying)   Pulse 60   Temp 98.7 °F (37.1 °C) (Oral)   Resp 18   Ht 165.1 cm (65\")   Wt 56.6 kg (124 lb 12.5 oz)   SpO2 97%   BMI 20.76 kg/m²     PHYSICAL EXAM: (performed by MD)  Physical Exam  Constitutional:       General: She is not in acute distress.     Appearance: She is ill-appearing. She is not toxic-appearing or diaphoretic.   HENT:      Head: Normocephalic and atraumatic.      Right Ear: External ear normal.      Left Ear: External ear normal.      Nose: Nose normal.      Mouth/Throat:      Mouth: Mucous membranes are moist.      Pharynx: Oropharynx is clear. No oropharyngeal exudate or posterior oropharyngeal erythema.   Eyes:      General: No scleral icterus.        Right eye: No discharge.         Left eye: No discharge.      Conjunctiva/sclera: Conjunctivae normal.      Pupils: Pupils are equal, round, and reactive to light.   Cardiovascular:      Rate and Rhythm: Normal rate and regular rhythm.      Pulses: Normal pulses.      Heart sounds: No murmur heard.     No friction rub. No gallop.   Pulmonary:      Effort: No respiratory distress.      Breath sounds: No stridor. No wheezing, rhonchi or rales.   Abdominal:      General: Abdomen is flat. Bowel sounds are normal. There is no distension.      Palpations: Abdomen is soft. There is no mass.      " Tenderness: There is no abdominal tenderness. There is no right CVA tenderness, left CVA tenderness, guarding or rebound.      Hernia: No hernia is present.   Musculoskeletal:         General: No tenderness, deformity or signs of injury.      Cervical back: No rigidity.      Right lower leg: No edema.      Left lower leg: No edema.   Lymphadenopathy:      Cervical: No cervical adenopathy.   Skin:     Coloration: Skin is not jaundiced or pale.      Findings: No bruising, lesion or rash.   Neurological:      General: No focal deficit present.      Mental Status: She is alert and oriented to person, place, and time.      Cranial Nerves: No cranial nerve deficit.   Psychiatric:         Mood and Affect: Mood normal.         Behavior: Behavior normal.         Thought Content: Thought content normal.         Judgment: Judgment normal.     SERA George MD performed the physical exam on 5/25/2025 as documented above.     RECENT LABS:  Lab Results (last 24 hours)       Procedure Component Value Units Date/Time    CBC & Differential [[id number]]  (Abnormal) Collected: 05/25/25 0541    Specimen: Blood Updated: 05/25/25 0837    Narrative:      The following orders were created for panel order CBC & Differential.  Procedure                               Abnormality         Status                     ---------                               -----------         ------                     CBC Auto Differential[[id number]]        Abnormal            Final result               Scan Slide[[id number]]                                       Final result                 Please view results for these tests on the individual orders.    CBC Auto Differential [[id number]]  (Abnormal) Collected: 05/25/25 0541    Specimen: Blood Updated: 05/25/25 0837     WBC 3.38 10*3/mm3      RBC 3.70 10*6/mm3      Hemoglobin 11.1 g/dL      Hematocrit 34.1 %      MCV 92.2 fL      MCH 30.0 pg      MCHC 32.6 g/dL      RDW 13.2 %      RDW-SD 44.8 fl      MPV 11.6  fL      Platelets 74 10*3/mm3     Narrative:      The previously reported component NRBC is no longer being reported. Previous result was 0.0 /100 WBC (Reference Range: 0.0-0.2 /100 WBC) on 5/25/2025 at 0637 EDT.    Scan Slide [[id number]] Collected: 05/25/25 0541    Specimen: Blood Updated: 05/25/25 0837     Scan Slide --     Comment: See Manual Differential Results       Manual Differential [[id number]]  (Abnormal) Collected: 05/25/25 0541    Specimen: Blood Updated: 05/25/25 0837     Neutrophil % 23.0 %      Lymphocyte % 53.0 %      Monocyte % 11.0 %      Eosinophil % 1.0 %      Basophil % 2.0 %      Bands %  2.0 %      Atypical Lymphocyte % 7.0 %      Plasma Cells % 1.0 %      Neutrophils Absolute 0.85 10*3/mm3      Lymphocytes Absolute 2.03 10*3/mm3      Monocytes Absolute 0.37 10*3/mm3      Eosinophils Absolute 0.03 10*3/mm3      Basophils Absolute 0.07 10*3/mm3      RBC Morphology Normal     Platelet Estimate Decreased     Large Platelets Slight/1+    Comprehensive Metabolic Panel [[id number]]  (Abnormal) Collected: 05/25/25 0541    Specimen: Blood Updated: 05/25/25 0622     Glucose 103 mg/dL      BUN 13 mg/dL      Creatinine 0.79 mg/dL      Sodium 141 mmol/L      Potassium 4.6 mmol/L      Chloride 107 mmol/L      CO2 25.3 mmol/L      Calcium 8.5 mg/dL      Total Protein 5.5 g/dL      Albumin 3.4 g/dL      ALT (SGPT) 407 U/L      AST (SGOT) 353 U/L      Alkaline Phosphatase 547 U/L      Total Bilirubin 0.4 mg/dL      Globulin 2.1 gm/dL      A/G Ratio 1.6 g/dL      BUN/Creatinine Ratio 16.5     Anion Gap 8.7 mmol/L      eGFR 83.1 mL/min/1.73     Narrative:      GFR Categories in Chronic Kidney Disease (CKD)              GFR Category          GFR (mL/min/1.73)    Interpretation  G1                    90 or greater        Normal or high (1)  G2                    60-89                Mild decrease (1)  G3a                   45-59                Mild to moderate decrease  G3b                   30-44                 Moderate to severe decrease  G4                    15-29                Severe decrease  G5                    14 or less           Kidney failure    (1)In the absence of evidence of kidney disease, neither GFR category G1 or G2 fulfill the criteria for CKD.    eGFR calculation 2021 CKD-EPI creatinine equation, which does not include race as a factor    Blood Culture - Blood, Arm, Left [[id number]]  (Normal) Collected: 05/21/25 1706    Specimen: Blood from Arm, Left Updated: 05/24/25 1831     Blood Culture No growth at 3 days    Blood Culture - Blood, Arm, Left [[id number]]  (Normal) Collected: 05/21/25 1805    Specimen: Blood from Arm, Left Updated: 05/24/25 1831     Blood Culture No growth at 3 days          IMAGING REVIEWED:  No radiology results for the last day      Assessment & Plan   ASSESSMENT:  Leukopenia and thrombocytopenia: Ehrlichiosis?  The laboratory exams reveal progressive improvement of  the blood counts. Persists with neutropenia but now moderate. Awaiting additional laboratory exams.   Headaches: Seem resolved.     PLAN:  As above.     Josef George MD on 5/25/2025 at 1112 am.

## 2025-05-25 NOTE — PROGRESS NOTES
Infectious Diseases Progress Note      LOS: 6 days   Patient Care Team:  Nikhil Maloney MD as PCP - General  Sherry Negrete, BARBARA as Ambulatory  (Oncology) ([facility])    Chief Complaint: Headache, weakness    Subjective       Patient had a high-grade fever yesterday afternoon but has been afebrile since.  She is hemodynamically stable and tolerating antimicrobial therapy.  Symptoms and headache have improved      Review of Systems:   Review of Systems   Eyes: Negative.    Respiratory: Negative.     Cardiovascular: Negative.    Gastrointestinal: Negative.    Endocrine: Negative.    Genitourinary: Negative.    Musculoskeletal: Negative.    Skin: Negative.    Neurological:  Positive for weakness and headaches.   Psychiatric/Behavioral: Negative.     All other systems reviewed and are negative.       Objective     Vital Signs  Temp:  [98.5 °F (36.9 °C)-98.9 °F (37.2 °C)] 98.5 °F (36.9 °C)  Heart Rate:  [60-69] 65  Resp:  [16-18] 18  BP: ()/(57-75) 106/67    Physical Exam:  Physical Exam  Vitals and nursing note reviewed.   Constitutional:       General: She is not in acute distress.     Appearance: She is well-developed and normal weight. She is ill-appearing. She is not diaphoretic.   HENT:      Head: Normocephalic and atraumatic.   Eyes:      General: No scleral icterus.     Extraocular Movements: Extraocular movements intact.      Conjunctiva/sclera: Conjunctivae normal.      Pupils: Pupils are equal, round, and reactive to light.   Cardiovascular:      Rate and Rhythm: Normal rate and regular rhythm.      Heart sounds: Normal heart sounds, S1 normal and S2 normal. No murmur heard.  Pulmonary:      Effort: Pulmonary effort is normal. No respiratory distress.      Breath sounds: Normal breath sounds. No stridor. No wheezing or rales.   Chest:      Chest wall: No tenderness.   Abdominal:      General: Bowel sounds are normal. There is no distension.      Palpations: Abdomen is soft. There is  no mass.      Tenderness: There is no abdominal tenderness. There is no guarding.   Musculoskeletal:         General: No swelling, tenderness or deformity. Normal range of motion.      Cervical back: Neck supple.   Skin:     General: Skin is warm and dry.      Coloration: Skin is not pale.      Findings: No bruising, erythema or rash.   Neurological:      General: No focal deficit present.      Mental Status: She is alert and oriented to person, place, and time. Mental status is at baseline.      Cranial Nerves: No cranial nerve deficit.   Psychiatric:         Mood and Affect: Mood normal.          Results Review:    I have reviewed all clinical data, test, lab, and imaging results.     Radiology  No Radiology Exams Resulted Within Past 24 Hours    Cardiology    Laboratory    Results from last 7 days   Lab Units 05/25/25  0541 05/24/25  0216 05/23/25  0226 05/22/25  0050 05/21/25 0551 05/19/25 2353 05/19/25 2016   WBC 10*3/mm3 3.38* 2.30* 1.43* 0.80* 1.04* 1.53* 1.64*   HEMOGLOBIN g/dL 11.1* 11.5* 11.0* 11.7* 13.1 13.6 13.6   HEMATOCRIT % 34.1 34.9 33.4* 36.2 40.3 40.8 41.6   PLATELETS 10*3/mm3 74* 50* 33* 37* 53* 84* 97*     Results from last 7 days   Lab Units 05/25/25  0541 05/24/25  0216 05/23/25  1313 05/23/25  0226 05/22/25 0050 05/21/25 0551 05/19/25 2353 05/19/25 2016   SODIUM mmol/L 141 136  --  134* 131* 137 137 132*   POTASSIUM mmol/L 4.6 4.2 4.6 3.4* 3.8 5.0 4.7 4.4   CHLORIDE mmol/L 107 105  --  105 99 103 104 99   CO2 mmol/L 25.3 23.3  --  21.5* 24.1 24.1 23.9 23.8   BUN mg/dL 13 9  --  8 12 10 13 16   CREATININE mg/dL 0.79 0.93  --  0.92 0.94 1.01* 0.90 0.99   GLUCOSE mg/dL 103* 97  --  100* 123* 99 134* 164*   ALBUMIN g/dL 3.4* 3.4*  --  3.2* 3.6 3.9 4.0 4.3   BILIRUBIN mg/dL 0.4 0.6  --  0.8 1.0 0.8 0.5 0.5   ALK PHOS U/L 547* 550*  --  519* 508* 409* 181* 171*   AST (SGOT) U/L 353* 422*  --  614* 425* 177* 166* 140*   ALT (SGPT) U/L 407* 465*  --  536* 361* 175* 140* 132*   CALCIUM mg/dL  8.5* 8.1*  --  7.4* 8.1* 9.0 9.0 9.2                 Microbiology   Microbiology Results (last 10 days)       Procedure Component Value - Date/Time    MRSA Screen, PCR (Inpatient) - Swab, Nares [[id number]]  (Normal) Collected: 05/22/25 0817    Lab Status: Final result Specimen: Swab from Nares Updated: 05/22/25 1003     MRSA PCR No MRSA Detected    Narrative:      The negative predictive value of this diagnostic test is high and should only be used to consider de-escalating anti-MRSA therapy. A positive result may indicate colonization with MRSA and must be correlated clinically.    Blood Culture - Blood, Arm, Left [[id number]]  (Normal) Collected: 05/21/25 1805    Lab Status: Preliminary result Specimen: Blood from Arm, Left Updated: 05/24/25 1831     Blood Culture No growth at 3 days    Respiratory Panel PCR w/COVID-19(SARS-CoV-2) JL/IVONNE/ZAYNAB/PAD/COR/STACIA In-House, NP Swab in UTM/VTM, 2 HR TAT - Swab, Nasopharynx [[id number]]  (Normal) Collected: 05/21/25 1709    Lab Status: Final result Specimen: Swab from Nasopharynx Updated: 05/21/25 1922     ADENOVIRUS, PCR Not Detected     Coronavirus 229E Not Detected     Coronavirus HKU1 Not Detected     Coronavirus NL63 Not Detected     Coronavirus OC43 Not Detected     COVID19 Not Detected     Human Metapneumovirus Not Detected     Human Rhinovirus/Enterovirus Not Detected     Influenza A PCR Not Detected     Influenza B PCR Not Detected     Parainfluenza Virus 1 Not Detected     Parainfluenza Virus 2 Not Detected     Parainfluenza Virus 3 Not Detected     Parainfluenza Virus 4 Not Detected     RSV, PCR Not Detected     Bordetella pertussis pcr Not Detected     Bordetella parapertussis PCR Not Detected     Chlamydophila pneumoniae PCR Not Detected     Mycoplasma pneumo by PCR Not Detected    Narrative:      In the setting of a positive respiratory panel with a viral infection PLUS a negative procalcitonin without other underlying concern for bacterial infection, consider  observing off antibiotics or discontinuation of antibiotics and continue supportive care. If the respiratory panel is positive for atypical bacterial infection (Bordetella pertussis, Chlamydophila pneumoniae, or Mycoplasma pneumoniae), consider antibiotic de-escalation to target atypical bacterial infection.    Blood Culture - Blood, Arm, Left [[id number]]  (Normal) Collected: 05/21/25 1706    Lab Status: Preliminary result Specimen: Blood from Arm, Left Updated: 05/24/25 1831     Blood Culture No growth at 3 days            Medication Review:       Schedule Meds  doxycycline, 100 mg, Oral, Q12H  levETIRAcetam, 500 mg, Oral, Daily  pantoprazole, 40 mg, Oral, QAM AC        Infusion Meds       PRN Meds    acetaminophen    baclofen    butalbital-acetaminophen-caffeine    Calcium Replacement - Follow Nurse / BPA Driven Protocol    gabapentin    ibuprofen    Magnesium Standard Dose Replacement - Follow Nurse / BPA Driven Protocol    melatonin    ondansetron ODT **OR** ondansetron    Phosphorus Replacement - Follow Nurse / BPA Driven Protocol    polyethylene glycol    Potassium Replacement - Follow Nurse / BPA Driven Protocol    senna    [COMPLETED] Insert Peripheral IV **AND** sodium chloride        Assessment & Plan       Antimicrobial Therapy   1.  P.o. doxycycline        2.        3.        4.        5.            Assessment     Febrile illness associated with pancytopenia and elevated transaminase with tick exposure.  Presentation is highly concerning for tickborne infection such as ehrlichiosis.  Other possibility will be viral infection such as CMV or Frank-Barr virus infection.  Malignancy is in the differential diagnosis but less likely.  Cytomegalovirus IgM and PCR were negative.  Frank-Morris IgM  and PCR were negative     Severe neutropenia.  The patient is not known to be on immunosuppressive therapy.  The patient is being followed by hematology service and status post bone marrow biopsy     Plan      Continue p.o. doxycycline 100 mg twice daily     Discontinue IV meropenem      Serum for Ehrlichia PCR-pending  Waiting on bone marrow biopsy results  Continue supportive care  A.m. labs  Case discussed with patient and family members at the bedside  Case discussed with neurology      Shayla Gonsalves, ARABELLA  05/25/25  15:11 EDT    Note is dictated utilizing voice recognition software/Dragon

## 2025-05-25 NOTE — PLAN OF CARE
Problem: Adult Inpatient Plan of Care  Goal: Plan of Care Review  Outcome: Progressing  Flowsheets (Taken 5/25/2025 0352)  Progress: improving  Outcome Evaluation:   Patient is alert and independent   able to communicate her needs. Antibiotics infusing at this time   patient sleeping and has no complaints. Call light within reach and VS stable.  Plan of Care Reviewed With:   patient   spouse  Goal: Patient-Specific Goal (Individualized)  Outcome: Progressing  Goal: Absence of Hospital-Acquired Illness or Injury  Outcome: Progressing  Intervention: Identify and Manage Fall Risk  Recent Flowsheet Documentation  Taken 5/25/2025 0200 by Eileen Tan RN  Safety Promotion/Fall Prevention:   safety round/check completed   room organization consistent   clutter free environment maintained   assistive device/personal items within reach   nonskid shoes/slippers when out of bed   fall prevention program maintained  Taken 5/25/2025 0000 by Eileen Tan RN  Safety Promotion/Fall Prevention:   safety round/check completed   room organization consistent   clutter free environment maintained   assistive device/personal items within reach   nonskid shoes/slippers when out of bed   fall prevention program maintained  Taken 5/24/2025 2200 by Eileen Tan, RN  Safety Promotion/Fall Prevention:   safety round/check completed   room organization consistent   clutter free environment maintained   assistive device/personal items within reach   nonskid shoes/slippers when out of bed   fall prevention program maintained   lighting adjusted  Taken 5/24/2025 2048 by Eileen Tan, RN  Safety Promotion/Fall Prevention:   safety round/check completed   room organization consistent   clutter free environment maintained   assistive device/personal items within reach   nonskid shoes/slippers when out of bed   fall prevention program maintained   lighting adjusted  Intervention: Prevent Skin Injury  Recent Flowsheet Documentation  Taken  5/24/2025 2100 by Eileen Tan RN  Body Position: position changed independently  Taken 5/24/2025 2048 by Eileen Tan RN  Body Position: position changed independently  Intervention: Prevent Infection  Recent Flowsheet Documentation  Taken 5/25/2025 0200 by Eileen Tan RN  Infection Prevention:   single patient room provided   rest/sleep promoted  Taken 5/25/2025 0000 by Eileen Tan RN  Infection Prevention:   single patient room provided   rest/sleep promoted  Taken 5/24/2025 2200 by Eileen Tan RN  Infection Prevention:   single patient room provided   rest/sleep promoted  Taken 5/24/2025 2048 by Eileen Tan RN  Infection Prevention:   single patient room provided   rest/sleep promoted  Goal: Optimal Comfort and Wellbeing  Outcome: Progressing  Intervention: Provide Person-Centered Care  Recent Flowsheet Documentation  Taken 5/24/2025 2048 by Eileen Tan RN  Trust Relationship/Rapport:   questions encouraged   questions answered   choices provided   thoughts/feelings acknowledged  Goal: Readiness for Transition of Care  Outcome: Progressing   Goal Outcome Evaluation:  Plan of Care Reviewed With: patient, spouse        Progress: improving  Outcome Evaluation: Patient is alert and independent; able to communicate her needs. Antibiotics infusing at this time; patient sleeping and has no complaints. Call light within reach and VS stable.

## 2025-05-26 ENCOUNTER — READMISSION MANAGEMENT (OUTPATIENT)
Dept: CALL CENTER | Facility: HOSPITAL | Age: 65
End: 2025-05-26
Payer: COMMERCIAL

## 2025-05-26 VITALS
BODY MASS INDEX: 20.79 KG/M2 | TEMPERATURE: 97.9 F | SYSTOLIC BLOOD PRESSURE: 113 MMHG | HEIGHT: 65 IN | RESPIRATION RATE: 16 BRPM | DIASTOLIC BLOOD PRESSURE: 69 MMHG | HEART RATE: 63 BPM | OXYGEN SATURATION: 97 % | WEIGHT: 124.78 LBS

## 2025-05-26 LAB
A PHAGOCYTOPH DNA BLD QL NAA+PROBE: NEGATIVE
ALBUMIN SERPL-MCNC: 3.6 G/DL (ref 3.5–5.2)
ALBUMIN/GLOB SERPL: 1.7 G/DL
ALP SERPL-CCNC: 516 U/L (ref 39–117)
ALT SERPL W P-5'-P-CCNC: 359 U/L (ref 1–33)
ANION GAP SERPL CALCULATED.3IONS-SCNC: 6.2 MMOL/L (ref 5–15)
AST SERPL-CCNC: 212 U/L (ref 1–32)
BACTERIA SPEC AEROBE CULT: NORMAL
BACTERIA SPEC AEROBE CULT: NORMAL
BILIRUB SERPL-MCNC: 0.4 MG/DL (ref 0–1.2)
BUN SERPL-MCNC: 15 MG/DL (ref 8–23)
BUN/CREAT SERPL: 20.3 (ref 7–25)
CALCIUM SPEC-SCNC: 8.6 MG/DL (ref 8.6–10.5)
CHLORIDE SERPL-SCNC: 106 MMOL/L (ref 98–107)
CO2 SERPL-SCNC: 26.8 MMOL/L (ref 22–29)
CREAT SERPL-MCNC: 0.74 MG/DL (ref 0.57–1)
DEPRECATED RDW RBC AUTO: 45.9 FL (ref 37–54)
EGFRCR SERPLBLD CKD-EPI 2021: 89.9 ML/MIN/1.73
EHRLICHIA DNA SPEC QL NAA+PROBE: POSITIVE
EOSINOPHIL # BLD MANUAL: 0.05 10*3/MM3 (ref 0–0.4)
EOSINOPHIL NFR BLD MANUAL: 1 % (ref 0.3–6.2)
ERYTHROCYTE [DISTWIDTH] IN BLOOD BY AUTOMATED COUNT: 13.3 % (ref 12.3–15.4)
GLOBULIN UR ELPH-MCNC: 2.1 GM/DL
GLUCOSE SERPL-MCNC: 99 MG/DL (ref 65–99)
HCT VFR BLD AUTO: 36.4 % (ref 34–46.6)
HGB BLD-MCNC: 11.8 G/DL (ref 12–15.9)
LARGE PLATELETS: ABNORMAL
LYMPHOCYTES # BLD MANUAL: 3.08 10*3/MM3 (ref 0.7–3.1)
LYMPHOCYTES NFR BLD MANUAL: 8 % (ref 5–12)
MCH RBC QN AUTO: 30.2 PG (ref 26.6–33)
MCHC RBC AUTO-ENTMCNC: 32.4 G/DL (ref 31.5–35.7)
MCV RBC AUTO: 93.1 FL (ref 79–97)
METAMYELOCYTES NFR BLD MANUAL: 2 % (ref 0–0)
MONOCYTES # BLD: 0.4 10*3/MM3 (ref 0.1–0.9)
MYELOCYTES NFR BLD MANUAL: 1 % (ref 0–0)
NEUTROPHILS # BLD AUTO: 1.24 10*3/MM3 (ref 1.7–7)
NEUTROPHILS NFR BLD MANUAL: 18 % (ref 42.7–76)
NEUTS BAND NFR BLD MANUAL: 7 % (ref 0–5)
PLASMA CELL PREC NFR BLD MANUAL: 1 % (ref 0–0)
PLATELET # BLD AUTO: 112 10*3/MM3 (ref 140–450)
PMV BLD AUTO: 11.3 FL (ref 6–12)
POTASSIUM SERPL-SCNC: 4.4 MMOL/L (ref 3.5–5.2)
PROT SERPL-MCNC: 5.7 G/DL (ref 6–8.5)
RBC # BLD AUTO: 3.91 10*6/MM3 (ref 3.77–5.28)
RBC MORPH BLD: NORMAL
SCAN SLIDE: NORMAL
SMALL PLATELETS BLD QL SMEAR: ABNORMAL
SODIUM SERPL-SCNC: 139 MMOL/L (ref 136–145)
VARIANT LYMPHS NFR BLD MANUAL: 54 % (ref 19.6–45.3)
VARIANT LYMPHS NFR BLD MANUAL: 8 % (ref 0–5)
WBC NRBC COR # BLD AUTO: 4.96 10*3/MM3 (ref 3.4–10.8)

## 2025-05-26 PROCEDURE — 80053 COMPREHEN METABOLIC PANEL: CPT

## 2025-05-26 PROCEDURE — 99232 SBSQ HOSP IP/OBS MODERATE 35: CPT | Performed by: INTERNAL MEDICINE

## 2025-05-26 PROCEDURE — 85007 BL SMEAR W/DIFF WBC COUNT: CPT | Performed by: INTERNAL MEDICINE

## 2025-05-26 PROCEDURE — 85025 COMPLETE CBC W/AUTO DIFF WBC: CPT | Performed by: INTERNAL MEDICINE

## 2025-05-26 RX ORDER — LEVETIRACETAM 500 MG/1
500 TABLET ORAL DAILY
Qty: 10 TABLET | Refills: 0 | Status: SHIPPED | OUTPATIENT
Start: 2025-05-27 | End: 2025-06-06

## 2025-05-26 RX ORDER — LEVETIRACETAM 500 MG/1
500 TABLET ORAL DAILY
Qty: 10 TABLET | Refills: 0 | Status: SHIPPED | OUTPATIENT
Start: 2025-05-27 | End: 2025-05-26

## 2025-05-26 RX ORDER — DOXYCYCLINE 100 MG/1
100 CAPSULE ORAL EVERY 12 HOURS SCHEDULED
Qty: 12 CAPSULE | Refills: 0 | Status: SHIPPED | OUTPATIENT
Start: 2025-05-26 | End: 2025-05-26

## 2025-05-26 RX ORDER — DOXYCYCLINE 100 MG/1
100 CAPSULE ORAL EVERY 12 HOURS SCHEDULED
Qty: 12 CAPSULE | Refills: 0 | Status: SHIPPED | OUTPATIENT
Start: 2025-05-26 | End: 2025-06-01

## 2025-05-26 RX ADMIN — DOXYCYCLINE 100 MG: 100 CAPSULE ORAL at 08:23

## 2025-05-26 RX ADMIN — PANTOPRAZOLE SODIUM 40 MG: 40 TABLET, DELAYED RELEASE ORAL at 08:23

## 2025-05-26 RX ADMIN — LEVETIRACETAM 500 MG: 500 TABLET, FILM COATED ORAL at 08:23

## 2025-05-26 RX ADMIN — IBUPROFEN 400 MG: 400 TABLET, FILM COATED ORAL at 13:40

## 2025-05-26 NOTE — OUTREACH NOTE
Prep Survey      Flowsheet Row Responses   [facility] patient discharged from? [facility]   Is LACE score < 7 ? No   Eligibility [facility]   Date of Admission 05/19/25   Date of Discharge 05/26/25   Discharge Disposition Home or Self Care   Discharge diagnosis Leukopenia, tick exposure   Does the patient have one of the following disease processes/diagnoses(primary or secondary)? Other   Does the patient have Home health ordered? No   Is there a DME ordered? No   Prep survey completed? Yes            Makayla DANIELS - Registered Nurse

## 2025-05-26 NOTE — PLAN OF CARE
Goal Outcome Evaluation:              Outcome Evaluation: Patient pleasant and coopeative. PRN medication given for headache. Effective. Currently resting.

## 2025-05-26 NOTE — PROGRESS NOTES
Infectious Diseases Progress Note      LOS: 7 days   Patient Care Team:  Nikhil Maloney MD as PCP - General  Sherry Negrete, BARBARA as Ambulatory  (Oncology) ([facility])    Chief Complaint: Headache, weakness    Subjective       Patient had a high-grade fever yesterday afternoon but has been afebrile since.  She is hemodynamically stable and tolerating antimicrobial therapy.  Symptoms and headache have improved      Review of Systems:   Review of Systems   Eyes: Negative.    Respiratory: Negative.     Cardiovascular: Negative.    Gastrointestinal: Negative.    Endocrine: Negative.    Genitourinary: Negative.    Musculoskeletal: Negative.    Skin: Negative.    Neurological:  Positive for weakness and headaches.   Psychiatric/Behavioral: Negative.     All other systems reviewed and are negative.       Objective     Vital Signs  Temp:  [97.8 °F (36.6 °C)-98.5 °F (36.9 °C)] 97.9 °F (36.6 °C)  Heart Rate:  [60-65] 63  Resp:  [9-18] 16  BP: (106-117)/(67-75) 113/69    Physical Exam:  Physical Exam  Vitals and nursing note reviewed.   Constitutional:       General: She is not in acute distress.     Appearance: She is well-developed and normal weight. She is ill-appearing. She is not diaphoretic.   HENT:      Head: Normocephalic and atraumatic.   Eyes:      General: No scleral icterus.     Extraocular Movements: Extraocular movements intact.      Conjunctiva/sclera: Conjunctivae normal.      Pupils: Pupils are equal, round, and reactive to light.   Cardiovascular:      Rate and Rhythm: Normal rate and regular rhythm.      Heart sounds: Normal heart sounds, S1 normal and S2 normal. No murmur heard.  Pulmonary:      Effort: Pulmonary effort is normal. No respiratory distress.      Breath sounds: Normal breath sounds. No stridor. No wheezing or rales.   Chest:      Chest wall: No tenderness.   Abdominal:      General: Bowel sounds are normal. There is no distension.      Palpations: Abdomen is soft. There is  no mass.      Tenderness: There is no abdominal tenderness. There is no guarding.   Musculoskeletal:         General: No swelling, tenderness or deformity. Normal range of motion.      Cervical back: Neck supple.   Skin:     General: Skin is warm and dry.      Coloration: Skin is not pale.      Findings: No bruising, erythema or rash.   Neurological:      General: No focal deficit present.      Mental Status: She is alert and oriented to person, place, and time. Mental status is at baseline.      Cranial Nerves: No cranial nerve deficit.   Psychiatric:         Mood and Affect: Mood normal.          Results Review:    I have reviewed all clinical data, test, lab, and imaging results.     Radiology  No Radiology Exams Resulted Within Past 24 Hours    Cardiology    Laboratory    Results from last 7 days   Lab Units 05/26/25  0535 05/25/25  0541 05/24/25  0216 05/23/25  0226 05/22/25  0050 05/21/25  0551 05/19/25  2353   WBC 10*3/mm3 4.96 3.38* 2.30* 1.43* 0.80* 1.04* 1.53*   HEMOGLOBIN g/dL 11.8* 11.1* 11.5* 11.0* 11.7* 13.1 13.6   HEMATOCRIT % 36.4 34.1 34.9 33.4* 36.2 40.3 40.8   PLATELETS 10*3/mm3 112* 74* 50* 33* 37* 53* 84*     Results from last 7 days   Lab Units 05/26/25  0535 05/25/25  0541 05/24/25  0216 05/23/25  1313 05/23/25  0226 05/22/25  0050 05/21/25  0551 05/19/25  2353   SODIUM mmol/L 139 141 136  --  134* 131* 137 137   POTASSIUM mmol/L 4.4 4.6 4.2 4.6 3.4* 3.8 5.0 4.7   CHLORIDE mmol/L 106 107 105  --  105 99 103 104   CO2 mmol/L 26.8 25.3 23.3  --  21.5* 24.1 24.1 23.9   BUN mg/dL 15 13 9  --  8 12 10 13   CREATININE mg/dL 0.74 0.79 0.93  --  0.92 0.94 1.01* 0.90   GLUCOSE mg/dL 99 103* 97  --  100* 123* 99 134*   ALBUMIN g/dL 3.6 3.4* 3.4*  --  3.2* 3.6 3.9 4.0   BILIRUBIN mg/dL 0.4 0.4 0.6  --  0.8 1.0 0.8 0.5   ALK PHOS U/L 516* 547* 550*  --  519* 508* 409* 181*   AST (SGOT) U/L 212* 353* 422*  --  614* 425* 177* 166*   ALT (SGPT) U/L 359* 407* 465*  --  536* 361* 175* 140*   CALCIUM mg/dL 8.6  8.5* 8.1*  --  7.4* 8.1* 9.0 9.0                 Microbiology   Microbiology Results (last 10 days)       Procedure Component Value - Date/Time    MRSA Screen, PCR (Inpatient) - Swab, Nares [[id number]]  (Normal) Collected: 05/22/25 0817    Lab Status: Final result Specimen: Swab from Nares Updated: 05/22/25 1003     MRSA PCR No MRSA Detected    Narrative:      The negative predictive value of this diagnostic test is high and should only be used to consider de-escalating anti-MRSA therapy. A positive result may indicate colonization with MRSA and must be correlated clinically.    Blood Culture - Blood, Arm, Left [[id number]]  (Normal) Collected: 05/21/25 1805    Lab Status: Preliminary result Specimen: Blood from Arm, Left Updated: 05/25/25 1831     Blood Culture No growth at 4 days    Respiratory Panel PCR w/COVID-19(SARS-CoV-2) JL/IVONNE/ZAYNAB/PAD/COR/STACIA In-House, NP Swab in UTM/VTM, 2 HR TAT - Swab, Nasopharynx [[id number]]  (Normal) Collected: 05/21/25 1709    Lab Status: Final result Specimen: Swab from Nasopharynx Updated: 05/21/25 1922     ADENOVIRUS, PCR Not Detected     Coronavirus 229E Not Detected     Coronavirus HKU1 Not Detected     Coronavirus NL63 Not Detected     Coronavirus OC43 Not Detected     COVID19 Not Detected     Human Metapneumovirus Not Detected     Human Rhinovirus/Enterovirus Not Detected     Influenza A PCR Not Detected     Influenza B PCR Not Detected     Parainfluenza Virus 1 Not Detected     Parainfluenza Virus 2 Not Detected     Parainfluenza Virus 3 Not Detected     Parainfluenza Virus 4 Not Detected     RSV, PCR Not Detected     Bordetella pertussis pcr Not Detected     Bordetella parapertussis PCR Not Detected     Chlamydophila pneumoniae PCR Not Detected     Mycoplasma pneumo by PCR Not Detected    Narrative:      In the setting of a positive respiratory panel with a viral infection PLUS a negative procalcitonin without other underlying concern for bacterial infection, consider  observing off antibiotics or discontinuation of antibiotics and continue supportive care. If the respiratory panel is positive for atypical bacterial infection (Bordetella pertussis, Chlamydophila pneumoniae, or Mycoplasma pneumoniae), consider antibiotic de-escalation to target atypical bacterial infection.    Blood Culture - Blood, Arm, Left [[id number]]  (Normal) Collected: 05/21/25 1706    Lab Status: Preliminary result Specimen: Blood from Arm, Left Updated: 05/25/25 1831     Blood Culture No growth at 4 days            Medication Review:       Schedule Meds  doxycycline, 100 mg, Oral, Q12H  levETIRAcetam, 500 mg, Oral, Daily  pantoprazole, 40 mg, Oral, QAM AC        Infusion Meds       PRN Meds    acetaminophen    baclofen    butalbital-acetaminophen-caffeine    Calcium Replacement - Follow Nurse / BPA Driven Protocol    gabapentin    ibuprofen    Magnesium Standard Dose Replacement - Follow Nurse / BPA Driven Protocol    melatonin    ondansetron ODT **OR** ondansetron    Phosphorus Replacement - Follow Nurse / BPA Driven Protocol    polyethylene glycol    Potassium Replacement - Follow Nurse / BPA Driven Protocol    senna    [COMPLETED] Insert Peripheral IV **AND** sodium chloride        Assessment & Plan       Antimicrobial Therapy   1.  P.o. doxycycline        2.        3.        4.        5.            Assessment    Human monocytic ehrlichiosis.  Patient presented with pancytopenia with neutropenia and thrombocytopenia and elevated transaminase after 12 days of tick exposure.  Labs had improved significantly patient is not neutropenic anymore.  Patient had fever on admission and she has been afebrile for few days.  Serum was positive for Ehrlichia PCR     Severe neutropenia.  Resolved.  Secondary to above     Plan     Continue p.o. doxycycline 100 mg twice daily for 10 days    Case discussed with patient and family at the bedside    Okay to discharge from Infectious Disease standpoint  Not much more to add  from infectious disease standpoint-we will sign off at this time-please call with any questions.      Shayla Gonsalves, APRN  05/26/25  11:49 EDT    Note is dictated utilizing voice recognition software/Dragon

## 2025-05-26 NOTE — DISCHARGE SUMMARY
".             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         Hospital Course     HPI:    \" Angella Santos is a 65 y.o. female with a CMH of GERD, osteoporosis, h/o kidney stones who presented to [facility] on 2025 with headache that began yesterday. Reports it began behind her right ear, and has slowly progressed to the left temporal region.  Describes it as a sharp stabbing pain, that comes and goes.  States that she took Tylenol yesterday, and it helped temporarily.  Reports she has had chills, weakness, decreased appetite, nausea since yesterday.  Denies fevers, chest pain, shortness of breath, abdominal pain, vomiting. \"    Hospital Course:  Patient admitted and evaluated by infectious disease, treated for Febrile illness associated with pancytopenia and elevated transaminase with tick exposure.  She will continue p.o. doxycycline for total of 10 days.  Infectious disease signed off.  Hematology/oncology also consulted due to presentation and feel this is likely secondary to tick exposure and have also signed off.  Patient resting this morning, has been walking the hallway, tolerating her diet and toileting without any difficulty.  She is happy to go home.        DISCHARGE Follow Up Recommendations for labs and diagnostics: PCP        Day of Discharge     Vital " Signs:  Temp:  [97.8 °F (36.6 °C)-98.5 °F (36.9 °C)] 97.9 °F (36.6 °C)  Heart Rate:  [60-65] 63  Resp:  [9-18] 16  BP: (106-117)/(67-75) 113/69    Physical Exam:  Physical Exam   General Appearance:  Alert, cooperative, no distress, appears stated age  Head:   Normocephalic  Neck:   Supple  Lungs: respirations unlabored  Heart:  Regular rate and rhythm  Abdomen:  Soft, nontender  Pulses2+ and symmetric  Skin: no rashes or lesions  Neurologic: Normal      Pertinent  and/or Most Recent Results     LAB RESULTS:      Lab 05/26/25  0535 05/25/25  0541 05/24/25  0216 05/23/25  0226 05/22/25  0429 05/22/25  0050 05/21/25  1805 05/21/25  0551 05/20/25  0323 05/19/25  2353 05/19/25 2016   WBC 4.96 3.38* 2.30* 1.43*  --  0.80*  --  1.04*  --    < > 1.64*   HEMOGLOBIN 11.8* 11.1* 11.5* 11.0*  --  11.7*  --  13.1  --    < > 13.6   HEMATOCRIT 36.4 34.1 34.9 33.4*  --  36.2  --  40.3  --    < > 41.6   PLATELETS 112* 74* 50* 33*  --  37*  --  53*  --    < > 97*   NEUTROS ABS 1.24* 0.85* 0.69* 0.79*  --   --   --  0.68*  --    < > 1.34*   IMMATURE GRANS (ABS)  --   --   --   --   --   --   --   --   --   --  0.01   LYMPHS ABS  --   --   --   --   --   --   --   --   --   --  0.18*   MONOS ABS  --   --   --   --   --   --   --   --   --   --  0.10   EOS ABS 0.05 0.03  --   --   --   --   --   --   --   --  0.00   MCV 93.1 92.2 91.8 93.3  --  93.5  --  93.5  --    < > 93.9   PROCALCITONIN  --   --   --   --   --   --  0.19  --   --   --   --    LACTATE  --   --   --   --  1.5  --   --   --   --   --   --    PROTIME  --   --   --   --   --   --   --   --  14.1  --   --    APTT  --   --   --   --   --   --   --   --  29.2  --   --     < > = values in this interval not displayed.         Lab 05/26/25  0535 05/25/25  0541 05/24/25  0216 05/23/25  1313 05/23/25  0226 05/22/25  0050 05/19/25  2353 05/19/25 2016   SODIUM 139 141 136  --  134* 131*   < > 132*   POTASSIUM 4.4 4.6 4.2 4.6 3.4* 3.8   < > 4.4   CHLORIDE 106 107 105  --  105 99    < > 99   CO2 26.8 25.3 23.3  --  21.5* 24.1   < > 23.8   ANION GAP 6.2 8.7 7.7  --  7.5 7.9   < > 9.2   BUN 15 13 9  --  8 12   < > 16   CREATININE 0.74 0.79 0.93  --  0.92 0.94   < > 0.99   EGFR 89.9 83.1 68.3  --  69.2 67.5   < > 63.4   GLUCOSE 99 103* 97  --  100* 123*   < > 164*   CALCIUM 8.6 8.5* 8.1*  --  7.4* 8.1*   < > 9.2   MAGNESIUM  --   --   --   --   --   --   --  2.0   PHOSPHORUS  --   --   --   --   --   --   --  2.5    < > = values in this interval not displayed.         Lab 05/26/25  0535 05/25/25  0541 05/24/25  0216 05/23/25  0226 05/22/25  0050   TOTAL PROTEIN 5.7* 5.5* 5.3* 4.9* 5.6*   ALBUMIN 3.6 3.4* 3.4* 3.2* 3.6   GLOBULIN 2.1 2.1 1.9 1.7 2.0   ALT (SGPT) 359* 407* 465* 536* 361*   AST (SGOT) 212* 353* 422* 614* 425*   BILIRUBIN 0.4 0.4 0.6 0.8 1.0   ALK PHOS 516* 547* 550* 519* 508*         Lab 05/20/25  0323   PROTIME 14.1   INR 1.09             Lab 05/22/25  0050 05/20/25  0323   IRON  --  25*   IRON SATURATION (TSAT)  --  7*   TIBC  --  368   TRANSFERRIN  --  247   FERRITIN  --  385.00*   FOLATE  --  >20.00   VITAMIN B 12  --  1,443*   ABO TYPING O  --    RH TYPING Positive  --    ANTIBODY SCREEN Negative  --          Brief Urine Lab Results  (Last result in the past 365 days)        Color   Clarity   Blood   Leuk Est   Nitrite   Protein   CREAT   Urine HCG        05/21/25 1940 Dark Yellow   Clear   Negative   Trace   Negative   30 mg/dL (1+)                 Microbiology Results (last 10 days)       Procedure Component Value - Date/Time    MRSA Screen, PCR (Inpatient) - Swab, Nares [[id number]]  (Normal) Collected: 05/22/25 0817    Lab Status: Final result Specimen: Swab from Nares Updated: 05/22/25 1003     MRSA PCR No MRSA Detected    Narrative:      The negative predictive value of this diagnostic test is high and should only be used to consider de-escalating anti-MRSA therapy. A positive result may indicate colonization with MRSA and must be correlated clinically.    Blood Culture -  Blood, Arm, Left [[id number]]  (Normal) Collected: 05/21/25 1805    Lab Status: Preliminary result Specimen: Blood from Arm, Left Updated: 05/25/25 1831     Blood Culture No growth at 4 days    Respiratory Panel PCR w/COVID-19(SARS-CoV-2) JL/IVONNE/ZAYNAB/PAD/COR/STACIA In-House, NP Swab in UTM/VTM, 2 HR TAT - Swab, Nasopharynx [[id number]]  (Normal) Collected: 05/21/25 1709    Lab Status: Final result Specimen: Swab from Nasopharynx Updated: 05/21/25 1922     ADENOVIRUS, PCR Not Detected     Coronavirus 229E Not Detected     Coronavirus HKU1 Not Detected     Coronavirus NL63 Not Detected     Coronavirus OC43 Not Detected     COVID19 Not Detected     Human Metapneumovirus Not Detected     Human Rhinovirus/Enterovirus Not Detected     Influenza A PCR Not Detected     Influenza B PCR Not Detected     Parainfluenza Virus 1 Not Detected     Parainfluenza Virus 2 Not Detected     Parainfluenza Virus 3 Not Detected     Parainfluenza Virus 4 Not Detected     RSV, PCR Not Detected     Bordetella pertussis pcr Not Detected     Bordetella parapertussis PCR Not Detected     Chlamydophila pneumoniae PCR Not Detected     Mycoplasma pneumo by PCR Not Detected    Narrative:      In the setting of a positive respiratory panel with a viral infection PLUS a negative procalcitonin without other underlying concern for bacterial infection, consider observing off antibiotics or discontinuation of antibiotics and continue supportive care. If the respiratory panel is positive for atypical bacterial infection (Bordetella pertussis, Chlamydophila pneumoniae, or Mycoplasma pneumoniae), consider antibiotic de-escalation to target atypical bacterial infection.    Blood Culture - Blood, Arm, Left [[id number]]  (Normal) Collected: 05/21/25 1706    Lab Status: Preliminary result Specimen: Blood from Arm, Left Updated: 05/25/25 1831     Blood Culture No growth at 4 days            CT Abdomen Pelvis Without Contrast  Result Date: 5/22/2025  Impression: 1.No  acute findings within the chest, abdomen or pelvis. 2.Small nonobstructing bilateral renal stones. Electronically Signed: Alejandra Snyder MD  5/22/2025 8:23 AM EDT  Workstation ID: [id number]    CT Chest Without Contrast Diagnostic  Result Date: 5/22/2025  Impression: 1.No acute findings within the chest, abdomen or pelvis. 2.Small nonobstructing bilateral renal stones. Electronically Signed: Alejandra Snyder MD  5/22/2025 8:23 AM EDT  Workstation ID: [id number]    XR Chest 1 View  Result Date: 5/21/2025  Impression: No active disease. Electronically Signed: Crispin Maxwell MD  5/21/2025 6:03 PM EDT  Workstation ID: [id number]    CT Bone marrow biopsy and aspiration  Result Date: 5/21/2025  Impression: Impression: Technically successful CT-guided left posterior iliac 11-gauge bone marrow aspiration and core biopsy, as described. Electronically Signed: Jp Torres MD  5/21/2025 2:46 PM EDT  Workstation ID: [id number]    MRI Brain With & Without Contrast  Result Date: 5/20/2025  Impression: Impression: 1. No acute intracranial abnormalities identified. 2. 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 Electronically Signed: Crispin Maxwell MD  5/20/2025 4:41 PM EDT  Workstation ID: [id number]    US Abdomen Limited  Result Date: 5/20/2025  Impression: Impression: 1. Mildly increased hepatic echogenicity may relate to underlying hepatic steatosis. 2. Spleen size normal with calculated splenic volume of 358 mL. 3. No biliary dilatation. Electronically Signed: Thomas Chin MD  5/20/2025 7:52 AM EDT  Workstation ID: [id number]    CT Head Without Contrast  Result Date: 5/19/2025  Impression: Impression: No acute intracranial abnormality. Electronically Signed: Pieter Palm DO  5/19/2025 9:11 PM EDT  Workstation ID: [id number]      Results for orders placed during the hospital encounter of 02/08/22    Vascular screening (bundle) CAR    Interpretation Summary  · Normal screening vascular ultrasound study      Results for  orders placed during the hospital encounter of 02/08/22    Vascular screening (bundle) CAR    Interpretation Summary  · Normal screening vascular ultrasound study          Labs Pending at Discharge:  Pending Results       Procedure [Order ID] Specimen - Date/Time    Cytogenetics (Integrated Oncology) [[id number]] Collected: 05/21/25 1121    Specimen: Bone Marrow from Iliac Crest, Left - Biopsy Updated: 05/21/25 1158            Procedures Performed           Consults:   Consults       Date and Time Order Name Status Description    5/21/2025  4:43 PM Inpatient Infectious Diseases Consult Completed     5/20/2025  7:29 AM Inpatient Neurology Consult General Completed     5/19/2025  9:19 PM Hematology and Oncology (on-call MD unless specified) Completed               Discharge Details        Discharge Medications        New Medications        Instructions Start Date   doxycycline 100 MG capsule  Commonly known as: MONODOX   100 mg, Oral, Every 12 Hours Scheduled      levETIRAcetam 500 MG tablet  Commonly known as: KEPPRA   500 mg, Oral, Daily   Start Date: May 27, 2025            Changes to Medications        Instructions Start Date   clobetasol propionate 0.05 % cream  Commonly known as: TEMOVATE  What changed:   how much to take  when to take this  reasons to take this   Apply to external vulva 1-2 times weekly as needed.      QC TUMERIC COMPLEX PO  What changed: how much to take   1 tablet, Daily             Continue These Medications        Instructions Start Date   acetaminophen 500 MG tablet  Commonly known as: TYLENOL   1,000 mg, Every 6 Hours PRN      B-12 1000 MCG tablet   1 tablet, Daily      Calcium Citrate-Vitamin D3 315-6.25 MG-MCG tablet tablet  Commonly known as: CITRACAL   2 tablets, Daily      estradiol 0.1 MG/GM vaginal cream  Commonly known as: ESTRACE   Use fingertip amount to vagina nightly for 2 weeks then 1-2 times weekly.      HYDROcodone-acetaminophen 5-325 MG per tablet  Commonly known as:  NORCO   1 tablet, Oral, Every 4 Hours PRN      Magnesium 250 MG tablet   2 tablets, Nightly      multivitamin with minerals tablet tablet   1 tablet, Daily      pantoprazole 20 MG EC tablet  Commonly known as: Protonix   20 mg, Oral, Daily      Reclast 5 MG/100ML solution infusion  Generic drug: zoledronic acid   5 mg, Once, YEARLY OSTEOPROSIS               Allergies   Allergen Reactions   • Cephalexin Hives         Discharge Disposition: Home  Home or Self Care    Diet:  Hospital:  Diet Order   Procedures   • Diet: Regular/House; Fluid Consistency: Thin (IDDSI 0)         Discharge Activity:         CODE STATUS:  Code Status and Medical Interventions: CPR (Attempt to Resuscitate); Limited Support; No artificial nutrition   Ordered at: 05/20/25 0116     Code Status (Patient has no pulse and is not breathing):    CPR (Attempt to Resuscitate)     Medical Interventions (Patient has pulse or is breathing):    Limited Support     Medical Intervention Limits:    No artificial nutrition         No future appointments.        Time spent on Discharge including face to face service: 35 minutes    Signature: Electronically signed by Faustino Kendrick MD, 05/26/25, 11:51 EDT.  [facility] Hospitalist Team

## 2025-05-26 NOTE — PROGRESS NOTES
Hematology/Oncology Inpatient Progress Note    PATIENT NAME: Angella Santos  : 1960  MRN: [id number]    CHIEF COMPLAINT: Headache    HISTORY OF PRESENT ILLNESS:      2025: I was asked to see Ms. Santos who came to the hospital complaining of an intense headache. Headaches are relatively unusual for and never as long-lasting as this time. She had imaging of the brain that revealed no obvious abnormalities. In addition she had laboratory exams. A blood count reported leukopenia with mild neutropenia and lymphopenia. Hemoglobin and mean corpuscular volume were well within the normal range and she also had thrombocytopenia. I am asked to investigate. She is feeling better this morning although she persists with headache. In general, however, she has felt well. She reports being active and maintaining a stable weight. Her appetite is adequate and she has been afebrile and without nocturnal diaphoresis. She has had no respiratory symptoms, chest pains or abdominal pain. She has been taking regular bowel activity and has had no dysuria or hematuria. She has not started to take any medications in the recent past. She takes multiple supplements. She has no significant past medical history. On exam she is a slender well-built woman who is conversant and well-oriented. She does not seem acutely ill. She is neither jaundiced nor pale. No oral lesions. Respirations not labored and lungs clear bilaterally. Heart regular. Abdomen soft and without hepatomegaly or splenomegaly. No edema. Laboratory exams reviewed. Discussed with her. The changes appear to be rather acute and without an obvious explanation at this time. I have requested laboratory exams and she may need to have a bone marrow biopsy and aspiration. Discussed at length with her.     Subjective   2025: Feeling much better. Still had a headache but with a different pattern. No fevers.     ROS:  Review of Systems   Constitutional:  Negative for  activity change, appetite change, chills, diaphoresis, fatigue, fever and unexpected weight change.   HENT:  Negative for congestion, dental problem, drooling, ear discharge, ear pain, facial swelling, hearing loss, mouth sores, nosebleeds, postnasal drip, rhinorrhea, sinus pressure, sinus pain, sneezing, sore throat, tinnitus, trouble swallowing and voice change.    Eyes:  Negative for photophobia, pain, discharge, redness, itching and visual disturbance.   Respiratory:  Negative for apnea, cough, choking, chest tightness, shortness of breath, wheezing and stridor.    Cardiovascular:  Negative for chest pain, palpitations and leg swelling.   Gastrointestinal:  Negative for abdominal distention, abdominal pain, anal bleeding, blood in stool, constipation, diarrhea, nausea, rectal pain and vomiting.   Endocrine: Negative for cold intolerance, heat intolerance, polydipsia and polyuria.   Genitourinary:  Negative for decreased urine volume, difficulty urinating, dysuria, flank pain, frequency, genital sores, hematuria and urgency.   Musculoskeletal:  Negative for arthralgias, back pain, gait problem, joint swelling, myalgias, neck pain and neck stiffness.   Skin:  Negative for color change, pallor and rash.   Neurological:  Positive for headaches. Negative for dizziness, tremors, seizures, syncope, facial asymmetry, speech difficulty, weakness, light-headedness and numbness.   Hematological:  Negative for adenopathy. Does not bruise/bleed easily.   Psychiatric/Behavioral:  Negative for agitation, behavioral problems, confusion, decreased concentration, hallucinations, self-injury, sleep disturbance and suicidal ideas. The patient is not nervous/anxious.       MEDICATIONS:    Scheduled Meds:  doxycycline, 100 mg, Oral, Q12H  levETIRAcetam, 500 mg, Oral, Daily  pantoprazole, 40 mg, Oral, QAM AC       Continuous Infusions:        PRN Meds:    acetaminophen    baclofen    butalbital-acetaminophen-caffeine    Calcium  "Replacement - Follow Nurse / BPA Driven Protocol    gabapentin    ibuprofen    Magnesium Standard Dose Replacement - Follow Nurse / BPA Driven Protocol    melatonin    ondansetron ODT **OR** ondansetron    Phosphorus Replacement - Follow Nurse / BPA Driven Protocol    polyethylene glycol    Potassium Replacement - Follow Nurse / BPA Driven Protocol    senna    [COMPLETED] Insert Peripheral IV **AND** sodium chloride     ALLERGIES:    Allergies   Allergen Reactions    Cephalexin Hives     Objective    VITALS:   /69 (BP Location: Left arm, Patient Position: Lying)   Pulse 63   Temp 97.9 °F (36.6 °C) (Oral)   Resp 16   Ht 165.1 cm (65\")   Wt 56.6 kg (124 lb 12.5 oz)   SpO2 97%   BMI 20.76 kg/m²     PHYSICAL EXAM: (performed by MD)  Physical Exam  Constitutional:       General: She is not in acute distress.     Appearance: She is ill-appearing. She is not toxic-appearing or diaphoretic.   HENT:      Head: Normocephalic and atraumatic.      Right Ear: External ear normal.      Left Ear: External ear normal.      Nose: Nose normal.      Mouth/Throat:      Mouth: Mucous membranes are moist.      Pharynx: Oropharynx is clear. No oropharyngeal exudate or posterior oropharyngeal erythema.   Eyes:      General: No scleral icterus.        Right eye: No discharge.         Left eye: No discharge.      Conjunctiva/sclera: Conjunctivae normal.      Pupils: Pupils are equal, round, and reactive to light.   Cardiovascular:      Rate and Rhythm: Normal rate and regular rhythm.      Pulses: Normal pulses.      Heart sounds: No murmur heard.     No friction rub. No gallop.   Pulmonary:      Effort: No respiratory distress.      Breath sounds: No stridor. No wheezing, rhonchi or rales.   Abdominal:      General: Abdomen is flat. Bowel sounds are normal. There is no distension.      Palpations: Abdomen is soft. There is no mass.      Tenderness: There is no abdominal tenderness. There is no right CVA tenderness, left CVA " tenderness, guarding or rebound.      Hernia: No hernia is present.   Musculoskeletal:         General: No tenderness, deformity or signs of injury.      Cervical back: No rigidity.      Right lower leg: No edema.      Left lower leg: No edema.   Lymphadenopathy:      Cervical: No cervical adenopathy.   Skin:     Coloration: Skin is not jaundiced or pale.      Findings: No bruising, lesion or rash.   Neurological:      General: No focal deficit present.      Mental Status: She is alert and oriented to person, place, and time.      Cranial Nerves: No cranial nerve deficit.   Psychiatric:         Mood and Affect: Mood normal.         Behavior: Behavior normal.         Thought Content: Thought content normal.         Judgment: Judgment normal.     SERA George MD performed the physical exam on 5/26/2025 as documented above.     RECENT LABS:  Lab Results (last 24 hours)       Procedure Component Value Units Date/Time    CBC & Differential [[id number]]  (Abnormal) Collected: 05/26/25 0535    Specimen: Blood Updated: 05/26/25 0642    Narrative:      The following orders were created for panel order CBC & Differential.  Procedure                               Abnormality         Status                     ---------                               -----------         ------                     CBC Auto Differential[[id number]]        Abnormal            Final result               Scan Slide[[id number]]                                       Final result                 Please view results for these tests on the individual orders.    CBC Auto Differential [[id number]]  (Abnormal) Collected: 05/26/25 0535    Specimen: Blood Updated: 05/26/25 0642     WBC 4.96 10*3/mm3      RBC 3.91 10*6/mm3      Hemoglobin 11.8 g/dL      Hematocrit 36.4 %      MCV 93.1 fL      MCH 30.2 pg      MCHC 32.4 g/dL      RDW 13.3 %      RDW-SD 45.9 fl      MPV 11.3 fL      Platelets 112 10*3/mm3      Comment: Result checked         Scan Slide  [[id number]] Collected: 05/26/25 0535    Specimen: Blood Updated: 05/26/25 0642     Scan Slide --     Comment: See Manual Differential Results       Manual Differential [[id number]]  (Abnormal) Collected: 05/26/25 0535    Specimen: Blood Updated: 05/26/25 0642     Neutrophil % 18.0 %      Lymphocyte % 54.0 %      Monocyte % 8.0 %      Eosinophil % 1.0 %      Bands %  7.0 %      Metamyelocyte % 2.0 %      Myelocyte % 1.0 %      Atypical Lymphocyte % 8.0 %      Plasma Cells % 1.0 %      Neutrophils Absolute 1.24 10*3/mm3      Lymphocytes Absolute 3.08 10*3/mm3      Monocytes Absolute 0.40 10*3/mm3      Eosinophils Absolute 0.05 10*3/mm3      RBC Morphology Normal     Platelet Estimate Decreased     Large Platelets Slight/1+    Comprehensive Metabolic Panel [[id number]]  (Abnormal) Collected: 05/26/25 0535    Specimen: Blood Updated: 05/26/25 0612     Glucose 99 mg/dL      BUN 15 mg/dL      Creatinine 0.74 mg/dL      Sodium 139 mmol/L      Potassium 4.4 mmol/L      Chloride 106 mmol/L      CO2 26.8 mmol/L      Calcium 8.6 mg/dL      Total Protein 5.7 g/dL      Albumin 3.6 g/dL      ALT (SGPT) 359 U/L      AST (SGOT) 212 U/L      Alkaline Phosphatase 516 U/L      Total Bilirubin 0.4 mg/dL      Globulin 2.1 gm/dL      A/G Ratio 1.7 g/dL      BUN/Creatinine Ratio 20.3     Anion Gap 6.2 mmol/L      eGFR 89.9 mL/min/1.73     Narrative:      GFR Categories in Chronic Kidney Disease (CKD)              GFR Category          GFR (mL/min/1.73)    Interpretation  G1                    90 or greater        Normal or high (1)  G2                    60-89                Mild decrease (1)  G3a                   45-59                Mild to moderate decrease  G3b                   30-44                Moderate to severe decrease  G4                    15-29                Severe decrease  G5                    14 or less           Kidney failure    (1)In the absence of evidence of kidney disease, neither GFR category G1 or G2 fulfill the  criteria for CKD.    eGFR calculation 2021 CKD-EPI creatinine equation, which does not include race as a factor    Blood Culture - Blood, Arm, Left [[id number]]  (Normal) Collected: 05/21/25 1706    Specimen: Blood from Arm, Left Updated: 05/25/25 1831     Blood Culture No growth at 4 days    Blood Culture - Blood, Arm, Left [[id number]]  (Normal) Collected: 05/21/25 1805    Specimen: Blood from Arm, Left Updated: 05/25/25 1831     Blood Culture No growth at 4 days          IMAGING REVIEWED:  No radiology results for the last day      Assessment & Plan   ASSESSMENT:  Leukopenia and thrombocytopenia: The Ehrlichia DNA profile is reported positive for A phagocytophilum. This confirms the diagnosis and the course, following the commencement of doxycycline conforms the impression despite my initial reservations. The clinical picture was somewhat unusual. She no longer has critical neutropenia or thrombocytopenia. She could be discharged home from my perspective.   Headaches: Improved.     PLAN:  As above.      Josef George MD on 5/26/2025 at 10:26 AM.

## 2025-05-26 NOTE — PLAN OF CARE
Goal Outcome Evaluation:                 Patient has discharge orders. Discharge instructions provided. IV removed.

## 2025-05-27 ENCOUNTER — TRANSITIONAL CARE MANAGEMENT TELEPHONE ENCOUNTER (OUTPATIENT)
Dept: CALL CENTER | Facility: HOSPITAL | Age: 65
End: 2025-05-27
Payer: COMMERCIAL

## 2025-05-27 NOTE — OUTREACH NOTE
Call Center TCM Note      Flowsheet Row Responses   [facility] patient discharged from? [facility]   Does the patient have one of the following disease processes/diagnoses(primary or secondary)? Other   TCM attempt successful? No   Unsuccessful attempts Attempt 1   Call Status Left message            Martina Santamaria Licensed Nurse    5/27/2025, 10:08 EDT

## 2025-05-27 NOTE — OUTREACH NOTE
Call Center TCM Note      Flowsheet Row Responses   [facility] [place] patient discharged from? [facility]   Does the patient have one of the following disease processes/diagnoses(primary or secondary)? Other   TCM attempt successful? Yes   Call start time 1545   Call end time 1549   Discharge diagnosis Leukopenia, tick exposure   Meds reviewed with patient/caregiver? Yes   Is the patient having any side effects they believe may be caused by any medication additions or changes? No   Does the patient have all medications ordered at discharge? Yes   Is the patient taking all medications as directed (includes completed medication regime)? Yes   Medication comments States she was told by Dr. Carlton to stop keppra today.  States she looked over his notes and states that it what it says too.   Comments Appt made for 6/9 @ 2:15 with Dr. Maloney   Does the patient have an appointment with their PCP within 7-14 days of discharge? No   Nursing Interventions Assisted patient with making appointment per protocol   Psychosocial issues? No   Did the patient receive a copy of their discharge instructions? Yes   Nursing interventions Reviewed instructions with patient   What is the patient's perception of their health status since discharge? Improving   Is the patient/caregiver able to teach back signs and symptoms related to disease process for when to call PCP? Yes   Is the patient/caregiver able to teach back signs and symptoms related to disease process for when to call 911? Yes   Is the patient/caregiver able to teach back the hierarchy of who to call/visit for symptoms/problems? PCP, Specialist, Home health nurse, Urgent Care, ED, 911 Yes   If the patient is a current smoker, are they able to teach back resources for cessation? Not a smoker   Additional teach back comments She is improving.   TCM call completed? Yes   Wrap up additional comments Appt made with PCP with no other needs at this time.   Call end time 1549             Martina BRAMBILA - Licensed Nurse    5/27/2025, 15:51 EDT

## 2025-05-27 NOTE — CASE MANAGEMENT/SOCIAL WORK
Case Management Discharge Note      Final Note: Routine home         Selected Continued Care - Discharged on 5/26/2025 Admission date: 5/19/2025 - Discharge disposition: Home or Self Care         Transportation Services  Private: Car    Final Discharge Disposition Code: 01 - home or self-care

## 2025-05-28 LAB — MDS TARGETGENE PANEL RESULT: NORMAL

## 2025-05-29 LAB
BLOOD OR BONE MARROW RESULT: NORMAL
CYTOGENETICS RESULT: NORMAL

## 2025-05-30 LAB
LAB AP CASE REPORT: NORMAL
LAB AP DIAGNOSIS COMMENT: NORMAL
LAB AP FLOW CYTOMETRY SUMMARY: NORMAL
PATH REPORT.FINAL DX SPEC: NORMAL
PATH REPORT.GROSS SPEC: NORMAL

## 2025-06-03 ENCOUNTER — READMISSION MANAGEMENT (OUTPATIENT)
Dept: CALL CENTER | Facility: HOSPITAL | Age: 65
End: 2025-06-03
Payer: COMMERCIAL

## 2025-06-03 ENCOUNTER — TELEPHONE (OUTPATIENT)
Dept: ONCOLOGY | Facility: CLINIC | Age: 65
End: 2025-06-03
Payer: COMMERCIAL

## 2025-06-03 NOTE — OUTREACH NOTE
Medical Week 2 Survey      Flowsheet Row Responses   [facility] patient discharged from? [facility]   Does the patient have one of the following disease processes/diagnoses(primary or secondary)? Other   Week 2 attempt successful? No   Unsuccessful attempts Attempt 1   Revoke Carolyn TATUM - Registered Nurse

## 2025-06-09 ENCOUNTER — OFFICE VISIT (OUTPATIENT)
Dept: FAMILY MEDICINE CLINIC | Facility: CLINIC | Age: 65
End: 2025-06-09
Payer: MEDICARE

## 2025-06-09 ENCOUNTER — LAB (OUTPATIENT)
Dept: FAMILY MEDICINE CLINIC | Facility: CLINIC | Age: 65
End: 2025-06-09
Payer: MEDICARE

## 2025-06-09 VITALS
OXYGEN SATURATION: 98 % | HEIGHT: 65 IN | SYSTOLIC BLOOD PRESSURE: 124 MMHG | BODY MASS INDEX: 23.99 KG/M2 | DIASTOLIC BLOOD PRESSURE: 84 MMHG | RESPIRATION RATE: 16 BRPM | TEMPERATURE: 96.3 F | WEIGHT: 144 LBS | HEART RATE: 80 BPM

## 2025-06-09 DIAGNOSIS — R79.89 ELEVATED LFTS: ICD-10-CM

## 2025-06-09 DIAGNOSIS — D72.819 LEUKOPENIA, UNSPECIFIED TYPE: Primary | ICD-10-CM

## 2025-06-09 DIAGNOSIS — A77.40 EHRLICHIOSIS: ICD-10-CM

## 2025-06-09 DIAGNOSIS — Z09 HOSPITAL DISCHARGE FOLLOW-UP: ICD-10-CM

## 2025-06-09 LAB
ALBUMIN SERPL-MCNC: 4.2 G/DL (ref 3.5–5.2)
ALBUMIN/GLOB SERPL: 1.5 G/DL
ALP SERPL-CCNC: 171 U/L (ref 39–117)
ALT SERPL W P-5'-P-CCNC: 22 U/L (ref 1–33)
ANION GAP SERPL CALCULATED.3IONS-SCNC: 10.7 MMOL/L (ref 5–15)
AST SERPL-CCNC: 23 U/L (ref 1–32)
BASOPHILS # BLD AUTO: 0.05 10*3/MM3 (ref 0–0.2)
BASOPHILS NFR BLD AUTO: 0.8 % (ref 0–1.5)
BILIRUB SERPL-MCNC: 0.3 MG/DL (ref 0–1.2)
BUN SERPL-MCNC: 21 MG/DL (ref 8–23)
BUN/CREAT SERPL: 22.8 (ref 7–25)
CALCIUM SPEC-SCNC: 9.8 MG/DL (ref 8.6–10.5)
CHLORIDE SERPL-SCNC: 107 MMOL/L (ref 98–107)
CO2 SERPL-SCNC: 24.3 MMOL/L (ref 22–29)
CREAT SERPL-MCNC: 0.92 MG/DL (ref 0.57–1)
DEPRECATED RDW RBC AUTO: 44.6 FL (ref 37–54)
EGFRCR SERPLBLD CKD-EPI 2021: 69.2 ML/MIN/1.73
EOSINOPHIL # BLD AUTO: 0.04 10*3/MM3 (ref 0–0.4)
EOSINOPHIL NFR BLD AUTO: 0.6 % (ref 0.3–6.2)
ERYTHROCYTE [DISTWIDTH] IN BLOOD BY AUTOMATED COUNT: 12.5 % (ref 12.3–15.4)
GLOBULIN UR ELPH-MCNC: 2.8 GM/DL
GLUCOSE SERPL-MCNC: 113 MG/DL (ref 65–99)
HCT VFR BLD AUTO: 36.2 % (ref 34–46.6)
HGB BLD-MCNC: 11.5 G/DL (ref 12–15.9)
IMM GRANULOCYTES # BLD AUTO: 0.03 10*3/MM3 (ref 0–0.05)
IMM GRANULOCYTES NFR BLD AUTO: 0.5 % (ref 0–0.5)
LYMPHOCYTES # BLD AUTO: 1.37 10*3/MM3 (ref 0.7–3.1)
LYMPHOCYTES NFR BLD AUTO: 21.9 % (ref 19.6–45.3)
MCH RBC QN AUTO: 31 PG (ref 26.6–33)
MCHC RBC AUTO-ENTMCNC: 31.8 G/DL (ref 31.5–35.7)
MCV RBC AUTO: 97.6 FL (ref 79–97)
MONOCYTES # BLD AUTO: 0.71 10*3/MM3 (ref 0.1–0.9)
MONOCYTES NFR BLD AUTO: 11.4 % (ref 5–12)
NEUTROPHILS NFR BLD AUTO: 4.05 10*3/MM3 (ref 1.7–7)
NEUTROPHILS NFR BLD AUTO: 64.8 % (ref 42.7–76)
NRBC BLD AUTO-RTO: 0 /100 WBC (ref 0–0.2)
PLATELET # BLD AUTO: 228 10*3/MM3 (ref 140–450)
PMV BLD AUTO: 11.7 FL (ref 6–12)
POTASSIUM SERPL-SCNC: 4.1 MMOL/L (ref 3.5–5.2)
PROT SERPL-MCNC: 7 G/DL (ref 6–8.5)
RBC # BLD AUTO: 3.71 10*6/MM3 (ref 3.77–5.28)
SODIUM SERPL-SCNC: 142 MMOL/L (ref 136–145)
WBC NRBC COR # BLD AUTO: 6.25 10*3/MM3 (ref 3.4–10.8)

## 2025-06-09 PROCEDURE — 36415 COLL VENOUS BLD VENIPUNCTURE: CPT | Performed by: FAMILY MEDICINE

## 2025-06-09 PROCEDURE — 85025 COMPLETE CBC W/AUTO DIFF WBC: CPT | Performed by: FAMILY MEDICINE

## 2025-06-09 PROCEDURE — 80053 COMPREHEN METABOLIC PANEL: CPT | Performed by: FAMILY MEDICINE

## 2025-06-09 NOTE — PROGRESS NOTES
Subjective   Angella Santos is a 65 y.o. female.     History of Present Illness     History of Present Illness  The patient is a 65-year-old female who presents for a post-hospital follow-up. She was admitted to [facility] on 05/19/2025 with complaints of fever, body aches and headache. Prior to her admission, she experienced a tick bite behind her right knee, which she removed herself on Mother's Day. Approximately a week later, she developed a severe, sharp, stabbing headache, described as an electric shock, accompanied by malaise. Despite applying an ice pack and taking Tylenol and Advil, the headache persisted throughout the day. On the evening of 05/19/2025, she sought medical attention at the ER, where she was admitted for further workup.  CBC showed her her white blood cell count was found to be low at 1.4, and a platelet count of 84 , and elevated LFTs, which prompting further investigation.  She was evaluated by oncology and infectious diseases consultant.  She underwent bone marrow biopsy, CT abdomen pelvis and chest, CT head, brain MRI all came back unremarkable.  Negative respiratory panel negative blood culture. She tested positive for Ehrlichia DNA PCR.  She was subsequently started on doxycycline on 05/21/2025, completing a 10-day course.    Her headache has since resolved, and she reports no current body aches or fevers. She has resumed playing pickleball but notes a decrease in stamina. She also experienced weakness during her illness but reports feeling better now. Her appetite has returned to normal, and she did not experience any weight loss. She reports no abdominal pain, nausea, or vomiting.     The following portions of the patient's history were reviewed and updated as appropriate: past medical history, past social history, past surgical history and problem list.    Review of Systems   Constitutional:  Positive for fatigue. Negative for activity change, appetite change and  fever.   Respiratory:  Negative for shortness of breath.    Cardiovascular:  Negative for chest pain and palpitations.   Gastrointestinal:  Negative for abdominal pain, constipation, diarrhea, nausea and vomiting.   Neurological:  Negative for dizziness, syncope, headache and memory problem.   Psychiatric/Behavioral:  The patient is not nervous/anxious.        Results  Labs   - White Blood Cell Count: 1.4   - ALT: Highest: 536, then went down to 259   - AST: Highest: 614, then went down to 212    Objective   Physical Exam  Vitals reviewed.   Constitutional:       Appearance: She is well-developed.   Neck:      Thyroid: No thyromegaly.   Cardiovascular:      Heart sounds: Normal heart sounds.   Pulmonary:      Effort: Pulmonary effort is normal.      Breath sounds: Normal breath sounds. No wheezing.   Abdominal:      Palpations: Abdomen is soft.      Tenderness: There is no abdominal tenderness.   Neurological:      General: No focal deficit present.      Mental Status: She is alert and oriented to person, place, and time.   Psychiatric:         Mood and Affect: Mood normal.         Vitals:    06/09/25 1413   BP: 124/84   Pulse: 80   Resp: 16   Temp: 96.3 °F (35.7 °C)   SpO2: 98%     Current Outpatient Medications on File Prior to Visit   Medication Sig Dispense Refill    Calcium Citrate-Vitamin D3 (CITRACAL) 315-6.25 MG-MCG tablet tablet Take 2 tablets by mouth Daily. Patient stated she is taking calcium with vit d not just calcium      clobetasol propionate (TEMOVATE) 0.05 % cream Apply to external vulva 1-2 times weekly as needed. (Patient taking differently: Apply 1 Application topically to the appropriate area as directed As Needed.) 15 g 2    Cyanocobalamin (B-12) 1000 MCG tablet Take 1 tablet by mouth Daily.      estradiol (ESTRACE) 0.1 MG/GM vaginal cream Use fingertip amount to vagina nightly for 2 weeks then 1-2 times weekly. 42.5 g 2    Magnesium 250 MG tablet Take 2 tablets by mouth Every Night.       multivitamin with minerals tablet tablet Take 1 tablet by mouth Daily.      pantoprazole (Protonix) 20 MG EC tablet Take 1 tablet by mouth Daily. 90 tablet 3    Turmeric (QC TUMERIC COMPLEX PO) Take 1 tablet by mouth Daily. CHEWABLE  HOLD FOR SURGERY (Patient taking differently: Take 2 tablets by mouth Daily. CHEWABLE  HOLD FOR SURGERY)      zoledronic acid (Reclast) 5 MG/100ML solution infusion 100 mL 1 (One) Time. YEARLY  OSTEOPROSIS      [DISCONTINUED] acetaminophen (TYLENOL) 500 MG tablet Take 2 tablets by mouth Every 6 (Six) Hours As Needed for Mild Pain.      [DISCONTINUED] HYDROcodone-acetaminophen (NORCO) 5-325 MG per tablet Take 1 tablet by mouth Every 4 (Four) Hours As Needed for Severe Pain. 30 tablet 0    [DISCONTINUED] levETIRAcetam (KEPPRA) 500 MG tablet Take 1 tablet by mouth Daily for 10 days. 10 tablet 0     No current facility-administered medications on file prior to visit.         Assessment & Plan   Problems Addressed this Visit       Leukopenia - Primary    Relevant Orders    CBC & Differential    Elevated LFTs    Relevant Orders    Comprehensive metabolic panel    Ehrlichiosis    Hospital discharge follow-up     Diagnoses         Codes Comments      Leukopenia, unspecified type    -  Primary ICD-10-CM: D72.819  ICD-9-CM: 288.50       Elevated LFTs     ICD-10-CM: R79.89  ICD-9-CM: 790.6       Ehrlichiosis     ICD-10-CM: A77.40  ICD-9-CM: 082.40       Hospital discharge follow-up     ICD-10-CM: Z09  ICD-9-CM: V67.59             Assessment & Plan  1. Post-hospital follow-up.  - Admitted to [facility] on 05/19/2025 with fever and headache following a tick bite.  -Hospital record reviewed including labs, imaging studies, blood culture, respiratory panel.  -Patient tested positive for Ehrlichia and was treated with doxycycline for 10 days. Her symptoms, including headache and malaise, have resolved.  - Liver enzymes were elevated during hospitalization but have shown  improvement.  - Follow-up lab will be conducted today to assess CBC and CMP, including liver enzymes and electrolytes.            Patient or patient representative verbalized consent for the use of Ambient Listening during the visit with  Nikhil Maloney MD for chart documentation. 6/9/2025  14:20 EDT

## 2025-06-11 ENCOUNTER — TELEPHONE (OUTPATIENT)
Dept: ONCOLOGY | Facility: CLINIC | Age: 65
End: 2025-06-11
Payer: MEDICARE

## 2025-06-17 DIAGNOSIS — Z13.220 ENCOUNTER FOR LIPID SCREENING FOR CARDIOVASCULAR DISEASE: ICD-10-CM

## 2025-06-17 DIAGNOSIS — Z13.6 ENCOUNTER FOR LIPID SCREENING FOR CARDIOVASCULAR DISEASE: ICD-10-CM

## 2025-06-17 DIAGNOSIS — R74.8 ELEVATED ALKALINE PHOSPHATASE LEVEL: Primary | ICD-10-CM

## 2025-06-25 LAB — BLOOD OR BONE MARROW RESULT: NORMAL

## 2025-06-26 LAB
LAB AP CASE REPORT: NORMAL
LAB AP DIAGNOSIS COMMENT: NORMAL
LAB AP FLOW CYTOMETRY SUMMARY: NORMAL
PATH REPORT.ADDENDUM SPEC: NORMAL
PATH REPORT.FINAL DX SPEC: NORMAL
PATH REPORT.GROSS SPEC: NORMAL

## 2025-06-27 ENCOUNTER — TELEPHONE (OUTPATIENT)
Dept: FAMILY MEDICINE CLINIC | Facility: CLINIC | Age: 65
End: 2025-06-27

## 2025-06-27 NOTE — TELEPHONE ENCOUNTER
Hub staff attempted to follow warm transfer process and was unsuccessful     Caller: Angella Santos    Relationship to patient: Self    Best call back number:   Telephone Information:   Mobile [phone]         Patient is needing: PATIENT HAS ACTIVE LAB ORDERS AND NEEDS TO SCHEDULE

## 2025-07-09 ENCOUNTER — LAB (OUTPATIENT)
Dept: FAMILY MEDICINE CLINIC | Facility: CLINIC | Age: 65
End: 2025-07-09
Payer: MEDICARE

## 2025-07-09 DIAGNOSIS — R74.8 ELEVATED ALKALINE PHOSPHATASE LEVEL: ICD-10-CM

## 2025-07-09 DIAGNOSIS — Z13.6 ENCOUNTER FOR LIPID SCREENING FOR CARDIOVASCULAR DISEASE: ICD-10-CM

## 2025-07-09 DIAGNOSIS — Z13.220 ENCOUNTER FOR LIPID SCREENING FOR CARDIOVASCULAR DISEASE: ICD-10-CM

## 2025-07-09 LAB
ALP SERPL-CCNC: 84 U/L (ref 39–117)
CHOLEST SERPL-MCNC: 192 MG/DL (ref 0–200)
HDLC SERPL-MCNC: 53 MG/DL (ref 40–60)
LDLC SERPL CALC-MCNC: 126 MG/DL (ref 0–100)
LDLC/HDLC SERPL: 2.35 {RATIO}
TRIGL SERPL-MCNC: 71 MG/DL (ref 0–150)
VLDLC SERPL-MCNC: 13 MG/DL (ref 5–40)

## 2025-07-09 PROCEDURE — 36415 COLL VENOUS BLD VENIPUNCTURE: CPT

## 2025-07-09 PROCEDURE — 80061 LIPID PANEL: CPT | Performed by: FAMILY MEDICINE

## 2025-07-09 PROCEDURE — 84075 ASSAY ALKALINE PHOSPHATASE: CPT | Performed by: FAMILY MEDICINE

## 2025-07-14 LAB — CCV RESULT: NORMAL

## (undated) DEVICE — DRAPE,U/ SHT,SPLIT,PLAS,STERIL: Brand: MEDLINE

## (undated) DEVICE — APPL CHLORAPREP HI/LITE 26ML ORNG

## (undated) DEVICE — DISPOSABLE TOURNIQUET CUFF SINGLE BLADDER, SINGLE PORT AND QUICK CONNECT CONNECTOR: Brand: COLOR CUFF

## (undated) DEVICE — SPLNT PLSTR 10 FAST 4X15IN

## (undated) DEVICE — WEBRIL* CAST PADDING: Brand: DEROYAL

## (undated) DEVICE — BNDG,ELSTC,MATRIX,STRL,3"X5YD,LF,HOOK&LP: Brand: MEDLINE

## (undated) DEVICE — SOL ISO/ALC 70PCT 4OZ

## (undated) DEVICE — ELECTRD BLD EZ CLN MOD 2.5IN

## (undated) DEVICE — PK ORTHO MINOR TOWER 40

## (undated) DEVICE — VESSEL LOOPS X-RAY DETECTABLE: Brand: DEROYAL

## (undated) DEVICE — DRAPE,HAND,STERILE: Brand: MEDLINE

## (undated) DEVICE — 1.6 K-WIRE, TROCAR, 150MM, 1/PKG
Type: IMPLANTABLE DEVICE | Site: WRIST | Status: NON-FUNCTIONAL
Brand: APTUS
Removed: 2025-02-07

## (undated) DEVICE — SPNG GZ WOVN 4X4IN 12PLY 10/BX STRL

## (undated) DEVICE — DRESSING,GAUZE,XEROFORM,CURAD,1"X8",ST: Brand: CURAD

## (undated) DEVICE — ARM SLING: Brand: DEROYAL

## (undated) DEVICE — TWIST DRILL Ø2.0MM X 40MM, L91MM, AO: Brand: APTUS

## (undated) DEVICE — 2.5/2.8 SCREWDRIVER BLADE, HD7, AO: Brand: APTUS

## (undated) DEVICE — PENCL ES MEGADINE EZ/CLEAN BUTN W/HOLSTR 10FT

## (undated) DEVICE — RUBBERBAND LF STRL PK/2

## (undated) DEVICE — SUT MNCRYL 3/0 RB1 27IN UD Y215H

## (undated) DEVICE — UNDRGLV SURG BIOGEL PUNCTUREINDICATION SZ7 PF STRL

## (undated) DEVICE — PATIENT RETURN ELECTRODE, SINGLE-USE, CONTACT QUALITY MONITORING, ADULT, WITH 9FT CORD, FOR PATIENTS WEIGING OVER 33LBS. (15KG): Brand: MEGADYNE

## (undated) DEVICE — KT DRP MINIVIEW STRL

## (undated) DEVICE — SYR LL TP 10ML STRL

## (undated) DEVICE — TOWEL,OR,DSP,ST,BLUE,STD,4/PK,20PK/CS: Brand: MEDLINE

## (undated) DEVICE — DISPOSABLE BIPOLAR FORCEPS 4" (10.2CM) JEWELERS, STRAIGHT 0.4MM TIP AND 12 FT. (3.6M) CABLE: Brand: KIRWAN

## (undated) DEVICE — GLV SURG BIOGEL LTX PF 7

## (undated) DEVICE — SUT PROLN 4/0 PS2 18IN BLU